# Patient Record
Sex: FEMALE | Race: WHITE | NOT HISPANIC OR LATINO | Employment: FULL TIME | URBAN - NONMETROPOLITAN AREA
[De-identification: names, ages, dates, MRNs, and addresses within clinical notes are randomized per-mention and may not be internally consistent; named-entity substitution may affect disease eponyms.]

---

## 2018-03-01 ENCOUNTER — HOSPITAL ENCOUNTER (EMERGENCY)
Facility: HOSPITAL | Age: 23
Discharge: HOME | End: 2018-03-02
Attending: STUDENT IN AN ORGANIZED HEALTH CARE EDUCATION/TRAINING PROGRAM | Admitting: STUDENT IN AN ORGANIZED HEALTH CARE EDUCATION/TRAINING PROGRAM

## 2018-03-01 ENCOUNTER — APPOINTMENT (OUTPATIENT)
Dept: ULTRASOUND IMAGING | Facility: HOSPITAL | Age: 23
End: 2018-03-01

## 2018-03-01 DIAGNOSIS — R10.11 RIGHT UPPER QUADRANT ABDOMINAL PAIN: Primary | ICD-10-CM

## 2018-03-01 PROCEDURE — 96374 THER/PROPH/DIAG INJ IV PUSH: CPT

## 2018-03-01 PROCEDURE — 99283 EMERGENCY DEPT VISIT LOW MDM: CPT

## 2018-03-01 PROCEDURE — 80053 COMPREHEN METABOLIC PANEL: CPT | Performed by: STUDENT IN AN ORGANIZED HEALTH CARE EDUCATION/TRAINING PROGRAM

## 2018-03-01 PROCEDURE — 25010000002 ONDANSETRON PER 1 MG: Performed by: STUDENT IN AN ORGANIZED HEALTH CARE EDUCATION/TRAINING PROGRAM

## 2018-03-01 PROCEDURE — 96361 HYDRATE IV INFUSION ADD-ON: CPT

## 2018-03-01 PROCEDURE — 25010000002 MORPHINE PER 10 MG: Performed by: STUDENT IN AN ORGANIZED HEALTH CARE EDUCATION/TRAINING PROGRAM

## 2018-03-01 PROCEDURE — 96375 TX/PRO/DX INJ NEW DRUG ADDON: CPT

## 2018-03-01 PROCEDURE — 85025 COMPLETE CBC W/AUTO DIFF WBC: CPT | Performed by: STUDENT IN AN ORGANIZED HEALTH CARE EDUCATION/TRAINING PROGRAM

## 2018-03-01 PROCEDURE — 76705 ECHO EXAM OF ABDOMEN: CPT

## 2018-03-01 RX ORDER — MORPHINE SULFATE 4 MG/ML
4 INJECTION, SOLUTION INTRAMUSCULAR; INTRAVENOUS ONCE
Status: COMPLETED | OUTPATIENT
Start: 2018-03-01 | End: 2018-03-01

## 2018-03-01 RX ORDER — ONDANSETRON 2 MG/ML
4 INJECTION INTRAMUSCULAR; INTRAVENOUS ONCE
Status: COMPLETED | OUTPATIENT
Start: 2018-03-01 | End: 2018-03-01

## 2018-03-01 RX ADMIN — SODIUM CHLORIDE 1000 ML: 9 INJECTION, SOLUTION INTRAVENOUS at 23:55

## 2018-03-01 RX ADMIN — ONDANSETRON HYDROCHLORIDE 4 MG: 2 INJECTION, SOLUTION INTRAMUSCULAR; INTRAVENOUS at 23:54

## 2018-03-01 RX ADMIN — MORPHINE SULFATE 4 MG: 4 INJECTION, SOLUTION INTRAMUSCULAR; INTRAVENOUS at 23:55

## 2018-03-02 VITALS
HEIGHT: 66 IN | TEMPERATURE: 98.2 F | RESPIRATION RATE: 16 BRPM | BODY MASS INDEX: 28.93 KG/M2 | SYSTOLIC BLOOD PRESSURE: 147 MMHG | OXYGEN SATURATION: 100 % | HEART RATE: 85 BPM | DIASTOLIC BLOOD PRESSURE: 88 MMHG | WEIGHT: 180 LBS

## 2018-03-02 LAB
ALBUMIN SERPL-MCNC: 4.3 G/DL (ref 3.5–5)
ALBUMIN/GLOB SERPL: 1.4 G/DL (ref 1–2)
ALP SERPL-CCNC: 51 U/L (ref 38–126)
ALT SERPL W P-5'-P-CCNC: 28 U/L (ref 13–69)
ANION GAP SERPL CALCULATED.3IONS-SCNC: 17 MMOL/L
AST SERPL-CCNC: 18 U/L (ref 15–46)
B-HCG UR QL: NEGATIVE
BACTERIA UR QL AUTO: ABNORMAL /HPF
BASOPHILS # BLD AUTO: 0.02 10*3/MM3 (ref 0–0.2)
BASOPHILS NFR BLD AUTO: 0.3 % (ref 0–2.5)
BILIRUB SERPL-MCNC: 0.2 MG/DL (ref 0.2–1.3)
BILIRUB UR QL STRIP: NEGATIVE
BUN BLD-MCNC: 11 MG/DL (ref 7–20)
BUN/CREAT SERPL: 13.8 (ref 7.1–23.5)
CALCIUM SPEC-SCNC: 9.4 MG/DL (ref 8.4–10.2)
CHLORIDE SERPL-SCNC: 106 MMOL/L (ref 98–107)
CLARITY UR: CLEAR
CO2 SERPL-SCNC: 24 MMOL/L (ref 26–30)
COLOR UR: YELLOW
CREAT BLD-MCNC: 0.8 MG/DL (ref 0.6–1.3)
DEPRECATED RDW RBC AUTO: 44 FL (ref 37–54)
EOSINOPHIL # BLD AUTO: 0.14 10*3/MM3 (ref 0–0.7)
EOSINOPHIL NFR BLD AUTO: 2 % (ref 0–7)
ERYTHROCYTE [DISTWIDTH] IN BLOOD BY AUTOMATED COUNT: 13 % (ref 11.5–14.5)
GFR SERPL CREATININE-BSD FRML MDRD: 91 ML/MIN/1.73
GLOBULIN UR ELPH-MCNC: 3.1 GM/DL
GLUCOSE BLD-MCNC: 109 MG/DL (ref 74–98)
GLUCOSE UR STRIP-MCNC: NEGATIVE MG/DL
HCT VFR BLD AUTO: 37.6 % (ref 37–47)
HGB BLD-MCNC: 12.7 G/DL (ref 12–16)
HGB UR QL STRIP.AUTO: ABNORMAL
HYALINE CASTS UR QL AUTO: ABNORMAL /LPF
IMM GRANULOCYTES # BLD: 0.01 10*3/MM3 (ref 0–0.06)
IMM GRANULOCYTES NFR BLD: 0.1 % (ref 0–0.6)
KETONES UR QL STRIP: NEGATIVE
LEUKOCYTE ESTERASE UR QL STRIP.AUTO: NEGATIVE
LYMPHOCYTES # BLD AUTO: 3.29 10*3/MM3 (ref 0.6–3.4)
LYMPHOCYTES NFR BLD AUTO: 48.1 % (ref 10–50)
MCH RBC QN AUTO: 31.4 PG (ref 27–31)
MCHC RBC AUTO-ENTMCNC: 33.8 G/DL (ref 30–37)
MCV RBC AUTO: 92.8 FL (ref 81–99)
MONOCYTES # BLD AUTO: 0.57 10*3/MM3 (ref 0–0.9)
MONOCYTES NFR BLD AUTO: 8.3 % (ref 0–12)
MUCOUS THREADS URNS QL MICRO: ABNORMAL /HPF
NEUTROPHILS # BLD AUTO: 2.81 10*3/MM3 (ref 2–6.9)
NEUTROPHILS NFR BLD AUTO: 41.2 % (ref 37–80)
NITRITE UR QL STRIP: NEGATIVE
NRBC BLD MANUAL-RTO: 0 /100 WBC (ref 0–0)
PH UR STRIP.AUTO: 6 [PH] (ref 5–8)
PLATELET # BLD AUTO: 234 10*3/MM3 (ref 130–400)
PMV BLD AUTO: 10.1 FL (ref 6–12)
POTASSIUM BLD-SCNC: 4 MMOL/L (ref 3.5–5.1)
PROT SERPL-MCNC: 7.4 G/DL (ref 6.3–8.2)
PROT UR QL STRIP: NEGATIVE
RBC # BLD AUTO: 4.05 10*6/MM3 (ref 4.2–5.4)
RBC # UR: ABNORMAL /HPF
REF LAB TEST METHOD: ABNORMAL
SODIUM BLD-SCNC: 143 MMOL/L (ref 137–145)
SP GR UR STRIP: 1.02 (ref 1–1.03)
SQUAMOUS #/AREA URNS HPF: ABNORMAL /HPF
UROBILINOGEN UR QL STRIP: ABNORMAL
WBC NRBC COR # BLD: 6.84 10*3/MM3 (ref 4.8–10.8)
WBC UR QL AUTO: ABNORMAL /HPF

## 2018-03-02 PROCEDURE — 81025 URINE PREGNANCY TEST: CPT | Performed by: STUDENT IN AN ORGANIZED HEALTH CARE EDUCATION/TRAINING PROGRAM

## 2018-03-02 PROCEDURE — 81001 URINALYSIS AUTO W/SCOPE: CPT | Performed by: STUDENT IN AN ORGANIZED HEALTH CARE EDUCATION/TRAINING PROGRAM

## 2018-03-02 RX ORDER — ONDANSETRON 4 MG/1
4 TABLET, ORALLY DISINTEGRATING ORAL EVERY 6 HOURS PRN
Qty: 20 TABLET | Refills: 0 | OUTPATIENT
Start: 2018-03-02 | End: 2018-12-06

## 2018-03-02 RX ORDER — HYDROCODONE BITARTRATE AND ACETAMINOPHEN 5; 325 MG/1; MG/1
1 TABLET ORAL EVERY 6 HOURS PRN
Qty: 10 TABLET | Refills: 0 | OUTPATIENT
Start: 2018-03-02 | End: 2018-12-06

## 2018-03-02 NOTE — DISCHARGE INSTRUCTIONS
Biliary Colic, Adult  Biliary colic is severe pain caused by a problem with a small organ in the upper right part of your belly (gallbladder). The gallbladder stores a digestive fluid produced in the liver (bile) that helps the body break down fat. Bile and other digestive enzymes are carried from the liver to the small intestine though tube-like structures (bile ducts). The gallbladder and the bile ducts form the biliary tract.  Sometimes hard deposits of digestive fluids form in the gallbladder (gallstones) and block the flow of bile from the gallbladder, causing biliary colic. This condition is also called a gallbladder attack. Gallstones can be as small as a grain of sand or as big as a golf ball. There could be just one gallstone in the gallbladder, or there could be many.  What are the causes?  Biliary colic is usually caused by gallstones. Less often, a tumor could block the flow of bile from the gallbladder and trigger biliary colic.  What increases the risk?  This condition is more likely to develop in:  · Women.  · People of  descent.  · People with a family history of gallstones.  · People who are obese.  · People who suddenly or quickly lose weight.  · People who eat a high-calorie, low-fiber diet that is rich in refined carbs (carbohydrates), such as white bread and white rice.  · People who have an intestinal disease that affects nutrient absorption, such as Crohn disease.  · People who have a metabolic condition, such as metabolic syndrome or diabetes.  What are the signs or symptoms?  Severe pain in the upper right side of the belly is the main symptom of biliary colic. You may feel this pain below the chest but above the hip. This pain often occurs at night or after eating a very fatty meal. This pain may get worse for up to an hour and last as long as 12 hours. In most cases, the pain fades (subsides) within a couple hours.  Other symptoms of this condition include:  · Nausea and  vomiting.  · Pain under the right shoulder.  How is this diagnosed?  This condition is diagnosed based on your medical history, your symptoms, and a physical exam. You may have tests, including:  · Blood tests to rule out infection or inflammation of the bile ducts, gallbladder, pancreas, or liver.  · Imaging studies such as:  ¨ Ultrasound.  ¨ CT scan.  ¨ MRI.  In some cases, you may need to have an imaging study done using a small amount of radioactive material (nuclear medicine) to confirm the diagnosis.  How is this treated?  Treatment for this condition may include medicine to relieve your pain or nausea. If you have gallstones that are causing biliary colic, you may need surgery to remove the gallbladder (cholecystectomy). Gallstones can also be dissolved gradually with medicine. It may take months or years before the gallstones are completely gone.  Follow these instructions at home:  · Take over-the-counter and prescription medicines only as told by your health care provider.  · Drink enough fluid to keep your urine clear or pale yellow.  · Follow instructions from your health care provider about eating or drinking restrictions. These may include avoiding:  ¨ Fatty, greasy, and fried foods.  ¨ Any foods that make the pain worse.  ¨ Overeating.  ¨ Having a large meal after not eating for a while.  · Keep all follow-up visits as told by your health care provider. This is important.  How is this prevented?  Steps to prevent this condition include:  · Maintaining a healthy body weight.  · Getting regular exercise.  · Eating a healthy, high-fiber, low-fat diet.  · Limiting how much sugar and refined carbs you eat, such as sweets, white flour, and white rice.  Contact a health care provider if:  · Your pain lasts more than 5 hours.  · You vomit.  · You have a fever and chills.  · Your pain gets worse.  Get help right away if:  · Your skin or the whites of your eyes look yellow (jaundice).  · Your have tea-colored  urine and light-colored stools.  · You are dizzy or you faint.  This information is not intended to replace advice given to you by your health care provider. Make sure you discuss any questions you have with your health care provider.  Document Released: 05/21/2007 Document Revised: 08/15/2017 Document Reviewed: 07/03/2017  [employer] Interactive Patient Education © 2017 [employer]

## 2018-03-02 NOTE — ED PROVIDER NOTES
Subjective   HPI Comments: 23 yo f who presents with RUQ abd pain has been intermittent for the past 5 days.  States is much worse after she eats.  Tonight was worse she has had.  She is concerned that maybe her gallbladder.  Sr. had have her gallbladder out a couple of years ago.  Her primary care provider as scheduled her for an ultrasound.  Symptoms come on she becomes nauseous.  Pain is stabbing pain that goes from right upper quadrant to her chest and right shoulder.      Review of Systems   All other systems reviewed and are negative.      Past Medical History:   Diagnosis Date   • Hydradenitis        No Known Allergies    History reviewed. No pertinent surgical history.    History reviewed. No pertinent family history.    Social History     Social History   • Marital status: Single     Social History Main Topics   • Smoking status: Never Smoker   • Smokeless tobacco: Never Used   • Alcohol use Yes   • Drug use: No   • Sexual activity: Yes           Objective   Physical Exam   Nursing note and vitals reviewed.    GEN: Appears uncomfortable, but nontoxic  Head: Normocephalic, atraumatic  Eyes: Pupils equal round reactive to light  ENT: Posterior pharynx normal in appearance, oral mucosa is moist  Chest: Nontender to palpation  Cardiovascular: Regular rate  Lungs: Clear to auscultation bilaterally  Abdomen: Soft, tender in right upper quadrant, nondistended, no peritoneal signs  Extremities: No edema, normal appearance  Neuro: GCS 15  Psych: Mood and affect are appropriate    Procedures         ED Course  ED Course                  MDM  Number of Diagnoses or Management Options  Right upper quadrant abdominal pain:   Diagnosis management comments: Different diagnosis for this patient would include cholecystitis, cholelithiasis, pancreatitis, gastritis, GERD, gastroparesis, acute coronary syndrome, or other concerns.  Treated with IV narcotics and antiemetics good symptom control  Ultrasound is  unremarkable  Patient will need a HIDA scan.  After speaking with the patient's parents they report that a HIDA scan was required before her sister could have her gallbladder removed.       Amount and/or Complexity of Data Reviewed  Clinical lab tests: ordered and reviewed  Decide to obtain previous medical records or to obtain history from someone other than the patient: yes  Obtain history from someone other than the patient: yes  Review and summarize past medical records: yes  Independent visualization of images, tracings, or specimens: yes        Final diagnoses:   Right upper quadrant abdominal pain            Jorge Sanchez MD  03/02/18 4298

## 2019-05-16 ENCOUNTER — LAB (OUTPATIENT)
Dept: LAB | Facility: HOSPITAL | Age: 24
End: 2019-05-16

## 2019-05-16 ENCOUNTER — TRANSCRIBE ORDERS (OUTPATIENT)
Dept: LAB | Facility: HOSPITAL | Age: 24
End: 2019-05-16

## 2019-05-16 DIAGNOSIS — Z79.899 PRESCRIPTION MEDICATION STARTED: Primary | ICD-10-CM

## 2019-05-16 DIAGNOSIS — Z79.899 PRESCRIPTION MEDICATION STARTED: ICD-10-CM

## 2019-05-16 LAB
ALBUMIN SERPL-MCNC: 4.4 G/DL (ref 3.5–5)
ALBUMIN/GLOB SERPL: 1.3 G/DL (ref 1–2)
ALP SERPL-CCNC: 60 U/L (ref 38–126)
ALT SERPL W P-5'-P-CCNC: 28 U/L (ref 13–69)
ANION GAP SERPL CALCULATED.3IONS-SCNC: 15.6 MMOL/L (ref 10–20)
AST SERPL-CCNC: 26 U/L (ref 15–46)
BASOPHILS # BLD AUTO: 0.02 10*3/MM3 (ref 0–0.2)
BASOPHILS NFR BLD AUTO: 0.3 % (ref 0–1.5)
BILIRUB CONJ SERPL-MCNC: 0 MG/DL (ref 0–0.4)
BILIRUB SERPL-MCNC: 0.3 MG/DL (ref 0.2–1.3)
BUN BLD-MCNC: 10 MG/DL (ref 7–20)
BUN/CREAT SERPL: 16.7 (ref 7.1–23.5)
CALCIUM SPEC-SCNC: 9 MG/DL (ref 8.4–10.2)
CHLORIDE SERPL-SCNC: 101 MMOL/L (ref 98–107)
CHOLEST SERPL-MCNC: 170 MG/DL (ref 0–199)
CO2 SERPL-SCNC: 25 MMOL/L (ref 26–30)
CREAT BLD-MCNC: 0.6 MG/DL (ref 0.6–1.3)
DEPRECATED RDW RBC AUTO: 42.4 FL (ref 37–54)
EOSINOPHIL # BLD AUTO: 0.2 10*3/MM3 (ref 0–0.4)
EOSINOPHIL NFR BLD AUTO: 3.1 % (ref 0.3–6.2)
ERYTHROCYTE [DISTWIDTH] IN BLOOD BY AUTOMATED COUNT: 12.8 % (ref 12.3–15.4)
GFR SERPL CREATININE-BSD FRML MDRD: 125 ML/MIN/1.73
GLOBULIN UR ELPH-MCNC: 3.5 GM/DL
GLUCOSE BLD-MCNC: 89 MG/DL (ref 74–98)
HCT VFR BLD AUTO: 39 % (ref 34–46.6)
HDLC SERPL-MCNC: 48 MG/DL (ref 40–60)
HGB BLD-MCNC: 13.3 G/DL (ref 12–15.9)
IMM GRANULOCYTES # BLD AUTO: 0.02 10*3/MM3 (ref 0–0.05)
IMM GRANULOCYTES NFR BLD AUTO: 0.3 % (ref 0–0.5)
LDLC SERPL CALC-MCNC: 91 MG/DL (ref 0–99)
LDLC/HDLC SERPL: 1.89 {RATIO}
LYMPHOCYTES # BLD AUTO: 2.23 10*3/MM3 (ref 0.7–3.1)
LYMPHOCYTES NFR BLD AUTO: 35 % (ref 19.6–45.3)
MCH RBC QN AUTO: 31.2 PG (ref 26.6–33)
MCHC RBC AUTO-ENTMCNC: 34.1 G/DL (ref 31.5–35.7)
MCV RBC AUTO: 91.5 FL (ref 79–97)
MONOCYTES # BLD AUTO: 0.4 10*3/MM3 (ref 0.1–0.9)
MONOCYTES NFR BLD AUTO: 6.3 % (ref 5–12)
NEUTROPHILS # BLD AUTO: 3.5 10*3/MM3 (ref 1.7–7)
NEUTROPHILS NFR BLD AUTO: 55 % (ref 42.7–76)
NRBC BLD AUTO-RTO: 0 /100 WBC (ref 0–0.2)
PLATELET # BLD AUTO: 276 10*3/MM3 (ref 140–450)
PMV BLD AUTO: 10.4 FL (ref 6–12)
POTASSIUM BLD-SCNC: 3.6 MMOL/L (ref 3.5–5.1)
PROT SERPL-MCNC: 7.9 G/DL (ref 6.3–8.2)
RBC # BLD AUTO: 4.26 10*6/MM3 (ref 3.77–5.28)
SODIUM BLD-SCNC: 138 MMOL/L (ref 137–145)
TRIGL SERPL-MCNC: 157 MG/DL
VLDLC SERPL-MCNC: 31.4 MG/DL
WBC NRBC COR # BLD: 6.37 10*3/MM3 (ref 3.4–10.8)

## 2019-05-16 PROCEDURE — 87340 HEPATITIS B SURFACE AG IA: CPT

## 2019-05-16 PROCEDURE — 36415 COLL VENOUS BLD VENIPUNCTURE: CPT

## 2019-05-16 PROCEDURE — 86480 TB TEST CELL IMMUN MEASURE: CPT

## 2019-05-16 PROCEDURE — 80074 ACUTE HEPATITIS PANEL: CPT

## 2019-05-16 PROCEDURE — 86706 HEP B SURFACE ANTIBODY: CPT

## 2019-05-16 PROCEDURE — 80061 LIPID PANEL: CPT

## 2019-05-16 PROCEDURE — 86705 HEP B CORE ANTIBODY IGM: CPT

## 2019-05-16 PROCEDURE — 80053 COMPREHEN METABOLIC PANEL: CPT

## 2019-05-16 PROCEDURE — 82248 BILIRUBIN DIRECT: CPT

## 2019-05-16 PROCEDURE — 85025 COMPLETE CBC W/AUTO DIFF WBC: CPT

## 2019-05-16 PROCEDURE — 86803 HEPATITIS C AB TEST: CPT

## 2019-05-17 LAB
HBV CORE IGM SERPL QL IA: NEGATIVE
HBV SURFACE AB SER QL: REACTIVE
HBV SURFACE AG SERPL QL IA: NEGATIVE

## 2019-05-20 LAB
QUANTIFERON CRITERIA: NORMAL
QUANTIFERON MITOGEN VALUE: >10 IU/ML
QUANTIFERON NIL VALUE: 0.03 IU/ML
QUANTIFERON TB1 AG VALUE: 0.03 IU/ML
QUANTIFERON TB2 AG VALUE: 0.02 IU/ML
QUANTIFERON-TB GOLD PLUS: NEGATIVE

## 2019-05-22 LAB
HCV AB S/CO SERPL IA: <0.1 S/CO RATIO (ref 0–0.9)
HCV AB S/CO SERPL IA: <0.1 S/CO RATIO (ref 0–0.9)
SPECIMEN STATUS: NORMAL
SPECIMEN STATUS: NORMAL

## 2019-12-03 ENCOUNTER — TELEPHONE (OUTPATIENT)
Dept: URGENT CARE | Facility: CLINIC | Age: 24
End: 2019-12-03

## 2020-05-27 ENCOUNTER — LAB REQUISITION (OUTPATIENT)
Dept: LAB | Facility: HOSPITAL | Age: 25
End: 2020-05-27

## 2020-05-27 DIAGNOSIS — Z11.59 ENCOUNTER FOR SCREENING FOR OTHER VIRAL DISEASES: ICD-10-CM

## 2020-05-27 PROCEDURE — U0004 COV-19 TEST NON-CDC HGH THRU: HCPCS | Performed by: INTERNAL MEDICINE

## 2020-05-27 PROCEDURE — U0002 COVID-19 LAB TEST NON-CDC: HCPCS | Performed by: INTERNAL MEDICINE

## 2020-05-28 LAB
REF LAB TEST METHOD: NORMAL
SARS-COV-2 RNA RESP QL NAA+PROBE: NOT DETECTED

## 2021-08-13 ENCOUNTER — APPOINTMENT (OUTPATIENT)
Dept: GENERAL RADIOLOGY | Facility: HOSPITAL | Age: 26
End: 2021-08-13

## 2021-08-13 ENCOUNTER — HOSPITAL ENCOUNTER (EMERGENCY)
Facility: HOSPITAL | Age: 26
Discharge: HOME | End: 2021-08-13
Attending: EMERGENCY MEDICINE | Admitting: EMERGENCY MEDICINE

## 2021-08-13 VITALS
WEIGHT: 188.8 LBS | HEIGHT: 66 IN | RESPIRATION RATE: 16 BRPM | DIASTOLIC BLOOD PRESSURE: 78 MMHG | HEART RATE: 90 BPM | TEMPERATURE: 98.2 F | BODY MASS INDEX: 30.34 KG/M2 | SYSTOLIC BLOOD PRESSURE: 134 MMHG | OXYGEN SATURATION: 99 %
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
## 2021-08-13 DIAGNOSIS — S46.812A STRAIN OF LEFT TRAPEZIUS MUSCLE, INITIAL ENCOUNTER: Primary | ICD-10-CM

## 2021-08-13 LAB — B-HCG UR QL: NEGATIVE

## 2021-08-13 PROCEDURE — 81025 URINE PREGNANCY TEST: CPT | Performed by: EMERGENCY MEDICINE

## 2021-08-13 PROCEDURE — 73030 X-RAY EXAM OF SHOULDER: CPT

## 2021-08-13 PROCEDURE — 96372 THER/PROPH/DIAG INJ SC/IM: CPT

## 2021-08-13 PROCEDURE — 25010000002 KETOROLAC TROMETHAMINE PER 15 MG: Performed by: EMERGENCY MEDICINE

## 2021-08-13 PROCEDURE — 72040 X-RAY EXAM NECK SPINE 2-3 VW: CPT

## 2021-08-13 PROCEDURE — 99283 EMERGENCY DEPT VISIT LOW MDM: CPT

## 2021-08-13 PROCEDURE — 25010000002 ORPHENADRINE CITRATE PER 60 MG: Performed by: EMERGENCY MEDICINE

## 2021-08-13 RX ORDER — KETOROLAC TROMETHAMINE 30 MG/ML
60 INJECTION, SOLUTION INTRAMUSCULAR; INTRAVENOUS ONCE
Status: COMPLETED | OUTPATIENT
Start: 2021-08-13 | End: 2021-08-13

## 2021-08-13 RX ORDER — CYCLOBENZAPRINE HCL 10 MG
10 TABLET ORAL 3 TIMES DAILY PRN
Qty: 15 TABLET | Refills: 0 | Status: SHIPPED | OUTPATIENT
Start: 2021-08-13 | End: 2022-03-23

## 2021-08-13 RX ORDER — LIDOCAINE 50 MG/G
1 PATCH TOPICAL ONCE
Status: DISCONTINUED | OUTPATIENT
Start: 2021-08-13 | End: 2021-08-14 | Stop reason: HOSPADM

## 2021-08-13 RX ORDER — ORPHENADRINE CITRATE 30 MG/ML
60 INJECTION INTRAMUSCULAR; INTRAVENOUS EVERY 12 HOURS
Status: DISCONTINUED | OUTPATIENT
Start: 2021-08-13 | End: 2021-08-14 | Stop reason: HOSPADM

## 2021-08-13 RX ORDER — LIDOCAINE 50 MG/G
1 PATCH TOPICAL EVERY 24 HOURS
Qty: 6 EACH | Refills: 0 | Status: SHIPPED | OUTPATIENT
Start: 2021-08-13 | End: 2022-03-23

## 2021-08-13 RX ADMIN — KETOROLAC TROMETHAMINE 60 MG: 30 INJECTION, SOLUTION INTRAMUSCULAR at 21:01

## 2021-08-13 RX ADMIN — ORPHENADRINE CITRATE 60 MG: 30 INJECTION INTRAMUSCULAR; INTRAVENOUS at 21:01

## 2021-08-13 RX ADMIN — LIDOCAINE 1 PATCH: 50 PATCH TOPICAL at 21:01
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
## 2022-01-18 PROCEDURE — U0004 COV-19 TEST NON-CDC HGH THRU: HCPCS | Performed by: NURSE PRACTITIONER

## 2022-02-23 ENCOUNTER — OFFICE VISIT (OUTPATIENT)
Dept: INTERNAL MEDICINE | Facility: CLINIC | Age: 27
End: 2022-02-23

## 2022-02-23 VITALS
OXYGEN SATURATION: 98 % | HEART RATE: 95 BPM | BODY MASS INDEX: 30.61 KG/M2 | TEMPERATURE: 97.7 F | HEIGHT: 67 IN | DIASTOLIC BLOOD PRESSURE: 82 MMHG | WEIGHT: 195 LBS | SYSTOLIC BLOOD PRESSURE: 128 MMHG

## 2022-02-23 DIAGNOSIS — I10 PRIMARY HYPERTENSION: ICD-10-CM

## 2022-02-23 DIAGNOSIS — Z23 NEED FOR COVID-19 VACCINE: ICD-10-CM

## 2022-02-23 DIAGNOSIS — L73.2 HIDRADENITIS SUPPURATIVA: ICD-10-CM

## 2022-02-23 DIAGNOSIS — K21.9 HIATAL HERNIA WITH GERD: ICD-10-CM

## 2022-02-23 DIAGNOSIS — K44.9 HIATAL HERNIA WITH GERD: ICD-10-CM

## 2022-02-23 DIAGNOSIS — Z76.89 ENCOUNTER TO ESTABLISH CARE: Primary | ICD-10-CM

## 2022-02-23 PROCEDURE — 99214 OFFICE O/P EST MOD 30 MIN: CPT | Performed by: NURSE PRACTITIONER

## 2022-02-23 PROCEDURE — 0054A COVID-19 (PFIZER) 12+ YRS: CPT | Performed by: NURSE PRACTITIONER

## 2022-02-23 PROCEDURE — 91305 COVID-19 (PFIZER) 12+ YRS: CPT | Performed by: NURSE PRACTITIONER

## 2022-02-23 RX ORDER — DEXLANSOPRAZOLE 60 MG/1
60 CAPSULE, DELAYED RELEASE ORAL DAILY
Qty: 90 CAPSULE | Refills: 3 | Status: SHIPPED | OUTPATIENT
Start: 2022-02-23 | End: 2022-07-21

## 2022-02-23 NOTE — PROGRESS NOTES
Office Visit      Patient Name: Bertha Monte  : 1995   MRN: [id number]   Care Team: Patient Care Team:  Halina Quezada APRN as PCP - General (Family Medicine)    Chief Complaint  Establish Care (medication refills )    Subjective     Subjective      Bertha Monte presents to [facility] to establish care and for medication refill.   Transferring care from [place], has been living in [place] for a while but has been driving back and forth to PCP and is inconvenient.   She suffers from hidradenitis suppurativa and follows with Dr. Washington for management. She has been on humira since she was 19 years-old for the problem and it is well managed. She just had her labs checked at her dermatology visit in January and states to her knowledge they were normal.   She has also been diagnosed with hypertension. First diagnosed several years ago but has since lost weight, follows the DASH diet, and exercises throughout the week. Blood pressure has been well managed with lifestyle changes and spironolactone. She is interested in stopping her spironolactone as she plans to start trying to conceive soon and has heard she can't take it. She is good about monitoring her blood pressure at home and states It averages < 130/80. Denies shortness of breath, chest pain, lower extremity swelling, orthopnea, dizziness, and fatigue. She states she thinks her potassium was checked in January.   She has had an EGD and diagnosed with GERD and small hiatal hernia. She has tried several different PPI's but the only one that gives her relief is dexilant. Her symptoms are well controlled on dexilant and she has no side effects from the medication. She tries to follow dietary changes recommended to her for GERD. Denies ETOH, tobacco, blood in stool, abdominal pain, nausea, and vomiting. She does not need any OTC medications for breakthrough symptoms.   She would like her COVID booster dose today.  "    Review of Systems   Constitutional: Negative for appetite change, fatigue and fever.   HENT: Negative for congestion, sore throat and trouble swallowing.    Eyes: Negative for blurred vision and visual disturbance.   Respiratory: Negative for cough, shortness of breath and wheezing.    Cardiovascular: Negative for chest pain, palpitations and leg swelling.   Gastrointestinal: Negative for abdominal pain, blood in stool, constipation, diarrhea and nausea.   Endocrine: Negative for polydipsia, polyphagia and polyuria.   Genitourinary: Negative for dysuria.   Musculoskeletal: Negative for arthralgias, back pain and myalgias.   Skin: Negative for rash.   Neurological: Negative for dizziness, weakness, light-headedness and headache.   Psychiatric/Behavioral: Negative for sleep disturbance and depressed mood. The patient is not nervous/anxious.        Objective     Objective   Vital Signs:   /82   Pulse 95   Temp 97.7 °F (36.5 °C) (Temporal)   Ht 170.2 cm (67\")   Wt 88.5 kg (195 lb)   SpO2 98%   BMI 30.54 kg/m²     Physical Exam  Vitals and nursing note reviewed.   Constitutional:       General: She is not in acute distress.     Appearance: Normal appearance. She is not toxic-appearing.   Eyes:      Pupils: Pupils are equal, round, and reactive to light.   Neck:      Vascular: No carotid bruit.   Cardiovascular:      Rate and Rhythm: Normal rate and regular rhythm.      Heart sounds: Normal heart sounds. No murmur heard.      Pulmonary:      Effort: Pulmonary effort is normal. No respiratory distress.      Breath sounds: Normal breath sounds. No wheezing.   Abdominal:      General: Bowel sounds are normal. There is no distension.      Palpations: Abdomen is soft.      Tenderness: There is no abdominal tenderness.   Musculoskeletal:         General: Normal range of motion.      Cervical back: Neck supple. No tenderness.   Skin:     General: Skin is warm and dry.      Findings: No rash.   Neurological:      " General: No focal deficit present.      Mental Status: She is alert.   Psychiatric:         Mood and Affect: Mood normal.         Behavior: Behavior normal.          Assessment / Plan      Assessment/Plan   Problem List Items Addressed This Visit        Cardiac and Vasculature    Primary hypertension    Stable. Do recommend stopping spironolactone as this is not indicated in pregnancy. Recommend home blood pressure monitoring over the next month and bring readings to next visit. If staying elevated will need to consider other antihypertensive modality. Recommend DASH diet, moderate-intensity exercise 4-5 times/week, and stress management. Follow-up in 4 weeks, will perform physical and labs at that time.        Gastrointestinal Abdominal     Hiatal hernia with GERD    Relevant Medications    dexlansoprazole (DEXILANT) 60 MG capsule    Avoid eating spicy foods, caffeinated and carbonated beverages, alcohol, and chocolate. Try not to eat or drink within 3 hours of going to bed at night. May elevate the head of the bed. Eat smaller portions. Adhere to medication regimen as prescribed. Recommend trying every other day dosing of dexilant.        Skin    Hidradenitis suppurativa    Managed by dermatology, keep follow-up. Discussed implications during pregnancy, medication not recommended.       Other Visit Diagnoses     Encounter to establish care    -  Primary    Need for COVID-19 vaccine        Relevant Orders    COVID-19 Vaccine (Pfizer) Gray Cap (Completed)         Follow Up   Return in about 4 weeks (around 3/23/2022), or if symptoms worsen or fail to improve.  Patient was given instructions and counseling regarding her condition or for health maintenance advice. Please see specific information pulled into the AVS if appropriate.     VANCE Kaye  [facility]

## 2022-03-23 ENCOUNTER — OFFICE VISIT (OUTPATIENT)
Dept: INTERNAL MEDICINE | Facility: CLINIC | Age: 27
End: 2022-03-23

## 2022-03-23 VITALS
HEIGHT: 67 IN | TEMPERATURE: 98 F | OXYGEN SATURATION: 99 % | HEART RATE: 83 BPM | BODY MASS INDEX: 30.98 KG/M2 | SYSTOLIC BLOOD PRESSURE: 122 MMHG | DIASTOLIC BLOOD PRESSURE: 78 MMHG | WEIGHT: 197.4 LBS

## 2022-03-23 DIAGNOSIS — E66.09 CLASS 1 OBESITY DUE TO EXCESS CALORIES WITHOUT SERIOUS COMORBIDITY WITH BODY MASS INDEX (BMI) OF 30.0 TO 30.9 IN ADULT: ICD-10-CM

## 2022-03-23 DIAGNOSIS — I10 PRIMARY HYPERTENSION: ICD-10-CM

## 2022-03-23 DIAGNOSIS — K44.9 HIATAL HERNIA WITH GERD: ICD-10-CM

## 2022-03-23 DIAGNOSIS — L73.2 HIDRADENITIS SUPPURATIVA: ICD-10-CM

## 2022-03-23 DIAGNOSIS — Z31.9 PATIENT DESIRES PREGNANCY: ICD-10-CM

## 2022-03-23 DIAGNOSIS — Z00.00 ENCOUNTER FOR ANNUAL PHYSICAL EXAM: Primary | ICD-10-CM

## 2022-03-23 DIAGNOSIS — K21.9 HIATAL HERNIA WITH GERD: ICD-10-CM

## 2022-03-23 DIAGNOSIS — E61.1 IRON DEFICIENCY: ICD-10-CM

## 2022-03-23 LAB
ALBUMIN SERPL-MCNC: 4.3 G/DL (ref 3.5–5.2)
ALBUMIN/GLOB SERPL: 1.5 G/DL
ALP SERPL-CCNC: 57 U/L (ref 39–117)
ALT SERPL-CCNC: 18 U/L (ref 1–33)
AST SERPL-CCNC: 13 U/L (ref 1–32)
BILIRUB SERPL-MCNC: 0.4 MG/DL (ref 0–1.2)
BUN SERPL-MCNC: 7 MG/DL (ref 6–20)
BUN/CREAT SERPL: 9.3 (ref 7–25)
CALCIUM SERPL-MCNC: 9.5 MG/DL (ref 8.6–10.5)
CHLORIDE SERPL-SCNC: 100 MMOL/L (ref 98–107)
CHOLEST SERPL-MCNC: 209 MG/DL (ref 0–200)
CO2 SERPL-SCNC: 23.1 MMOL/L (ref 22–29)
CREAT SERPL-MCNC: 0.75 MG/DL (ref 0.57–1)
EGFRCR SERPLBLD CKD-EPI 2021: 113.5 ML/MIN/1.73
ERYTHROCYTE [DISTWIDTH] IN BLOOD BY AUTOMATED COUNT: 12.9 % (ref 12.3–15.4)
FERRITIN SERPL-MCNC: 119 NG/ML (ref 13–150)
FOLATE SERPL-MCNC: >20 NG/ML (ref 4.78–24.2)
GLOBULIN SER CALC-MCNC: 2.9 GM/DL
GLUCOSE SERPL-MCNC: 83 MG/DL (ref 65–99)
HCT VFR BLD AUTO: 38.5 % (ref 34–46.6)
HDLC SERPL-MCNC: 51 MG/DL (ref 40–60)
HGB BLD-MCNC: 13.1 G/DL (ref 12–15.9)
IRON SATN MFR SERPL: 21 % (ref 20–50)
IRON SERPL-MCNC: 93 MCG/DL (ref 37–145)
LDLC SERPL CALC-MCNC: 134 MG/DL (ref 0–100)
MCH RBC QN AUTO: 30.3 PG (ref 26.6–33)
MCHC RBC AUTO-ENTMCNC: 34 G/DL (ref 31.5–35.7)
MCV RBC AUTO: 88.9 FL (ref 79–97)
PLATELET # BLD AUTO: 305 10*3/MM3 (ref 140–450)
POTASSIUM SERPL-SCNC: 4 MMOL/L (ref 3.5–5.2)
PROT SERPL-MCNC: 7.2 G/DL (ref 6–8.5)
RBC # BLD AUTO: 4.33 10*6/MM3 (ref 3.77–5.28)
SODIUM SERPL-SCNC: 136 MMOL/L (ref 136–145)
TIBC SERPL-MCNC: 434 MCG/DL
TRIGL SERPL-MCNC: 137 MG/DL (ref 0–150)
UIBC SERPL-MCNC: 341 MCG/DL (ref 112–346)
VLDLC SERPL CALC-MCNC: 24 MG/DL (ref 5–40)
WBC # BLD AUTO: 8.03 10*3/MM3 (ref 3.4–10.8)

## 2022-03-23 PROCEDURE — 99395 PREV VISIT EST AGE 18-39: CPT | Performed by: NURSE PRACTITIONER

## 2022-03-23 NOTE — PROGRESS NOTES
Geraldo Monte is a 26 y.o. female and is here for a comprehensive physical exam. The patient reports no problems.    HPI: here today for annual physical exam without acute complaints today.   Stopped spironolactone after last visit due to trying to conceive. Her blood pressure at home is averaging 120/80. Denies headaches, chest pain, dizziness, shortness of breath, and lower extremity swelling.   Using dexilant every other day now and reflux symptoms feel well controlled.   She declines tdap and HPV today.     Health Habits:  Eye exam within last 2 years? Yes.   Dental exam every 6 months? Yes.   Exercise habits: [place] walks [place] 3 days a week normally.   Healthy diet? Tries to eat pretty healthy. Likes vegetables, drinks mostly water with some coffee, and tries to avoid fast food.     The ASCVD Risk score (Alireza LEVIN Jr., et al., 2013) failed to calculate for the following reasons:    The 2013 ASCVD risk score is only valid for ages 40 to 79    Do you take any herbs or supplements that were not prescribed by a doctor? yes- prenatal vitamin.   Are you taking calcium supplements? No  Are you taking aspirin daily? No     History:  LMP: Patient's last menstrual period was 02/23/2022.  Menopause: No  Last pap date: March 2021 - was normal.   Abnormal pap? yes- repeat normal.   Family history of breast or ovarian cancer: no       OB History   No obstetric history on file.      reports being sexually active and has had partner(s) who are male. She reports using the following method of birth control/protection: None.    The following portions of the patient's history were reviewed and updated as appropriate: She  has a past medical history of Allergic (15 years), GERD (gastroesophageal reflux disease), History of medical problems (10), Hydradenitis, Hypertension, and Seasonal allergies.  She does not have any pertinent problems on file.  She  has a past surgical history that includes  Adenoidectomy (2006); Tonsillectomy (2006); Knee surgery (Left); and Otoplasty (Bilateral, 2001).  Her family history includes Hypertension in her father; No Known Problems in her mother.  She  reports that she has never smoked. She has never used smokeless tobacco. She reports that she does not drink alcohol and does not use drugs.  Current Outpatient Medications   Medication Sig Dispense Refill   • adalimumab (HUMIRA) 40 MG/0.8ML Prefilled Syringe Kit injection Inject 40 mg under the skin 1 (One) Time.     • cetirizine (zyrTEC) 10 MG tablet Take 10 mg by mouth Daily.     • cyclobenzaprine (FLEXERIL) 10 MG tablet Take 1 tablet by mouth 3 (Three) Times a Day As Needed for Muscle Spasms. 15 tablet 0   • dexlansoprazole (DEXILANT) 60 MG capsule Take 1 capsule by mouth Daily. 90 capsule 3   • fluticasone (FLONASE) 50 MCG/ACT nasal spray 2 sprays into the nostril(s) as directed by provider Daily. 2 puffs each nostril 1 bottle 0   • lidocaine (LIDODERM) 5 % Place 1 patch on the skin as directed by provider Daily. Remove & Discard patch within 12 hours or as directed by MD 6 each 0   • polyethylene glycol (MIRALAX) powder 17 g po daily prn constipaton 500 g 2     No current facility-administered medications for this visit.       Review of SystemsReview of Systems   Constitutional: Negative for appetite change, fatigue and fever.   HENT: Negative for congestion, sore throat and trouble swallowing.    Eyes: Negative for blurred vision and visual disturbance.   Respiratory: Negative for cough, shortness of breath and wheezing.    Cardiovascular: Negative for chest pain, palpitations and leg swelling.   Gastrointestinal: Negative for abdominal pain, blood in stool, constipation, diarrhea and nausea.   Endocrine: Negative for polydipsia, polyphagia and polyuria.   Genitourinary: Negative for dysuria.   Musculoskeletal: Negative for arthralgias, back pain and myalgias.   Skin: Negative for rash.   Neurological: Negative for  "dizziness, weakness, light-headedness and headache.   Psychiatric/Behavioral: Negative for sleep disturbance and depressed mood. The patient is not nervous/anxious.          Objective   /78   Pulse 83   Temp 98 °F (36.7 °C) (Temporal)   Ht 170.2 cm (67\")   Wt 89.5 kg (197 lb 6.4 oz)   LMP 02/23/2022   SpO2 99%   BMI 30.92 kg/m²     Physical Exam  Vitals and nursing note reviewed.   Constitutional:       General: She is not in acute distress.     Appearance: Normal appearance. She is obese.   HENT:      Right Ear: Tympanic membrane and ear canal normal.      Left Ear: Tympanic membrane and ear canal normal.      Nose: Nose normal.      Mouth/Throat:      Mouth: Mucous membranes are moist.      Pharynx: Oropharynx is clear. No posterior oropharyngeal erythema.   Eyes:      Extraocular Movements: Extraocular movements intact.      Pupils: Pupils are equal, round, and reactive to light.   Neck:      Thyroid: No thyroid mass or thyromegaly.      Vascular: No carotid bruit.   Cardiovascular:      Rate and Rhythm: Normal rate and regular rhythm.      Pulses: Normal pulses.      Heart sounds: Normal heart sounds. No murmur heard.  Pulmonary:      Effort: Pulmonary effort is normal.      Breath sounds: Normal breath sounds. No wheezing.   Abdominal:      General: Bowel sounds are normal. There is no distension.      Palpations: Abdomen is soft. There is no mass.      Tenderness: There is no abdominal tenderness.   Genitourinary:     Comments: Deferred per pt, [facility]BY[facility] managing womens care    Musculoskeletal:         General: No deformity. Normal range of motion.      Cervical back: Normal range of motion and neck supple. No muscular tenderness.   Lymphadenopathy:      Head:      Right side of head: No submandibular, tonsillar, preauricular or posterior auricular adenopathy.      Left side of head: No submandibular, tonsillar, preauricular or posterior auricular adenopathy.      Cervical: No cervical adenopathy. "   Skin:     General: Skin is warm and dry.      Capillary Refill: Capillary refill takes less than 2 seconds.      Findings: No bruising or rash.   Neurological:      General: No focal deficit present.      Mental Status: She is alert and oriented to person, place, and time.      Gait: Gait normal.      Deep Tendon Reflexes: Reflexes normal.   Psychiatric:         Mood and Affect: Mood normal.         Behavior: Behavior normal.            Assessment/Plan   Healthy female exam.     1.    Diagnosis Plan   1. Encounter for annual physical exam  CBC No Differential    Comprehensive metabolic panel    Folate    Lipid panel    Iron and TIBC    Ferritin    Health maintenance discussed during visit and information provided in AVS. Update labs today, continue prenatal vitamin, keep follow-up with OBGYN, avoid drugs and alcohol while trying to conceive, healthy diet, and exercise encouraged.    2. Primary hypertension  CBC No Differential    Comprehensive metabolic panel    Folate    Lipid panel    Iron and TIBC    Ferritin    Stable off of medication. Recommend DASH diet, moderate-intensity exercise 4-5 times/week, and stress management.    3. Hiatal hernia with GERD  CBC No Differential    Comprehensive metabolic panel    Folate    Lipid panel    Iron and TIBC    Ferritin    Stable with every other day dosing of PPI. Avoid eating spicy foods, caffeinated and carbonated beverages, alcohol, and chocolate. Try not to eat or drink within 3 hours of going to bed at night. May elevate the head of the bed. Eat smaller portions. Adhere to medication regimen as prescribed. RTC if symptoms worsen despite interventions.        4. Hidradenitis suppurativa  CBC No Differential    Comprehensive metabolic panel    Folate    Lipid panel    Iron and TIBC    Ferritin    Managed by dermatology, keep follow-up.    5. Patient desires pregnancy  CBC No Differential    Comprehensive metabolic panel    Folate    Lipid panel    Iron and TIBC     Ferritin    Keep follow-up with OBGYN, continue prenatal vitamin with folic acid, update labs as above today. Avoid OTC medications and illicit substances to a fetus. Healthy diet, exercise, and timed intercourse recommended.    6. Class 1 obesity due to excess calories without serious comorbidity with body mass index (BMI) of 30.0 to 30.9 in adult  CBC No Differential    Comprehensive metabolic panel    Folate    Lipid panel    Iron and TIBC    Ferritin    Update lipid panel. Increase lean meats, vegetables, fruit, healthy fats, and water intake. Decrease processed foods, sugar, carbohydrates, soda, and fast food. Educated on portion control and recommendations of moderate intensity exercise 4-5 times/week. This problem will be re-evaluated at next regular visit.      7. Iron deficiency  Iron and TIBC    Ferritin    Update labs and replace if indicated.          2. Patient Counseling:  --Nutrition: Stressed importance of moderation in sodium/caffeine intake, saturated fat and cholesterol, caloric balance, sufficient intake of fresh fruits, vegetables, fiber, calcium, iron, and 1 g folate supplementation if of childbearing age.   --Discussed the issue of calcium supplement, and the daily use of baby aspirin if applicable.             --Mammogram recommended every 2 years from age 40-49 and yearly beginning at age 50.  --Exercise: Stressed the importance of regular exercise.   --Substance Abuse: Discussed cessation/primary prevention of tobacco (if applicable), alcohol, or other drug use (if applicable); driving or other dangerous activities under the influence; availability of treatment for abuse.   --Injury prevention: Discussed safety belts, safety helmets, smoke detector, smoking near bedding or upholstery.   --Dental health: Discussed importance of regular tooth brushing, flossing, and dental visits every 6 months.  --Immunizations reviewed.  --Discussed benefits of screening colonoscopy (if  applicable).  --After hours service discussed with patient    3. Discussed the patient's BMI with her.  The BMI is above average; BMI management plan is completed  4. Return in about 1 year (around 3/23/2023) for Annual.     Halina Quezada, VANCE  03/23/2022  13:12 EDT

## 2022-05-03 ENCOUNTER — TELEPHONE (OUTPATIENT)
Dept: INTERNAL MEDICINE | Facility: CLINIC | Age: 27
End: 2022-05-03

## 2022-05-03 NOTE — TELEPHONE ENCOUNTER
Caller: Bertha Monte    Relationship to patient: Self    Best call back number: [phone]    What is the call regarding:  PATIENT STATES THAT SHE NEEDS A PRIOR AUTHORIZATION FOR HER DEXILANT.  THE PHARMACY SENT OVER A REQUEST.

## 2022-05-03 NOTE — TELEPHONE ENCOUNTER
PA REQUIREMENT  Dexilant 60MG dr capsules Required  Famotidine 40 Mg Tab Not Required  Nexium Packet 10 Mg Grps Not Required  Nexium Packet 2.5 Mg Grps Not Required  Nexium Packet 20 Mg Grps Not Required  Nexium Packet 40 Mg Grps Not Required  Nexium Packet 5 Mg Grps Not Required  Omeprazole 20 Mg Cpdr Not Required  Pantoprazole 20 Mg TbEC Not Required  Dexlansoprazole 60 Mg CpDB Required  Nexium 20 Mg Cpdr Required  Prevacid SoluTab 30 Mg TbLD Required  Rabeprazole (Tbec) Required  Zegerid 20-1,680 Mg Pack Required  Zegerid 40-1,680 Mg Pack Required    Do you want to Pa or change?

## 2022-06-30 ENCOUNTER — TELEPHONE (OUTPATIENT)
Dept: INTERNAL MEDICINE | Facility: CLINIC | Age: 27
End: 2022-06-30

## 2022-06-30 NOTE — TELEPHONE ENCOUNTER
Caller: Bertha Monte    Relationship: Self    Best call back number: [phone] (H)    What test/procedure requested: VEXILANT MEDICATION 60 MG,     When is it needed: AS SOON AS POSSIBLE    Additional information or concerns: PATIENT HAS 6 DAYS LEFT OF THIS MEDICATION, PHARMACY STATES PATIENT NEEDS PRIOR AUTHORIZATION OF VEXILANT MEDICATION 60 MG REFILL.    PHARMACY: [id number] [place] [address] [address] AT [facility] - [phone]  - [phone]  [phone]    PLEASE CALL PATIENT ONCE MEDICATION IS SENT TO PHARMACY.

## 2022-07-01 NOTE — TELEPHONE ENCOUNTER
Per covermymeds PA is not needed. Called and spoke with pharmacy they are going to call patient to verify insurance as we did not have the correct one on file here. Will notify us if anything needs to be done on our end

## 2022-07-08 NOTE — TELEPHONE ENCOUNTER
Caller: SHIRLEY     Relationship: [facility]     Best call back number:[phone]    What was the call regarding:   SHIRLEY WITH [facility] STATED THAT PATIENT'S MEDICATION    VEXILANT MEDICATION 60 MG,  NEEDS A PRIOR AUTHORIZATION FOR INSURANCE TO COVER     GUAN: [id number]  FAX: [phone]    Do you require a callback: YES

## 2022-07-08 NOTE — TELEPHONE ENCOUNTER
This writer has submitted a prior authorization on patient's [employer] as well as patients' spouse's insurance.  Both stated this medication does not require a PA.  Called patient and advised her to contact her insurance company for further instructions.

## 2022-07-13 ENCOUNTER — TELEPHONE (OUTPATIENT)
Dept: INTERNAL MEDICINE | Facility: CLINIC | Age: 27
End: 2022-07-13

## 2022-07-13 ENCOUNTER — PRIOR AUTHORIZATION (OUTPATIENT)
Dept: INTERNAL MEDICINE | Facility: CLINIC | Age: 27
End: 2022-07-13

## 2022-07-13 NOTE — TELEPHONE ENCOUNTER
PATIENT WAS DISCONNECTED. CALLED BACK AND STATED [facility] IS SAYING THEY ARE NOT GETTING THE PRIOR AUTHORIZATION FOR THIS MEDICATION Dexilant .  CAN CALL [phone]    PATIENT WOULD LIKE A CALL BACK  [phone]

## 2022-07-19 ENCOUNTER — TELEPHONE (OUTPATIENT)
Dept: INTERNAL MEDICINE | Facility: CLINIC | Age: 27
End: 2022-07-19

## 2022-07-19 NOTE — TELEPHONE ENCOUNTER
Pharmacy Name:  [facility]    Pharmacy representative name: LAURA    Pharmacy representative phone number: [phone]    What medication are you calling in regards to: DEXLENSOPRAZOLE 60 MG DELAYED RELEASE    What question does the pharmacy have: THE MEDICATION NEEDS A PRIOR AUTHORIZATION AND IT CAN BE DONE OVER THE PHONE BY CALLING THE NUMBER ABOVE.    Who is the provider that prescribed the medication: BRENDA

## 2022-07-20 NOTE — TELEPHONE ENCOUNTER
[facility] states patient must try two of the following- Esomprazole , Omeprazole , Lansoprazole , Pantoprazole . Before covering Dexilant

## 2022-07-21 RX ORDER — LANSOPRAZOLE 30 MG/1
30 CAPSULE, DELAYED RELEASE ORAL DAILY
Qty: 30 CAPSULE | Refills: 3 | Status: SHIPPED | OUTPATIENT
Start: 2022-07-21 | End: 2022-11-29

## 2022-07-21 NOTE — TELEPHONE ENCOUNTER
Please call the patient and let her know. I will send in lansoprazole for her to try. Let me know if symptoms worsen. This is due to insurance problems.

## 2022-08-12 ENCOUNTER — TRANSCRIBE ORDERS (OUTPATIENT)
Dept: LAB | Facility: HOSPITAL | Age: 27
End: 2022-08-12

## 2022-08-12 ENCOUNTER — LAB (OUTPATIENT)
Dept: LAB | Facility: HOSPITAL | Age: 27
End: 2022-08-12

## 2022-08-12 DIAGNOSIS — E34.9 ENDOCRINE DISORDER RELATED TO PUBERTY: Primary | ICD-10-CM

## 2022-08-12 DIAGNOSIS — E34.9 ENDOCRINE DISORDER RELATED TO PUBERTY: ICD-10-CM

## 2022-08-12 PROCEDURE — 36415 COLL VENOUS BLD VENIPUNCTURE: CPT

## 2022-08-12 PROCEDURE — 83001 ASSAY OF GONADOTROPIN (FSH): CPT

## 2022-08-13 LAB — FSH SERPL-ACNC: 5.39 MIU/ML

## 2022-10-21 ENCOUNTER — OFFICE VISIT (OUTPATIENT)
Dept: INTERNAL MEDICINE | Facility: CLINIC | Age: 27
End: 2022-10-21

## 2022-10-21 VITALS
DIASTOLIC BLOOD PRESSURE: 82 MMHG | HEART RATE: 91 BPM | TEMPERATURE: 97.3 F | WEIGHT: 205 LBS | OXYGEN SATURATION: 97 % | HEIGHT: 67 IN | BODY MASS INDEX: 32.18 KG/M2 | SYSTOLIC BLOOD PRESSURE: 128 MMHG

## 2022-10-21 DIAGNOSIS — K21.9 HIATAL HERNIA WITH GERD: Primary | ICD-10-CM

## 2022-10-21 DIAGNOSIS — R09.89 GLOBUS SENSATION: ICD-10-CM

## 2022-10-21 DIAGNOSIS — K44.9 HIATAL HERNIA WITH GERD: Primary | ICD-10-CM

## 2022-10-21 DIAGNOSIS — E01.0 THYROMEGALY: ICD-10-CM

## 2022-10-21 DIAGNOSIS — F41.1 GENERALIZED ANXIETY DISORDER: ICD-10-CM

## 2022-10-21 PROCEDURE — 99214 OFFICE O/P EST MOD 30 MIN: CPT | Performed by: NURSE PRACTITIONER

## 2022-10-21 RX ORDER — SERTRALINE HYDROCHLORIDE 25 MG/1
25 TABLET, FILM COATED ORAL DAILY
Qty: 30 TABLET | Refills: 0 | Status: SHIPPED | OUTPATIENT
Start: 2022-10-21 | End: 2022-12-09 | Stop reason: SDUPTHER

## 2022-10-21 NOTE — PROGRESS NOTES
Office Visit      Patient Name: Bertha Monte  : 1995   MRN: [id number]   Care Team: Patient Care Team:  Halina Quezada APRN as PCP - General (Family Medicine)    Chief Complaint  Heartburn (Worsening GERD symptoms, wants to discuss referral to GI) and Anxiety (JUAN-7 score: 15)    Subjective     Subjective      Bertha Monte presents to [facility] for reflux and anxiety.   Reflux has been worsening since she had to change to lansoprazole from dexilant but ultimately feels like it has worsened in the last year.   Complains of dry cough, dry heave, epigastric pain, and globus sensation.    Denies nausea, blood in stool, fever, chills, and sore throat.   She can't pinpoint any food triggers, feels like all food causes the symptoms. She has never seen a GI specialist. Saw a general surgeron and had EGD in 2018, diagnosed with hiatal hernia. Has had a 5 pound weight gain since February.   Anxiety has also been worse in the last few months. She feels very panicky, hasn't had a panic attack but feels like it is about to come one.   She has dealt with anxiety for a while but never tried medication for the problem. Both mom and sister struggle with anxiety and are both on sertraline. No family history of bipolar disorder.     PHQ-2 Depression Screening  Little interest or pleasure in doing things? 0-->not at all   Feeling down, depressed, or hopeless? 0-->not at all   PHQ-2 Total Score 0     JUAN 7 = 15    Review of Systems   Constitutional: Negative for appetite change, fatigue and fever.   HENT: Negative for congestion, sore throat and trouble swallowing.    Eyes: Negative for blurred vision and visual disturbance.   Respiratory: Negative for cough, shortness of breath and wheezing.    Cardiovascular: Negative for chest pain, palpitations and leg swelling.   Gastrointestinal: Positive for abdominal pain, nausea, vomiting (sometimes) and GERD. Negative for blood in stool,  "constipation and diarrhea.   Endocrine: Negative for polydipsia, polyphagia and polyuria.   Genitourinary: Negative for dysuria.   Musculoskeletal: Negative for arthralgias, back pain and myalgias.   Skin: Negative for rash.   Neurological: Negative for dizziness, weakness, light-headedness and headache.   Psychiatric/Behavioral: Negative for sleep disturbance and depressed mood. The patient is nervous/anxious.        Objective     Objective   Vital Signs:   /82   Pulse 91   Temp 97.3 °F (36.3 °C)   Ht 170.2 cm (67\")   Wt 93 kg (205 lb)   SpO2 97%   BMI 32.11 kg/m²     Physical Exam  Vitals and nursing note reviewed.   Constitutional:       General: She is not in acute distress.     Appearance: Normal appearance. She is normal weight. She is not ill-appearing or toxic-appearing.   Eyes:      Pupils: Pupils are equal, round, and reactive to light.   Neck:      Thyroid: Thyromegaly and thyroid tenderness (right sided tenderness) present.   Cardiovascular:      Rate and Rhythm: Normal rate and regular rhythm.      Heart sounds: Normal heart sounds. No murmur heard.  Pulmonary:      Effort: Pulmonary effort is normal. No respiratory distress.      Breath sounds: Normal breath sounds. No wheezing.   Abdominal:      General: Abdomen is flat. Bowel sounds are normal. There is no distension.      Palpations: Abdomen is soft.      Tenderness: There is abdominal tenderness (epigastric). There is no right CVA tenderness or left CVA tenderness.      Hernia: No hernia is present.   Musculoskeletal:      Cervical back: Neck supple.   Skin:     General: Skin is warm and dry.      Findings: No rash.   Neurological:      General: No focal deficit present.      Mental Status: She is alert.   Psychiatric:         Mood and Affect: Mood is anxious.         Behavior: Behavior normal.          Assessment / Plan      Assessment & Plan   Problem List Items Addressed This Visit        Gastrointestinal Abdominal     Hiatal hernia " with GERD - Primary    Relevant Orders    Ambulatory Referral to Gastroenterology    Ambulatory Referral to Behavioral Health    TSH Rfx On Abnormal To Free T4 (Completed)    Vitamin B12 (Completed)    Referral to GI placed for further evaluation. Continue lansoprazole for now and discussed dietary restriction/recommendations.    Other Visit Diagnoses     Generalized anxiety disorder        Relevant Medications    sertraline (Zoloft) 25 MG tablet    Other Relevant Orders    Ambulatory Referral to Behavioral Health    TSH Rfx On Abnormal To Free T4 (Completed)    Vitamin B12 (Completed)    Symptoms uncontrolled. Labs as above today to rule out underlying causes. Begin sertraline. Advised on side effects of the medication including risk of suicidal/homicidal ideations in the first two weeks of use. Instructed to present to the ED or [facility] should she develop these symptoms. Recommended healthy diet, exercise as tolerated, daily sun exposure, sleep hygiene, stress management (stretching/yoga/meditation/journaling/reading), and medication compliance. Discussed counseling referral and she is agreeable. Close follow-up in 1 month.     Globus sensation        Relevant Orders    TSH Rfx On Abnormal To Free T4 (Completed)    Vitamin B12 (Completed)    US Thyroid    Thyromegaly        Relevant Orders    TSH Rfx On Abnormal To Free T4 (Completed)    Vitamin B12 (Completed)    US Thyroid    Thyroid US to further evaluate and TSH pending.          Follow Up   Return in about 4 weeks (around 11/18/2022) for Next scheduled follow up.  Patient was given instructions and counseling regarding her condition or for health maintenance advice. Please see specific information pulled into the AVS if appropriate.     VANCE Kaye  [facility] [place]    Answers for HPI/ROS submitted by the patient on 10/14/2022  Please describe your symptoms.: excessive GERD symptoms, regurgitation, dry heaving  cough. Anxiety problems  Have you had these symptoms before?: Yes  How long have you been having these symptoms?: Greater than 2 weeks  Please list any medications you are currently taking for this condition.: Lansoprazole  Please describe any probable cause for these symptoms. : Hiatal hernia  What is the primary reason for your visit?: Other

## 2022-10-22 LAB
TSH SERPL DL<=0.005 MIU/L-ACNC: 0.72 UIU/ML (ref 0.27–4.2)
VIT B12 SERPL-MCNC: 778 PG/ML (ref 211–946)

## 2022-10-27 ENCOUNTER — TELEPHONE (OUTPATIENT)
Dept: INTERNAL MEDICINE | Facility: CLINIC | Age: 27
End: 2022-10-27

## 2022-10-27 NOTE — TELEPHONE ENCOUNTER
Caller: Bertha Monte    Relationship: Self    Best call back number: [phone]     What is the medical concern/diagnosis:  HIATAL HERNIA AND GERD, ANXIETY    What specialty or service is being requested: GI AND BEHAVIORAL HEALTH    What is the provider, practice or medical service name:   What is the office location:   What is the office phone number:   Any additional details: PLEASE CALL WITH APPOINTMENT INFORMATION

## 2022-11-02 ENCOUNTER — APPOINTMENT (OUTPATIENT)
Dept: ULTRASOUND IMAGING | Facility: HOSPITAL | Age: 27
End: 2022-11-02

## 2022-11-16 ENCOUNTER — APPOINTMENT (OUTPATIENT)
Dept: ULTRASOUND IMAGING | Facility: HOSPITAL | Age: 27
End: 2022-11-16

## 2022-11-21 ENCOUNTER — HOSPITAL ENCOUNTER (OUTPATIENT)
Dept: ULTRASOUND IMAGING | Facility: HOSPITAL | Age: 27
Discharge: HOME | End: 2022-11-21
Admitting: NURSE PRACTITIONER

## 2022-11-21 PROCEDURE — 76536 US EXAM OF HEAD AND NECK: CPT

## 2022-11-29 RX ORDER — LANSOPRAZOLE 30 MG/1
CAPSULE, DELAYED RELEASE ORAL
Qty: 90 CAPSULE | Refills: 1 | Status: SHIPPED | OUTPATIENT
Start: 2022-11-29

## 2022-11-29 NOTE — TELEPHONE ENCOUNTER
Rx Refill Note  Requested Prescriptions     Pending Prescriptions Disp Refills   • lansoprazole (PREVACID) 30 MG capsule [Pharmacy Med Name: LANSOPRAZOLE 30MG DR CAPSULES] 90 capsule 1     Sig: TAKE 1 CAPSULE BY MOUTH EVERY DAY      Last office visit with prescribing clinician: 10/21/2022   Last telemedicine visit with prescribing clinician: 12/9/2022   Next office visit with prescribing clinician: 12/9/2022                       Jennie Gill MA  11/29/22, 16:41 EST

## 2022-12-09 ENCOUNTER — OFFICE VISIT (OUTPATIENT)
Dept: INTERNAL MEDICINE | Facility: CLINIC | Age: 27
End: 2022-12-09

## 2022-12-09 VITALS
DIASTOLIC BLOOD PRESSURE: 88 MMHG | WEIGHT: 201 LBS | SYSTOLIC BLOOD PRESSURE: 132 MMHG | HEART RATE: 68 BPM | HEIGHT: 67 IN | BODY MASS INDEX: 31.55 KG/M2 | TEMPERATURE: 97.1 F | OXYGEN SATURATION: 99 %

## 2022-12-09 DIAGNOSIS — F41.1 GENERALIZED ANXIETY DISORDER: Primary | ICD-10-CM

## 2022-12-09 PROCEDURE — 99214 OFFICE O/P EST MOD 30 MIN: CPT | Performed by: NURSE PRACTITIONER

## 2022-12-09 NOTE — PROGRESS NOTES
"  Office Visit      Patient Name: Bertha Monte  : 1995   MRN: [id number]   Care Team: Patient Care Team:  Halina Quezada APRN as PCP - General (Family Medicine)    Chief Complaint  Anxiety (Follow up on US of thyroid)    Subjective     Subjective      Bertha Monte presents to [facility] for anxiety follow-up.   Started on sertraline last visit for anxiety.   She is taking the medication as prescribed and denies any side effects. She has noticed a decrease in her appetite but not too bothersome.   Denies any suicidal ideations, panic attacks, and she is feeling motivated to do things she enjoys.   She is starting to feel anxiety is improving. Has an appointment with counseling set up for February.   No acute concerns today.   She did  Undergo thyroid US that was normal, small benign cysts considered TIRAD 1 in left lobe. She has an appointment with GI coming up.     Objective     Objective   Vital Signs:   /88   Pulse 68   Temp 97.1 °F (36.2 °C)   Ht 170.2 cm (67\")   Wt 91.2 kg (201 lb)   SpO2 99%   BMI 31.48 kg/m²     Physical Exam  Vitals and nursing note reviewed.   Constitutional:       General: She is not in acute distress.     Appearance: Normal appearance. She is obese. She is not toxic-appearing.   Eyes:      Pupils: Pupils are equal, round, and reactive to light.   Neck:      Vascular: No carotid bruit.   Cardiovascular:      Rate and Rhythm: Normal rate and regular rhythm.      Heart sounds: Normal heart sounds. No murmur heard.  Pulmonary:      Effort: Pulmonary effort is normal. No respiratory distress.      Breath sounds: Normal breath sounds. No wheezing.   Abdominal:      General: Bowel sounds are normal. There is no distension.      Palpations: Abdomen is soft.      Tenderness: There is no abdominal tenderness.   Musculoskeletal:      Cervical back: Neck supple. No tenderness.   Skin:     General: Skin is warm and dry.      Findings: " No rash.   Neurological:      General: No focal deficit present.      Mental Status: She is alert.   Psychiatric:         Mood and Affect: Mood normal.         Behavior: Behavior normal.        Assessment / Plan      Assessment & Plan   Problem List Items Addressed This Visit    None  Visit Diagnoses     Generalized anxiety disorder    -  Primary    Relevant Medications    sertraline (Zoloft) 50 MG tablet\    Improving with medication, increase to 50 mg nightly. Keep appointment with counseling, continue healthy diet, exercise, continue medication, and sleep hygiene encouraged. Will follow-up in 3 months. If any suicidal ideations, needs to go to the ER- verbalized understanding.          Follow Up   Return in about 3 months (around 3/9/2023) for Next scheduled follow up.  Patient was given instructions and counseling regarding her condition or for health maintenance advice. Please see specific information pulled into the AVS if appropriate.     VANCE Kaye  [facility] [place]

## 2023-01-12 ENCOUNTER — TELEPHONE (OUTPATIENT)
Dept: INTERNAL MEDICINE | Facility: CLINIC | Age: 28
End: 2023-01-12
Payer: COMMERCIAL

## 2023-01-12 NOTE — TELEPHONE ENCOUNTER
Caller: Bertha Monte    Relationship to patient: Self    Best call back number: [phone]    Chief complaint: SEVERE BACK PAIN AND NUMBNESS GOING DOWN LEG    Patient directed to call 911 or go to their nearest emergency room.     Patient verbalized understanding: [x] Yes  [] No  If no, why?    Additional notes: PATIENT AGREEABLE TO ER VISIT FOR SYMPTOMS ABOVE

## 2023-01-16 ENCOUNTER — OFFICE VISIT (OUTPATIENT)
Dept: INTERNAL MEDICINE | Facility: CLINIC | Age: 28
End: 2023-01-16
Payer: COMMERCIAL

## 2023-01-16 ENCOUNTER — PRIOR AUTHORIZATION (OUTPATIENT)
Dept: INTERNAL MEDICINE | Facility: CLINIC | Age: 28
End: 2023-01-16
Payer: COMMERCIAL

## 2023-01-16 VITALS
TEMPERATURE: 97 F | HEART RATE: 110 BPM | OXYGEN SATURATION: 97 % | WEIGHT: 196 LBS | BODY MASS INDEX: 30.76 KG/M2 | HEIGHT: 67 IN | DIASTOLIC BLOOD PRESSURE: 84 MMHG | SYSTOLIC BLOOD PRESSURE: 138 MMHG

## 2023-01-16 DIAGNOSIS — M54.31 SCIATICA, RIGHT SIDE: ICD-10-CM

## 2023-01-16 DIAGNOSIS — M54.40 CHRONIC BILATERAL LOW BACK PAIN WITH SCIATICA, SCIATICA LATERALITY UNSPECIFIED: Primary | ICD-10-CM

## 2023-01-16 DIAGNOSIS — G89.29 CHRONIC BILATERAL LOW BACK PAIN WITH SCIATICA, SCIATICA LATERALITY UNSPECIFIED: Primary | ICD-10-CM

## 2023-01-16 PROCEDURE — 99213 OFFICE O/P EST LOW 20 MIN: CPT | Performed by: FAMILY MEDICINE

## 2023-01-16 RX ORDER — CYCLOBENZAPRINE HCL 5 MG
5 TABLET ORAL 3 TIMES DAILY PRN
Qty: 30 TABLET | Refills: 0 | Status: SHIPPED | OUTPATIENT
Start: 2023-01-16 | End: 2023-01-21

## 2023-01-16 RX ORDER — LIDOCAINE 50 MG/G
1 PATCH TOPICAL EVERY 24 HOURS
Qty: 30 EACH | Refills: 0 | Status: SHIPPED | OUTPATIENT
Start: 2023-01-16 | End: 2023-01-22

## 2023-01-16 RX ORDER — MELOXICAM 7.5 MG/1
7.5 TABLET ORAL DAILY PRN
Qty: 30 TABLET | Refills: 1 | Status: SHIPPED | OUTPATIENT
Start: 2023-01-16 | End: 2023-01-30

## 2023-01-16 NOTE — PROGRESS NOTES
Bertha Monte is a 27 y.o. female.    Chief Complaint   Patient presents with   • Sciatica     Right side pain, still present.        HPI   Patient woke up with unbearable pain 5 days ago.  Pain is primarily located in low back and radiates into right leg.  She went to chiropractor, which she has done for several years.  He adjusted her right hip and she then developed burning, sharp shooting pain the following day. She went to the  4 days ago for the pain. She was given mobic, medrol, flexeril, lidocaine patches, and voltaren gel. Pain is 7/10.  Laying on side helps to some extent.  She does admit to physical labor with work.  She had recently been digging holes with work.       The following portions of the patient's history were reviewed and updated as appropriate: allergies, current medications, past family history, past medical history, past social history, past surgical history and problem list.     Allergies   Allergen Reactions   • Eggs Or Egg-Derived Products Other (See Comments)     Sneezing         Current Outpatient Medications:   •  adalimumab (HUMIRA) 40 MG/0.8ML Prefilled Syringe Kit injection, Inject 40 mg under the skin 1 (One) Time., Disp: , Rfl:   •  cetirizine (zyrTEC) 10 MG tablet, Take 10 mg by mouth Daily., Disp: , Rfl:   •  cyclobenzaprine (FLEXERIL) 5 MG tablet, Take 1 tablet by mouth 3 (Three) Times a Day As Needed for Muscle Spasms for up to 5 days., Disp: 30 tablet, Rfl: 0  •  Diclofenac Sodium (VOLTAREN) 1 % gel gel, Apply 4 g topically to the appropriate area as directed 4 (Four) Times a Day As Needed (musculoskeletal pain)., Disp: 100 g, Rfl: 0  •  fluticasone (FLONASE) 50 MCG/ACT nasal spray, 2 sprays into the nostril(s) as directed by provider Daily. 2 puffs each nostril, Disp: 1 bottle, Rfl: 0  •  lansoprazole (PREVACID) 30 MG capsule, TAKE 1 CAPSULE BY MOUTH EVERY DAY, Disp: 90 capsule, Rfl: 1  •  lidocaine (LIDODERM) 5 %, Place 1 patch on the skin as directed by provider  "Daily for 6 days. Remove & Discard patch within 12 hours or as directed by MD, Disp: 30 each, Rfl: 0  •  meloxicam (MOBIC) 7.5 MG tablet, Take 1 tablet by mouth Daily As Needed for Moderate Pain for up to 14 days., Disp: 30 tablet, Rfl: 1  •  methylPREDNISolone (MEDROL) 4 MG dose pack, Take as directed on package instructions., Disp: 21 each, Rfl: 0  •  polyethylene glycol (MIRALAX) powder, 17 g po daily prn constipaton, Disp: 500 g, Rfl: 2  •  sertraline (Zoloft) 50 MG tablet, Take 1 tablet by mouth Daily., Disp: 90 tablet, Rfl: 3    ROS    Review of Systems   Constitutional: Negative for chills and fever.   Respiratory: Negative for cough and shortness of breath.    Cardiovascular: Negative for chest pain.   Gastrointestinal: Positive for nausea. Negative for vomiting.   Musculoskeletal: Positive for arthralgias.   Neurological: Positive for numbness.       Vitals:    01/16/23 1149   BP: 138/84   BP Location: Left arm   Patient Position: Sitting   Cuff Size: Adult   Pulse: 110   Temp: 97 °F (36.1 °C)   SpO2: 97%   Weight: 88.9 kg (196 lb)   Height: 170.2 cm (67\")   PainSc:   7     Body mass index is 30.7 kg/m².    Physical Exam     Physical Exam  Constitutional:       General: She is not in acute distress.     Appearance: Normal appearance. She is well-developed.   HENT:      Head: Normocephalic and atraumatic.      Right Ear: External ear normal.      Left Ear: External ear normal.   Eyes:      Extraocular Movements: Extraocular movements intact.      Conjunctiva/sclera: Conjunctivae normal.   Cardiovascular:      Rate and Rhythm: Regular rhythm. Tachycardia present.      Heart sounds: No murmur heard.  Pulmonary:      Effort: Pulmonary effort is normal. No respiratory distress.   Abdominal:      General: There is no distension.   Musculoskeletal:         General: No tenderness.      Comments: Pain on elevation of left and right legs   Skin:     Coloration: Skin is not pale.   Neurological:      Mental Status: " She is alert and oriented to person, place, and time.      Cranial Nerves: No cranial nerve deficit.   Psychiatric:         Mood and Affect: Mood normal.         Behavior: Behavior normal.         Assessment/Plan    Diagnoses and all orders for this visit:    1. Chronic bilateral low back pain with sciatica, sciatica laterality unspecified (Primary)  -     Ambulatory Referral to Physical Therapy Evaluate and treat    2. Sciatica, right side  -     cyclobenzaprine (FLEXERIL) 5 MG tablet; Take 1 tablet by mouth 3 (Three) Times a Day As Needed for Muscle Spasms for up to 5 days.  Dispense: 30 tablet; Refill: 0  -     meloxicam (MOBIC) 7.5 MG tablet; Take 1 tablet by mouth Daily As Needed for Moderate Pain for up to 14 days.  Dispense: 30 tablet; Refill: 1  -     lidocaine (LIDODERM) 5 %; Place 1 patch on the skin as directed by provider Daily for 6 days. Remove & Discard patch within 12 hours or as directed by MD  Dispense: 30 each; Refill: 0      Encouraged to complete course of medrol, which she just started yesterday.  May take mobic while taking medrol.  May continue flexeril and lidocaine patches as well.  Felt to be muscular.  Reviewed x-rays from , no radiologist read available at this time.  Will refer to PT for further evaluation and treatment.  If no improvement, will plan for an MRI.      New Medications Ordered This Visit   Medications   • cyclobenzaprine (FLEXERIL) 5 MG tablet     Sig: Take 1 tablet by mouth 3 (Three) Times a Day As Needed for Muscle Spasms for up to 5 days.     Dispense:  30 tablet     Refill:  0   • meloxicam (MOBIC) 7.5 MG tablet     Sig: Take 1 tablet by mouth Daily As Needed for Moderate Pain for up to 14 days.     Dispense:  30 tablet     Refill:  1   • lidocaine (LIDODERM) 5 %     Sig: Place 1 patch on the skin as directed by provider Daily for 6 days. Remove & Discard patch within 12 hours or as directed by MD     Dispense:  30 each     Refill:  0       No orders of the defined  types were placed in this encounter.      Return if symptoms worsen or fail to improve.    Elissa Mccabeer, DO

## 2023-01-24 ENCOUNTER — OFFICE VISIT (OUTPATIENT)
Dept: GASTROENTEROLOGY | Facility: CLINIC | Age: 28
End: 2023-01-24
Payer: COMMERCIAL

## 2023-01-24 VITALS
HEART RATE: 95 BPM | OXYGEN SATURATION: 98 % | WEIGHT: 200 LBS | BODY MASS INDEX: 32.14 KG/M2 | HEIGHT: 66 IN | DIASTOLIC BLOOD PRESSURE: 72 MMHG | SYSTOLIC BLOOD PRESSURE: 128 MMHG

## 2023-01-24 DIAGNOSIS — R05.3 CHRONIC COUGH: ICD-10-CM

## 2023-01-24 DIAGNOSIS — K21.9 GASTROESOPHAGEAL REFLUX DISEASE, UNSPECIFIED WHETHER ESOPHAGITIS PRESENT: Primary | ICD-10-CM

## 2023-01-24 DIAGNOSIS — R11.2 NAUSEA AND VOMITING, UNSPECIFIED VOMITING TYPE: ICD-10-CM

## 2023-01-24 DIAGNOSIS — E66.9 CLASS 1 OBESITY WITH BODY MASS INDEX (BMI) OF 32.0 TO 32.9 IN ADULT, UNSPECIFIED OBESITY TYPE, UNSPECIFIED WHETHER SERIOUS COMORBIDITY PRESENT: ICD-10-CM

## 2023-01-24 DIAGNOSIS — Z87.19 HISTORY OF HIATAL HERNIA: ICD-10-CM

## 2023-01-24 PROCEDURE — 99204 OFFICE O/P NEW MOD 45 MIN: CPT | Performed by: PHYSICIAN ASSISTANT

## 2023-01-24 RX ORDER — SODIUM CHLORIDE 9 MG/ML
30 INJECTION, SOLUTION INTRAVENOUS CONTINUOUS PRN
Status: CANCELLED | OUTPATIENT
Start: 2023-01-24

## 2023-01-24 RX ORDER — DEXLANSOPRAZOLE 60 MG/1
60 CAPSULE, DELAYED RELEASE ORAL DAILY
Qty: 30 CAPSULE | Refills: 5 | Status: SHIPPED | OUTPATIENT
Start: 2023-01-24 | End: 2023-03-10 | Stop reason: ALTCHOICE

## 2023-01-24 RX ORDER — FAMOTIDINE 40 MG/1
40 TABLET, FILM COATED ORAL NIGHTLY
Qty: 90 TABLET | Refills: 1 | Status: SHIPPED | OUTPATIENT
Start: 2023-01-24

## 2023-01-24 RX ORDER — CLINDAMYCIN PHOSPHATE 10 MG/G
GEL TOPICAL SEE ADMIN INSTRUCTIONS
COMMUNITY
Start: 2023-01-16

## 2023-01-24 NOTE — PROGRESS NOTES
New Patient Consult      Date: 2023   Patient Name: Bertha Monte  MRN: [id number]  : 1995     Primary Care Provider: Halina Quezada APRN  Referring Provider: Pilar    Chief Complaint   Patient presents with   • Heartburn   • Hiatal Hernia     History of Present Illness: Bertha Monte is a 27 y.o. female who is here today as a consultation with Gastroenterology for evaluation of Heartburn and Hiatal Hernia.    She has been having acid reflux symptoms for the past 5 to 6 years.  Symptoms have been more severe recently and she has developed nausea with vomiting during a coughing episode.  The vomitus is sometimes only acid but also sometimes contains food.  Has been taking Prevacid 30 mg once daily but still severe symptoms.  Previously was taking Dexilant 60 mg once daily with better relief.  She takes Tums in addition to the Prevacid as needed and reflux severe at bedtime.  She has noticed worse reflux when lying supine.  She elevates the head of bed with pillows.  No dysphagia.  She rarely drinks alcohol.  She drinks coffee, water and occasionally soda.  She is a non-smoker.  She does not eat any food close to bedtime.  She has a cough after eating which has been chronic and aggravating.    Had EGD in 2018 by Dr. Feliciano, told she had hiatal hernia. No prior colonoscopy. No current abdominal pain. No change in bowel habits. No rectal bleeding. No recent abdominal imaging. Had labs with PCP in Oct 2022 which were normal. She has HS and takes hiumira inj bi-weekly, also taking Mobic. No other NSAIDs.     Subjective      Past Medical History:   Diagnosis Date   • Allergic 15 years    Seasonal allergies, egg food allergy not severe   • Anxiety 6 months ago   • GERD (gastroesophageal reflux disease)    • Hernia 2018   • Hydradenitis supp    • Hypertension    • Seasonal allergies      Past Surgical History:   Procedure Laterality Date   • ADENOIDECTOMY  2006   • ENDOSCOPY  2018      Braden- hiatal hernia small   • KNEE SURGERY Left    • OTOPLASTY Bilateral 2001   • TONSILLECTOMY  2006     Family History   Problem Relation Age of Onset   • No Known Problems Mother    • Hypertension Father      Social History     Socioeconomic History   • Marital status:    Tobacco Use   • Smoking status: Never   • Smokeless tobacco: Never   Vaping Use   • Vaping Use: Never used   Substance and Sexual Activity   • Alcohol use: Yes     Comment: social   • Drug use: No   • Sexual activity: Yes     Partners: Male     Birth control/protection: None     Current Outpatient Medications:   •  adalimumab (HUMIRA) 40 MG/0.8ML Prefilled Syringe Kit injection, Inject 40 mg under the skin 1 (One) Time., Disp: , Rfl:   •  clindamycin (CLINDAGEL) 1 % gel, Apply  topically to the appropriate area as directed See Admin Instructions. Apply topically to the affected area twice daily, Disp: , Rfl:   •  Diclofenac Sodium (VOLTAREN) 1 % gel gel, Apply 4 g topically to the appropriate area as directed 4 (Four) Times a Day As Needed (musculoskeletal pain)., Disp: 100 g, Rfl: 0  •  lansoprazole (PREVACID) 30 MG capsule, TAKE 1 CAPSULE BY MOUTH EVERY DAY, Disp: 90 capsule, Rfl: 1  •  meloxicam (MOBIC) 7.5 MG tablet, Take 1 tablet by mouth Daily As Needed for Moderate Pain for up to 14 days., Disp: 30 tablet, Rfl: 1  •  sertraline (Zoloft) 50 MG tablet, Take 1 tablet by mouth Daily., Disp: 90 tablet, Rfl: 3    Allergies   Allergen Reactions   • Eggs Or Egg-Derived Products Other (See Comments)     Sneezing     The following portions of the patient's history were reviewed and updated as appropriate: allergies, current medications, past family history, past medical history, past social history, past surgical history and problem list.    Objective     Physical Exam  Vitals reviewed.   Constitutional:       General: She is not in acute distress.     Appearance: Normal appearance. She is well-developed. She is obese. She is not  "ill-appearing or diaphoretic.   HENT:      Head: Normocephalic and atraumatic.      Right Ear: External ear normal.      Left Ear: External ear normal.      Nose: Nose normal.      Mouth/Throat:      Comments: Wearing a mask  Eyes:      General: No scleral icterus.        Right eye: No discharge.         Left eye: No discharge.      Conjunctiva/sclera: Conjunctivae normal.   Neck:      Vascular: No JVD.   Cardiovascular:      Rate and Rhythm: Normal rate and regular rhythm.      Heart sounds: Normal heart sounds. No murmur heard.    No friction rub. No gallop.   Pulmonary:      Effort: Pulmonary effort is normal. No respiratory distress.      Breath sounds: Normal breath sounds. No wheezing or rales.   Chest:      Chest wall: No tenderness.   Abdominal:      General: Bowel sounds are normal. There is no distension.      Palpations: Abdomen is soft. There is no mass.      Tenderness: There is abdominal tenderness (mild RUQ). There is no guarding.   Musculoskeletal:         General: No deformity. Normal range of motion.      Cervical back: Normal range of motion.   Skin:     General: Skin is warm and dry.      Findings: No erythema or rash.   Neurological:      Mental Status: She is alert and oriented to person, place, and time.      Coordination: Coordination normal.   Psychiatric:         Mood and Affect: Mood normal.         Behavior: Behavior normal.         Thought Content: Thought content normal.         Judgment: Judgment normal.       Vitals:    01/24/23 1000   BP: 128/72   Pulse: 95   SpO2: 98%   Weight: 90.7 kg (200 lb)   Height: 167.6 cm (66\")     Results Review:   I have reviewed the patient's new clinical and imaging results.    Office Visit on 10/21/2022   Component Date Value Ref Range Status   • TSH 10/21/2022 0.724  0.270 - 4.200 uIU/mL Final   • Vitamin B-12 10/21/2022 778  211 - 946 pg/mL Final    Results may be falsely increased if patient taking Biotin.      US Thyroid  Result Date: 11/21/2022   " 1.  No suspicious thyroid nodule identified.      US abd limited 2018: liver normal. gb normal.      Assessment / Plan      1. Gastroesophageal reflux disease, unspecified whether esophagitis present  2. Nausea and vomiting, unspecified vomiting type  3. Chronic cough  4. History of hiatal hernia  She has had reflux symptoms for several years now (at least 5-6). She has noticed a worsening of symptoms recently over the past few months. She has severe reflux at night with burning in her esophagus, has chronic cough after meals and will sometimes have severe nausea and vomiting after a coughing episode. No dysphagia. She is a non smoker and nonalcoholic. She does take Mobic and has history of HS. She was told with EGD in 2018 that she has a hiatal hernia. Currently taking prevacid 60 mg once daily without complete relief of symptoms.    EGD will be arranged  Elevate HOB  Avoid NSAIDs  Cut out caffeine  Anti-reflux diet  Continue Prevacid, will try for approval of Dexilant 60 mg once daily  Add pepcid 40 mg nightly    - famotidine (Pepcid) 40 MG tablet; Take 1 tablet by mouth Every Night.  Dispense: 90 tablet; Refill: 1  - dexlansoprazole (Dexilant) 60 MG capsule; Take 1 capsule by mouth Daily.  Dispense: 30 capsule; Refill: 5    She will need an esophagogastroduodenoscopy performed with monitored anesthesia care. The indications, technique, alternatives and potential risk and complications were discussed with the patient including but not limited to bleeding, intestinal perforations, missing lesions and anesthetic complications. The patient understands and wishes to proceed with the procedure and has given their verbal consent. Written patient education information was given to the patient. She should follow up in the office after this procedure to discuss the results and further recommendations can be made at that time. The patient will call if they have further questions before procedure.  - Case Request    5. Class  1 obesity with body mass index (BMI) of 32.0 to 32.9 in adult, unspecified obesity type, unspecified whether serious comorbidity present  Her BMI is 32. She does not have any history of liver disease. Labs 2022 show normal liver enzymes. Abdominal imaging in 2018 showed normal liver. She is at increased risk for development of fatty liver and should work on diet modifications for weight loss.             Follow Up:   Return for follow up after procedure to discuss results.      Victorina Rivera PA-C  [facility]  1/24/2023  10:49 EST    Dictated Utilizing Dragon Dictation: Part of this note may be an electronic transcription/translation of spoken language to printed text using the Dragon Dictation System.

## 2023-02-01 ENCOUNTER — PRIOR AUTHORIZATION (OUTPATIENT)
Dept: GASTROENTEROLOGY | Facility: CLINIC | Age: 28
End: 2023-02-01
Payer: COMMERCIAL

## 2023-02-23 ENCOUNTER — OFFICE VISIT (OUTPATIENT)
Dept: BEHAVIORAL HEALTH | Facility: CLINIC | Age: 28
End: 2023-02-23
Payer: COMMERCIAL

## 2023-02-23 VITALS
HEIGHT: 66 IN | HEART RATE: 94 BPM | DIASTOLIC BLOOD PRESSURE: 84 MMHG | WEIGHT: 196 LBS | SYSTOLIC BLOOD PRESSURE: 138 MMHG | BODY MASS INDEX: 31.5 KG/M2

## 2023-02-23 DIAGNOSIS — F41.1 GENERALIZED ANXIETY DISORDER: Primary | ICD-10-CM

## 2023-02-23 PROCEDURE — 90791 PSYCH DIAGNOSTIC EVALUATION: CPT | Performed by: COUNSELOR

## 2023-03-10 ENCOUNTER — OFFICE VISIT (OUTPATIENT)
Dept: INTERNAL MEDICINE | Facility: CLINIC | Age: 28
End: 2023-03-10
Payer: COMMERCIAL

## 2023-03-10 VITALS
HEIGHT: 66 IN | BODY MASS INDEX: 32.27 KG/M2 | OXYGEN SATURATION: 99 % | SYSTOLIC BLOOD PRESSURE: 128 MMHG | HEART RATE: 90 BPM | RESPIRATION RATE: 17 BRPM | WEIGHT: 200.8 LBS | DIASTOLIC BLOOD PRESSURE: 86 MMHG | TEMPERATURE: 99 F

## 2023-03-10 DIAGNOSIS — M54.16 LUMBAR RADICULOPATHY: ICD-10-CM

## 2023-03-10 DIAGNOSIS — G89.29 CHRONIC BILATERAL LOW BACK PAIN WITH SCIATICA, SCIATICA LATERALITY UNSPECIFIED: Primary | ICD-10-CM

## 2023-03-10 DIAGNOSIS — M54.31 SCIATICA, RIGHT SIDE: ICD-10-CM

## 2023-03-10 DIAGNOSIS — M54.40 CHRONIC BILATERAL LOW BACK PAIN WITH SCIATICA, SCIATICA LATERALITY UNSPECIFIED: Primary | ICD-10-CM

## 2023-03-10 PROCEDURE — 99214 OFFICE O/P EST MOD 30 MIN: CPT | Performed by: NURSE PRACTITIONER

## 2023-03-10 RX ORDER — LORATADINE 10 MG/1
CAPSULE, LIQUID FILLED ORAL
COMMUNITY

## 2023-03-10 RX ORDER — ADALIMUMAB 40MG/0.4ML
40 KIT SUBCUTANEOUS WEEKLY
COMMUNITY

## 2023-03-10 RX ORDER — DEXLANSOPRAZOLE 30 MG/1
CAPSULE, DELAYED RELEASE ORAL
COMMUNITY
End: 2023-03-10 | Stop reason: ALTCHOICE

## 2023-03-10 NOTE — PROGRESS NOTES
"  Office Visit      Patient Name: Bertha Monte  : 1995   MRN: [id number]   Care Team: Patient Care Team:  Halina Quezada APRN as PCP - General (Family Medicine)  Sofya Tay LPCC as Counselor (Behavioral Health)    Chief Complaint  Follow-up (Sciatica)    Subjective     Subjective      Bertha Monte presents to [facility] for follow-up of back pain.   Saw at [facility]  2 months ago then saw provider here. She was given steroid taper, cyclobenzaprine, and meloxicam. She has stopped all of these medications. If pain is severe she will take an 800 mg motrin over the counter but trying to avoid medications currently. Was referred to physical therapy and has been participating. She does feel like PT has helped. Rates pain a 5/10 today. Pain is located in her mid thigh up to her glute on the right side. She also still endorses numbness, there all the time not just certain positions.   Denies bowel/bladder incontinence, recent trauma or injury to the lumbar spine, and worsening symptoms.   She does feels like symptoms have improved, feels it is minimal though.     Objective     Objective   Vital Signs:   /86 (BP Location: Left arm, Patient Position: Sitting, Cuff Size: Adult)   Pulse 90   Temp 99 °F (37.2 °C) (Temporal)   Resp 17   Ht 167.6 cm (65.98\")   Wt 91.1 kg (200 lb 12.8 oz)   SpO2 99%   BMI 32.43 kg/m²     Physical Exam  Vitals and nursing note reviewed.   Constitutional:       General: She is not in acute distress.     Appearance: Normal appearance.   Cardiovascular:      Rate and Rhythm: Normal rate and regular rhythm.      Heart sounds: Normal heart sounds. No murmur heard.  Pulmonary:      Effort: Pulmonary effort is normal.      Breath sounds: Normal breath sounds. No wheezing.   Abdominal:      General: Bowel sounds are normal.      Palpations: Abdomen is soft.      Tenderness: There is no right CVA tenderness or left CVA tenderness. "   Musculoskeletal:      Cervical back: Normal and normal range of motion. No muscular tenderness.      Thoracic back: Normal.      Lumbar back: Tenderness present. Decreased range of motion. Positive left straight leg raise test. Negative right straight leg raise test.        Back:       Comments:      Skin:     General: Skin is warm and dry.      Findings: No erythema or rash.   Neurological:      Mental Status: She is alert.      Motor: No weakness.      Deep Tendon Reflexes: Reflexes normal.   Psychiatric:         Mood and Affect: Mood normal.         Behavior: Behavior normal.        Assessment / Plan      Assessment & Plan   Problem List Items Addressed This Visit    None  Visit Diagnoses     Chronic bilateral low back pain with sciatica, sciatica laterality unspecified    -  Primary    Relevant Orders    MRI Lumbar Spine Without Contrast    Sciatica, right side        Relevant Orders    MRI Lumbar Spine Without Contrast    Lumbar radiculopathy        Relevant Orders    MRI Lumbar Spine Without Contrast    Concerns for underlying disc disease, MRI ordered to further assess. Continue physical therapy, heat/ice, and NSAIDs as needed. She will follow-up here in 6 weeks to discuss results and for physical with pap smear.         Follow Up   Return in about 6 weeks (around 4/21/2023) for Annual with pap smear.  Patient was given instructions and counseling regarding her condition or for health maintenance advice. Please see specific information pulled into the AVS if appropriate.     VANCE Kaye  [facility]

## 2023-03-13 PROBLEM — Z87.19 HISTORY OF HIATAL HERNIA: Status: ACTIVE | Noted: 2023-03-13
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
## 2023-03-21 ENCOUNTER — OFFICE VISIT (OUTPATIENT)
Dept: BEHAVIORAL HEALTH | Facility: CLINIC | Age: 28
End: 2023-03-21
Payer: COMMERCIAL

## 2023-03-21 VITALS — HEIGHT: 66 IN | BODY MASS INDEX: 32.3 KG/M2 | WEIGHT: 201 LBS

## 2023-03-21 DIAGNOSIS — F41.1 GENERALIZED ANXIETY DISORDER: Primary | ICD-10-CM

## 2023-03-21 PROCEDURE — 90832 PSYTX W PT 30 MINUTES: CPT | Performed by: COUNSELOR

## 2023-03-24 ENCOUNTER — HOSPITAL ENCOUNTER (OUTPATIENT)
Facility: HOSPITAL | Age: 28
Setting detail: HOSPITAL OUTPATIENT SURGERY
Discharge: HOME | End: 2023-03-24
Attending: INTERNAL MEDICINE | Admitting: INTERNAL MEDICINE
Payer: COMMERCIAL

## 2023-03-24 ENCOUNTER — ANESTHESIA EVENT (OUTPATIENT)
Dept: GASTROENTEROLOGY | Facility: HOSPITAL | Age: 28
End: 2023-03-24
Payer: COMMERCIAL

## 2023-03-24 ENCOUNTER — ANESTHESIA (OUTPATIENT)
Dept: GASTROENTEROLOGY | Facility: HOSPITAL | Age: 28
End: 2023-03-24
Payer: COMMERCIAL

## 2023-03-24 VITALS
OXYGEN SATURATION: 99 % | DIASTOLIC BLOOD PRESSURE: 97 MMHG | TEMPERATURE: 97.7 F | SYSTOLIC BLOOD PRESSURE: 137 MMHG | HEART RATE: 77 BPM | RESPIRATION RATE: 20 BRPM

## 2023-03-24 DIAGNOSIS — K21.9 GASTROESOPHAGEAL REFLUX DISEASE, UNSPECIFIED WHETHER ESOPHAGITIS PRESENT: ICD-10-CM

## 2023-03-24 DIAGNOSIS — Z87.19 HISTORY OF HIATAL HERNIA: ICD-10-CM

## 2023-03-24 LAB
B-HCG UR QL: NEGATIVE
EXPIRATION DATE: NORMAL
INTERNAL NEGATIVE CONTROL: NORMAL
INTERNAL POSITIVE CONTROL: NORMAL
Lab: NORMAL

## 2023-03-24 PROCEDURE — 43239 EGD BIOPSY SINGLE/MULTIPLE: CPT | Performed by: INTERNAL MEDICINE

## 2023-03-24 PROCEDURE — 25010000002 PROPOFOL 10 MG/ML EMULSION: Performed by: NURSE ANESTHETIST, CERTIFIED REGISTERED

## 2023-03-24 PROCEDURE — 81025 URINE PREGNANCY TEST: CPT | Performed by: INTERNAL MEDICINE

## 2023-03-24 RX ORDER — METOPROLOL TARTRATE 5 MG/5ML
INJECTION INTRAVENOUS AS NEEDED
Status: DISCONTINUED | OUTPATIENT
Start: 2023-03-24 | End: 2023-03-24 | Stop reason: SURG

## 2023-03-24 RX ORDER — PROPOFOL 10 MG/ML
VIAL (ML) INTRAVENOUS AS NEEDED
Status: DISCONTINUED | OUTPATIENT
Start: 2023-03-24 | End: 2023-03-24 | Stop reason: SURG

## 2023-03-24 RX ORDER — LIDOCAINE HYDROCHLORIDE 20 MG/ML
INJECTION, SOLUTION INTRAVENOUS AS NEEDED
Status: DISCONTINUED | OUTPATIENT
Start: 2023-03-24 | End: 2023-03-24 | Stop reason: SURG

## 2023-03-24 RX ORDER — ONDANSETRON 2 MG/ML
4 INJECTION INTRAMUSCULAR; INTRAVENOUS ONCE AS NEEDED
Status: CANCELLED | OUTPATIENT
Start: 2023-03-24 | End: 2023-03-24

## 2023-03-24 RX ORDER — SODIUM CHLORIDE 9 MG/ML
30 INJECTION, SOLUTION INTRAVENOUS CONTINUOUS PRN
Status: DISCONTINUED | OUTPATIENT
Start: 2023-03-24 | End: 2023-03-24 | Stop reason: HOSPADM

## 2023-03-24 RX ADMIN — PROPOFOL 300 MG: 10 INJECTION, EMULSION INTRAVENOUS at 08:08

## 2023-03-24 RX ADMIN — LIDOCAINE HYDROCHLORIDE 100 MG: 20 INJECTION, SOLUTION INTRAVENOUS at 08:08

## 2023-03-24 RX ADMIN — METOPROLOL TARTRATE 5 MG: 1 INJECTION, SOLUTION INTRAVENOUS at 08:08

## 2023-03-24 RX ADMIN — SODIUM CHLORIDE 30 ML/HR: 9 INJECTION, SOLUTION INTRAVENOUS at 07:01

## 2023-03-24 NOTE — ANESTHESIA PREPROCEDURE EVALUATION
Anesthesia Evaluation     Patient summary reviewed and Nursing notes reviewed   no history of anesthetic complications:  NPO Solid Status: > 8 hours  NPO Liquid Status: > 8 hours           Airway   Mallampati: II  TM distance: >3 FB  Neck ROM: full  no difficulty expected and Possible difficult intubation  Dental - normal exam     Pulmonary - negative pulmonary ROS and normal exam   Cardiovascular - normal exam    Rhythm: regular  Rate: normal    (+) hypertension,       Neuro/Psych  (+) psychiatric history Anxiety and Depression,    GI/Hepatic/Renal/Endo    (+) obesity,  hiatal hernia, GERD,      Musculoskeletal (-) negative ROS    Abdominal    Substance History - negative use     OB/GYN negative ob/gyn ROS         Other - negative ROS                       Anesthesia Plan    ASA 2     MAC     (Pt told that intravenous sedation will be used as the primary anesthetic along with local anesthesia if necessary. Every effort will be made to make sure the patient is comfortable.     The patient was told they may or may not have recall for the procedure. It was further explained that if the MAC was not adequate that a general anesthetic with either an LMA or endotracheal tube would be required.     Will proceed with the plan of care.)  intravenous induction     Anesthetic plan, risks, benefits, and alternatives have been provided, discussed and informed consent has been obtained with: patient.        CODE STATUS:

## 2023-03-24 NOTE — ANESTHESIA POSTPROCEDURE EVALUATION
Patient: Bertha Monte    Procedure Summary     Date: 03/24/23 Room / Location: [facility] ENDOSCOPY 1 / [facility] ENDOSCOPY    Anesthesia Start: 0805 Anesthesia Stop: 0818    Procedure: ESOPHAGOGASTRODUODENOSCOPY WITH BIOPSY  Diagnosis:       Gastroesophageal reflux disease, unspecified whether esophagitis present      History of hiatal hernia      (Gastroesophageal reflux disease, unspecified whether esophagitis present [K21.9])      (History of hiatal hernia [Z87.19])    Surgeons: Garo Limon MD Provider: Vishal Christine CRNA    Anesthesia Type: MAC ASA Status: 2          Anesthesia Type: MAC    Vitals  Vitals Value Taken Time   /97 03/24/23 0841   Temp 97.7 °F (36.5 °C) 03/24/23 0820   Pulse 77 03/24/23 0841   Resp 20 03/24/23 0841   SpO2 99 % 03/24/23 0841           Post Anesthesia Care and Evaluation    Patient location during evaluation: PHASE II  Patient participation: complete - patient participated  Level of consciousness: awake  Pain score: 1  Pain management: adequate    Airway patency: patent  Anesthetic complications: No anesthetic complications  PONV Status: controlled  Cardiovascular status: acceptable and stable  Respiratory status: acceptable  Hydration status: acceptable

## 2023-03-24 NOTE — H&P
[facility]  HISTORY AND PHYSICAL    Patient Name: Bertha Monte  : 1995  MRN: [id number]    Chief Complaint:   For EGD    History Of Presenting Illness:      Nausea vomiting  GERD  Chronic cough    Past Medical History:   Diagnosis Date   • Allergic 15 years    egg food allergy not severe   • Anxiety 6 months ago   • GERD (gastroesophageal reflux disease)    • Hernia 2018   • Hydradenitis supp    • Hypertension     states no longer high; no medication required   • Seasonal allergies        Past Surgical History:   Procedure Laterality Date   • ADENOIDECTOMY     • ENDOSCOPY  2019    Dr. Feliciano- 1 cm hiatal hernia, mild esophagitis, bile aspirate taken for crystal analysis and negative. No other path available.   • KNEE SURGERY Left     ACL & meniscus repair   • OTOPLASTY Bilateral    • SKIN BIOPSY     • TONSILLECTOMY         Social History     Socioeconomic History   • Marital status:    Tobacco Use   • Smoking status: Never   • Smokeless tobacco: Never   Vaping Use   • Vaping Use: Never used   Substance and Sexual Activity   • Alcohol use: Not Currently   • Drug use: No   • Sexual activity: Defer       Family History   Problem Relation Age of Onset   • No Known Problems Mother    • Hypertension Father        Prior to Admission Medications:  Medications Prior to Admission   Medication Sig Dispense Refill Last Dose   • Adalimumab (Humira Pen) 40 MG/0.4ML Pen-injector Kit 40 mg 1 (One) Time Per Week.   Past Week   • clindamycin (CLINDAGEL) 1 % gel Apply  topically to the appropriate area as directed See Admin Instructions. Apply topically to the affected area twice daily   Past Week   • Diclofenac Sodium (VOLTAREN) 1 % gel gel Apply 4 g topically to the appropriate area as directed 4 (Four) Times a Day As Needed (musculoskeletal pain). 100 g 0 Past Week   • famotidine (Pepcid) 40 MG tablet Take 1 tablet by mouth Every Night. 90 tablet 1 3/23/2023 at 2100   •  lansoprazole (PREVACID) 30 MG capsule TAKE 1 CAPSULE BY MOUTH EVERY DAY 90 capsule 1 3/23/2023 at 0800   • Loratadine 10 MG capsule Claritin   Past Week   • Prenatal Vit-Fe Fumarate-FA (PRENATAL 1+1 PO) Prenatal   3/23/2023 at 2100   • sertraline (Zoloft) 50 MG tablet Take 1 tablet by mouth Daily. 90 tablet 3 3/23/2023 at 2100       Allergies:  Allergies   Allergen Reactions   • Eggs Or Egg-Derived Products Other (See Comments)     Sneezing        Vitals: Temp:  [97.6 °F (36.4 °C)] 97.6 °F (36.4 °C)  Heart Rate:  [91] 91  Resp:  [18] 18  BP: (150)/(98) 150/98    Review Of Systems:  Constitutional:  Negative for chills, fever, and unexpected weight change.  Respiratory:  Negative for cough, chest tightness, shortness of breath, and wheezing.  Cardiovascular:  Negative for chest pain, palpitations, and leg swelling.  Gastrointestinal:  Negative for abdominal distention, abdominal pain, nausea, vomiting.  Neurological:  Negative for weakness, numbness, and headaches.     Physical Exam:    General Appearance:  Alert, cooperative, in no acute distress.   Lungs:   Clear to auscultation, respirations regular, even and                 unlabored.   Heart:  Regular rhythm and normal rate.   Abdomen:   Normal bowel sounds, no masses, no organomegaly. Soft, nontender, nondistended   Neurologic: Alert and oriented x 3. Moves all four limbs equally       Assessment & Plan     Assessment:  Active Problems:    Hiatal hernia with GERD    History of hiatal hernia      Plan: ESOPHAGOGASTRODUODENOSCOPY WITH BIOPSY CPT CODE: 87555 (N/A)     Garo Limon MD  3/24/2023

## 2023-03-24 NOTE — DISCHARGE INSTRUCTIONS
- Discharge patient to home (ambulatory).   - Low fiber small meals  - Reflux diet  - Abstinence from coffee for now  - PPI daily  - Avoid NSAIDS  - Continue present medications.   - Await pathology results.   - Return to my office in 8 weeks.   To assist you in voiding:  Drink plenty of fluids  Listen to running water while attempting to void.    If you are unable to urinate and you have an uncomfortable urge to void or it has been   6 hours since you were discharged, return to the Emergency Room Please follow all post op instructions and follow up appointment time from your physician's office included in your discharge packet.  .  Rest today  No pushing,pulling,tugging,heavy lifting, or strenuous activity   No major decision making,driving,or drinking alcoholic beverages for 24 hours due to the sedation you received  Always use good hand hygiene/washing technique  No driving on pain medication.

## 2023-03-27 LAB — REF LAB TEST METHOD: NORMAL
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
## 2023-04-07 ENCOUNTER — HOSPITAL ENCOUNTER (OUTPATIENT)
Dept: MRI IMAGING | Facility: HOSPITAL | Age: 28
Discharge: HOME | End: 2023-04-07
Admitting: NURSE PRACTITIONER
Payer: COMMERCIAL

## 2023-04-07 DIAGNOSIS — M54.40 CHRONIC BILATERAL LOW BACK PAIN WITH SCIATICA, SCIATICA LATERALITY UNSPECIFIED: ICD-10-CM

## 2023-04-07 DIAGNOSIS — G89.29 CHRONIC BILATERAL LOW BACK PAIN WITH SCIATICA, SCIATICA LATERALITY UNSPECIFIED: ICD-10-CM

## 2023-04-07 DIAGNOSIS — M54.16 LUMBAR RADICULOPATHY: ICD-10-CM

## 2023-04-07 DIAGNOSIS — M54.31 SCIATICA, RIGHT SIDE: ICD-10-CM

## 2023-04-07 PROCEDURE — 72148 MRI LUMBAR SPINE W/O DYE: CPT

## 2023-04-14 ENCOUNTER — TELEPHONE (OUTPATIENT)
Dept: INTERNAL MEDICINE | Facility: CLINIC | Age: 28
End: 2023-04-14
Payer: COMMERCIAL

## 2023-04-14 DIAGNOSIS — M48.062 SPINAL STENOSIS OF LUMBAR REGION WITH NEUROGENIC CLAUDICATION: Primary | ICD-10-CM

## 2023-04-14 DIAGNOSIS — R93.7 ABNORMAL MRI, LUMBAR SPINE: ICD-10-CM

## 2023-04-14 NOTE — TELEPHONE ENCOUNTER
Caller: Bertha Monte    Relationship: Self    Best call back number: [phone]    Caller requesting test results: PATIENT    What test was performed: MRI OF SPINE    When was the test performed: 04.07.23    Where was the test performed: [facility]    Additional notes: PATIENT WOULD LIKE A CALL BACK WITH RESULTS ASAP PLEASE.

## 2023-04-14 NOTE — TELEPHONE ENCOUNTER
Spoke with patient, advised that we only have preliminary results at this time. Advised that we will call her when results have been received and reviewed by the provider.

## 2023-04-20 NOTE — TELEPHONE ENCOUNTER
Caller: Bertha Monte    Relationship: Self    Best call back number: [phone]    Caller requesting test results: SELF    What test was performed: MRI OF SPINE    When was the test performed: 4/7/23    Where was the test performed: [facility]    Additional notes: PATIENT CHECKING STATUS OF RESULTS. HAS NOT HEARD BACK. PLEASE CALL

## 2023-04-20 NOTE — TELEPHONE ENCOUNTER
I called Radiology they state the report is complete just not signed and the Doctor has not been there for a while.

## 2023-04-21 NOTE — TELEPHONE ENCOUNTER
Covering for Halina while she's out. Report not signed officially but there is a read. Large disc bulge with severe narrowing of spinal canal. Needs to see neurosurgery. While awaiting this consult if she has any new issues such as urinary/bowel incontinence without awareness, inability to ambulate she would need to seek care at ER. Referral has been placed.

## 2023-04-26 ENCOUNTER — TELEPHONE (OUTPATIENT)
Dept: INTERNAL MEDICINE | Facility: CLINIC | Age: 28
End: 2023-04-26

## 2023-04-26 NOTE — TELEPHONE ENCOUNTER
Caller: Bertha Monte    Relationship: Self    Best call back number:     [phone]    What is the medical concern/diagnosis:     MRI RESULTS    What specialty or service is being requested:     NEUROSURGEON    What is the provider, practice or medical service name:     PATIENT STATED SHE HAS NOT HEARD FROM THE REFERRAL OFFICE FOR SCHEDULING AS OF YET    PATIENT REQUESTED A CALL BACK WITH THE NAME OF THE NEUROSURGEON AND THE OFFICE LOCATION FOR THE REFERRAL    REFERRAL COORDINATOR     [FreeTextEntry1] :  - Continue with all current treatments.\par  - Labs done in office\par - Further management pending lab results

## 2023-05-05 ENCOUNTER — OFFICE VISIT (OUTPATIENT)
Dept: NEUROSURGERY | Facility: CLINIC | Age: 28
End: 2023-05-05
Payer: COMMERCIAL

## 2023-05-05 ENCOUNTER — PREP FOR SURGERY (OUTPATIENT)
Dept: OTHER | Facility: HOSPITAL | Age: 28
End: 2023-05-05
Payer: COMMERCIAL

## 2023-05-05 VITALS — HEIGHT: 66 IN | BODY MASS INDEX: 31.98 KG/M2 | TEMPERATURE: 98.2 F | WEIGHT: 199 LBS

## 2023-05-05 DIAGNOSIS — M51.26 HNP (HERNIATED NUCLEUS PULPOSUS), LUMBAR: Primary | ICD-10-CM

## 2023-05-05 DIAGNOSIS — M51.16 LUMBAR DISC HERNIATION WITH RADICULOPATHY: Primary | ICD-10-CM

## 2023-05-05 DIAGNOSIS — M51.36 DDD (DEGENERATIVE DISC DISEASE), LUMBAR: ICD-10-CM

## 2023-05-05 PROCEDURE — 99204 OFFICE O/P NEW MOD 45 MIN: CPT | Performed by: NEUROLOGICAL SURGERY

## 2023-05-05 RX ORDER — FAMOTIDINE 20 MG/1
20 TABLET, FILM COATED ORAL
OUTPATIENT
Start: 2023-05-05

## 2023-05-05 RX ORDER — CHLORHEXIDINE GLUCONATE 4 G/100ML
SOLUTION TOPICAL
Qty: 120 ML | Refills: 0 | Status: SHIPPED | OUTPATIENT
Start: 2023-05-05

## 2023-05-05 RX ORDER — CEFAZOLIN SODIUM 2 G/100ML
2 INJECTION, SOLUTION INTRAVENOUS ONCE
OUTPATIENT
Start: 2023-05-05 | End: 2023-05-05

## 2023-05-05 NOTE — PROGRESS NOTES
Patient: Bertha Monte  : 1995    Primary Care Provider: Halina Quezada APRN    Requesting Provider: As above        History    Chief Complaint: Low back and right lower extremity pain with sensory alteration.    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  Typically the buttock and thigh are involved.  Earlier on she had more pain more distally.  As of late she started to get some pain in her left hip as well.  She has some numbness in her right leg.  She denies bowel or bladder dysfunction.  Her pain is worse with activity such as bending or twisting.  She has done physical therapy.  She has taken ibuprofen and Flexeril.  Her symptoms remain burdensome.    Review of Systems   Constitutional: Negative for activity change, appetite change, chills, diaphoresis, fatigue, fever and unexpected weight change.   HENT: Negative for congestion, dental problem, drooling, ear discharge, ear pain, facial swelling, hearing loss, mouth sores, nosebleeds, postnasal drip, rhinorrhea, sinus pressure, sinus pain, sneezing, sore throat, tinnitus, trouble swallowing and voice change.    Eyes: Negative for photophobia, pain, discharge, redness, itching and visual disturbance.   Respiratory: Negative for apnea, cough, choking, chest tightness, shortness of breath, wheezing and stridor.    Cardiovascular: Negative for chest pain, palpitations and leg swelling.   Gastrointestinal: Negative for abdominal distention, abdominal pain, anal bleeding, blood in stool, constipation, diarrhea, nausea, rectal pain and vomiting.   Endocrine: Negative for cold intolerance, heat intolerance, polydipsia, polyphagia and polyuria.   Genitourinary: Negative for decreased urine volume, difficulty urinating, dyspareunia, dysuria, enuresis, flank pain, frequency, genital sores, hematuria, menstrual problem, pelvic pain,  "urgency, vaginal bleeding, vaginal discharge and vaginal pain.   Musculoskeletal: Positive for back pain, myalgias, neck pain and neck stiffness. Negative for arthralgias, gait problem and joint swelling.   Skin: Negative for color change, pallor, rash and wound.   Allergic/Immunologic: Negative for environmental allergies, food allergies and immunocompromised state.   Neurological: Positive for numbness and headaches. Negative for dizziness, tremors, seizures, syncope, facial asymmetry, speech difficulty, weakness and light-headedness.   Hematological: Negative for adenopathy. Does not bruise/bleed easily.   Psychiatric/Behavioral: Negative for agitation, behavioral problems, confusion, decreased concentration, dysphoric mood, hallucinations, self-injury, sleep disturbance and suicidal ideas. The patient is not nervous/anxious and is not hyperactive.        The patient's past medical history, past surgical history, family history, and social history have been reviewed at length in the electronic medical record.      Physical Exam:   Temp 98.2 °F (36.8 °C)   Ht 167.6 cm (66\")   Wt 90.3 kg (199 lb)   BMI 32.12 kg/m²   CONSTITUTIONAL: Patient is well-nourished, pleasant and appears stated age.  MUSCULOSKELETAL:  Straight leg raising is positive on the right at 30 degrees.  Carlos's Sign is negative.  ROM in the low back is normal.  Tenderness in the back to palpation is not observed.  NEUROLOGICAL:  Orientation, memory, attention span, language function, and cognition have been examined and are intact.  Strength is intact in the lower extremities to direct testing.  Muscle tone is normal throughout.  Station and gait are normal.  Sensation is intact to light touch testing throughout.  Deep tendon reflexes are 1+ and symmetrical.  Coordination is intact.      Medical Decision Making    Data Review:   (All imaging studies were personally reviewed unless stated otherwise)  MRI of the lumbar spine dated 4/7/2023 " demonstrates degenerative disc disease at L5-S1 with disc base narrowing.  There is a huge central disc herniation that severely compromising the spinal canal.    Diagnosis:   L5-S1 disc herniation with right S1 radiculopathy.    Treatment Options:   Given her ongoing symptoms and the sheer size of the disc herniation I have recommended right L5-S1 discectomy.  The nature of the procedure as well as the potential risks, complications, limitations, and alternatives to the procedure were discussed at length with the patient and the patient has agreed to proceed with surgery.       Diagnosis Plan   1. Lumbar disc herniation with radiculopathy        2. DDD (degenerative disc disease), lumbar            Scribed for Daniel Garibay MD by CAT Levi 5/5/2023 12:38 EDT      I, Dr. Garibay, personally performed the services described in the documentation, as scribed in my presence, and it is both accurate and complete.

## 2023-05-05 NOTE — H&P
Patient: Bertha Monte  : 1995     Primary Care Provider: Halina Quezada APRN     Requesting Provider: As above           History     Chief Complaint: Low back and right lower extremity pain with sensory alteration.     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  Typically the buttock and thigh are involved.  Earlier on she had more pain more distally.  As of late she started to get some pain in her left hip as well.  She has some numbness in her right leg.  She denies bowel or bladder dysfunction.  Her pain is worse with activity such as bending or twisting.  She has done physical therapy.  She has taken ibuprofen and Flexeril.  Her symptoms remain burdensome.     Review of Systems   Constitutional: Negative for activity change, appetite change, chills, diaphoresis, fatigue, fever and unexpected weight change.   HENT: Negative for congestion, dental problem, drooling, ear discharge, ear pain, facial swelling, hearing loss, mouth sores, nosebleeds, postnasal drip, rhinorrhea, sinus pressure, sinus pain, sneezing, sore throat, tinnitus, trouble swallowing and voice change.    Eyes: Negative for photophobia, pain, discharge, redness, itching and visual disturbance.   Respiratory: Negative for apnea, cough, choking, chest tightness, shortness of breath, wheezing and stridor.    Cardiovascular: Negative for chest pain, palpitations and leg swelling.   Gastrointestinal: Negative for abdominal distention, abdominal pain, anal bleeding, blood in stool, constipation, diarrhea, nausea, rectal pain and vomiting.   Endocrine: Negative for cold intolerance, heat intolerance, polydipsia, polyphagia and polyuria.   Genitourinary: Negative for decreased urine volume, difficulty urinating, dyspareunia, dysuria, enuresis, flank pain, frequency, genital sores, hematuria, menstrual problem, pelvic  pain, urgency, vaginal bleeding, vaginal discharge and vaginal pain.   Musculoskeletal: Positive for back pain, myalgias, neck pain and neck stiffness. Negative for arthralgias, gait problem and joint swelling.   Skin: Negative for color change, pallor, rash and wound.   Allergic/Immunologic: Negative for environmental allergies, food allergies and immunocompromised state.   Neurological: Positive for numbness and headaches. Negative for dizziness, tremors, seizures, syncope, facial asymmetry, speech difficulty, weakness and light-headedness.   Hematological: Negative for adenopathy. Does not bruise/bleed easily.   Psychiatric/Behavioral: Negative for agitation, behavioral problems, confusion, decreased concentration, dysphoric mood, hallucinations, self-injury, sleep disturbance and suicidal ideas. The patient is not nervous/anxious and is not hyperactive.          The patient's past medical history, past surgical history, family history, and social history have been reviewed at length in the electronic medical record.  Past Medical History:   Diagnosis Date   • Allergic 15 years    egg food allergy not severe   • Anemia    • Anxiety 6 months ago   • GERD (gastroesophageal reflux disease)    • Hernia 2018   • Hydradenitis supp    • Hypertension     states no longer high; no medication required   • Low back pain    • Seasonal allergies      Past Surgical History:   Procedure Laterality Date   • ADENOIDECTOMY  2006   • ENDOSCOPY  02/20/2019    Dr. Feliciano- 1 cm hiatal hernia, mild esophagitis, bile aspirate taken for crystal analysis and negative. No other path available.   • ENDOSCOPY N/A 3/24/2023    Procedure: ESOPHAGOGASTRODUODENOSCOPY WITH BIOPSY ;  Surgeon: Garo Limon MD;  Location: [facility] ENDOSCOPY;  Service: Gastroenterology;  Laterality: N/A;   • KNEE SURGERY Left     ACL & meniscus repair   • OTOPLASTY Bilateral 2001   • SKIN BIOPSY     • TONSILLECTOMY  2006     Family History   Problem Relation  "Age of Onset   • No Known Problems Mother    • Hypertension Father      Social History     Socioeconomic History   • Marital status:    Tobacco Use   • Smoking status: Never   • Smokeless tobacco: Never   Vaping Use   • Vaping Use: Never used   Substance and Sexual Activity   • Alcohol use: Not Currently   • Drug use: No   • Sexual activity: Defer           Allergies   Allergen Reactions   • Eggs Or Egg-Derived Products Other (See Comments)     Sneezing       Current Outpatient Medications on File Prior to Visit   Medication Sig Dispense Refill   • Adalimumab (Humira Pen) 40 MG/0.4ML Pen-injector Kit 40 mg 1 (One) Time Per Week.     • clindamycin (CLINDAGEL) 1 % gel Apply  topically to the appropriate area as directed See Admin Instructions. Apply topically to the affected area twice daily     • Diclofenac Sodium (VOLTAREN) 1 % gel gel Apply 4 g topically to the appropriate area as directed 4 (Four) Times a Day As Needed (musculoskeletal pain). 100 g 0   • famotidine (Pepcid) 40 MG tablet Take 1 tablet by mouth Every Night. 90 tablet 1   • lansoprazole (PREVACID) 30 MG capsule TAKE 1 CAPSULE BY MOUTH EVERY DAY 90 capsule 1   • Loratadine 10 MG capsule Claritin     • Prenatal Vit-Fe Fumarate-FA (PRENATAL 1+1 PO) Prenatal     • sertraline (Zoloft) 50 MG tablet Take 1 tablet by mouth Daily. 90 tablet 3     No current facility-administered medications on file prior to visit.            Physical Exam:   Temp 98.2 °F (36.8 °C)   Ht 167.6 cm (66\")   Wt 90.3 kg (199 lb)   BMI 32.12 kg/m²   CONSTITUTIONAL: Patient is well-nourished, pleasant and appears stated age.  MUSCULOSKELETAL:  Straight leg raising is positive on the right at 30 degrees.  Carlos's Sign is negative.  ROM in the low back is normal.  Tenderness in the back to palpation is not observed.  NEUROLOGICAL:  Orientation, memory, attention span, language function, and cognition have been examined and are intact.  Strength is intact in the lower " extremities to direct testing.  Muscle tone is normal throughout.  Station and gait are normal.  Sensation is intact to light touch testing throughout.  Deep tendon reflexes are 1+ and symmetrical.  Coordination is intact.        Medical Decision Making     Data Review:   (All imaging studies were personally reviewed unless stated otherwise)  MRI of the lumbar spine dated 4/7/2023 demonstrates degenerative disc disease at L5-S1 with disc base narrowing.  There is a huge central disc herniation that severely compromising the spinal canal.     Diagnosis:   L5-S1 disc herniation with right S1 radiculopathy.     Treatment Options:   Given her ongoing symptoms and the sheer size of the disc herniation I have recommended right L5-S1 discectomy.  The nature of the procedure as well as the potential risks, complications, limitations, and alternatives to the procedure were discussed at length with the patient and the patient has agreed to proceed with surgery.          Diagnosis Plan   1. Lumbar disc herniation with radiculopathy          2. DDD (degenerative disc disease), lumbar

## 2023-05-05 NOTE — H&P (VIEW-ONLY)
Patient: Bertha Monte  : 1995     Primary Care Provider: Halina Quezada APRN     Requesting Provider: As above           History     Chief Complaint: Low back and right lower extremity pain with sensory alteration.     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  Typically the buttock and thigh are involved.  Earlier on she had more pain more distally.  As of late she started to get some pain in her left hip as well.  She has some numbness in her right leg.  She denies bowel or bladder dysfunction.  Her pain is worse with activity such as bending or twisting.  She has done physical therapy.  She has taken ibuprofen and Flexeril.  Her symptoms remain burdensome.     Review of Systems   Constitutional: Negative for activity change, appetite change, chills, diaphoresis, fatigue, fever and unexpected weight change.   HENT: Negative for congestion, dental problem, drooling, ear discharge, ear pain, facial swelling, hearing loss, mouth sores, nosebleeds, postnasal drip, rhinorrhea, sinus pressure, sinus pain, sneezing, sore throat, tinnitus, trouble swallowing and voice change.    Eyes: Negative for photophobia, pain, discharge, redness, itching and visual disturbance.   Respiratory: Negative for apnea, cough, choking, chest tightness, shortness of breath, wheezing and stridor.    Cardiovascular: Negative for chest pain, palpitations and leg swelling.   Gastrointestinal: Negative for abdominal distention, abdominal pain, anal bleeding, blood in stool, constipation, diarrhea, nausea, rectal pain and vomiting.   Endocrine: Negative for cold intolerance, heat intolerance, polydipsia, polyphagia and polyuria.   Genitourinary: Negative for decreased urine volume, difficulty urinating, dyspareunia, dysuria, enuresis, flank pain, frequency, genital sores, hematuria, menstrual problem, pelvic  pain, urgency, vaginal bleeding, vaginal discharge and vaginal pain.   Musculoskeletal: Positive for back pain, myalgias, neck pain and neck stiffness. Negative for arthralgias, gait problem and joint swelling.   Skin: Negative for color change, pallor, rash and wound.   Allergic/Immunologic: Negative for environmental allergies, food allergies and immunocompromised state.   Neurological: Positive for numbness and headaches. Negative for dizziness, tremors, seizures, syncope, facial asymmetry, speech difficulty, weakness and light-headedness.   Hematological: Negative for adenopathy. Does not bruise/bleed easily.   Psychiatric/Behavioral: Negative for agitation, behavioral problems, confusion, decreased concentration, dysphoric mood, hallucinations, self-injury, sleep disturbance and suicidal ideas. The patient is not nervous/anxious and is not hyperactive.          The patient's past medical history, past surgical history, family history, and social history have been reviewed at length in the electronic medical record.  Past Medical History:   Diagnosis Date   • Allergic 15 years    egg food allergy not severe   • Anemia    • Anxiety 6 months ago   • GERD (gastroesophageal reflux disease)    • Hernia 2018   • Hydradenitis supp    • Hypertension     states no longer high; no medication required   • Low back pain    • Seasonal allergies      Past Surgical History:   Procedure Laterality Date   • ADENOIDECTOMY  2006   • ENDOSCOPY  02/20/2019    Dr. Feliciano- 1 cm hiatal hernia, mild esophagitis, bile aspirate taken for crystal analysis and negative. No other path available.   • ENDOSCOPY N/A 3/24/2023    Procedure: ESOPHAGOGASTRODUODENOSCOPY WITH BIOPSY ;  Surgeon: Garo Limon MD;  Location: [facility] ENDOSCOPY;  Service: Gastroenterology;  Laterality: N/A;   • KNEE SURGERY Left     ACL & meniscus repair   • OTOPLASTY Bilateral 2001   • SKIN BIOPSY     • TONSILLECTOMY  2006     Family History   Problem Relation  "Age of Onset   • No Known Problems Mother    • Hypertension Father      Social History     Socioeconomic History   • Marital status:    Tobacco Use   • Smoking status: Never   • Smokeless tobacco: Never   Vaping Use   • Vaping Use: Never used   Substance and Sexual Activity   • Alcohol use: Not Currently   • Drug use: No   • Sexual activity: Defer           Allergies   Allergen Reactions   • Eggs Or Egg-Derived Products Other (See Comments)     Sneezing       Current Outpatient Medications on File Prior to Visit   Medication Sig Dispense Refill   • Adalimumab (Humira Pen) 40 MG/0.4ML Pen-injector Kit 40 mg 1 (One) Time Per Week.     • clindamycin (CLINDAGEL) 1 % gel Apply  topically to the appropriate area as directed See Admin Instructions. Apply topically to the affected area twice daily     • Diclofenac Sodium (VOLTAREN) 1 % gel gel Apply 4 g topically to the appropriate area as directed 4 (Four) Times a Day As Needed (musculoskeletal pain). 100 g 0   • famotidine (Pepcid) 40 MG tablet Take 1 tablet by mouth Every Night. 90 tablet 1   • lansoprazole (PREVACID) 30 MG capsule TAKE 1 CAPSULE BY MOUTH EVERY DAY 90 capsule 1   • Loratadine 10 MG capsule Claritin     • Prenatal Vit-Fe Fumarate-FA (PRENATAL 1+1 PO) Prenatal     • sertraline (Zoloft) 50 MG tablet Take 1 tablet by mouth Daily. 90 tablet 3     No current facility-administered medications on file prior to visit.            Physical Exam:   Temp 98.2 °F (36.8 °C)   Ht 167.6 cm (66\")   Wt 90.3 kg (199 lb)   BMI 32.12 kg/m²   CONSTITUTIONAL: Patient is well-nourished, pleasant and appears stated age.  MUSCULOSKELETAL:  Straight leg raising is positive on the right at 30 degrees.  Carlos's Sign is negative.  ROM in the low back is normal.  Tenderness in the back to palpation is not observed.  NEUROLOGICAL:  Orientation, memory, attention span, language function, and cognition have been examined and are intact.  Strength is intact in the lower " extremities to direct testing.  Muscle tone is normal throughout.  Station and gait are normal.  Sensation is intact to light touch testing throughout.  Deep tendon reflexes are 1+ and symmetrical.  Coordination is intact.        Medical Decision Making     Data Review:   (All imaging studies were personally reviewed unless stated otherwise)  MRI of the lumbar spine dated 4/7/2023 demonstrates degenerative disc disease at L5-S1 with disc base narrowing.  There is a huge central disc herniation that severely compromising the spinal canal.     Diagnosis:   L5-S1 disc herniation with right S1 radiculopathy.     Treatment Options:   Given her ongoing symptoms and the sheer size of the disc herniation I have recommended right L5-S1 discectomy.  The nature of the procedure as well as the potential risks, complications, limitations, and alternatives to the procedure were discussed at length with the patient and the patient has agreed to proceed with surgery.          Diagnosis Plan   1. Lumbar disc herniation with radiculopathy          2. DDD (degenerative disc disease), lumbar

## 2023-05-18 ENCOUNTER — PRE-ADMISSION TESTING (OUTPATIENT)
Dept: PREADMISSION TESTING | Facility: HOSPITAL | Age: 28
End: 2023-05-18
Payer: COMMERCIAL

## 2023-05-18 VITALS — BODY MASS INDEX: 32.19 KG/M2 | WEIGHT: 200.29 LBS | HEIGHT: 66 IN

## 2023-05-18 DIAGNOSIS — M51.26 HNP (HERNIATED NUCLEUS PULPOSUS), LUMBAR: ICD-10-CM

## 2023-05-18 LAB
DEPRECATED RDW RBC AUTO: 44.1 FL (ref 37–54)
ERYTHROCYTE [DISTWIDTH] IN BLOOD BY AUTOMATED COUNT: 13 % (ref 12.3–15.4)
HBA1C MFR BLD: 5 % (ref 4.8–5.6)
HCT VFR BLD AUTO: 38 % (ref 34–46.6)
HGB BLD-MCNC: 12.4 G/DL (ref 12–15.9)
MCH RBC QN AUTO: 30.2 PG (ref 26.6–33)
MCHC RBC AUTO-ENTMCNC: 32.6 G/DL (ref 31.5–35.7)
MCV RBC AUTO: 92.5 FL (ref 79–97)
PLATELET # BLD AUTO: 250 10*3/MM3 (ref 140–450)
PMV BLD AUTO: 9.9 FL (ref 6–12)
POTASSIUM SERPL-SCNC: 3.9 MMOL/L (ref 3.5–5.2)
RBC # BLD AUTO: 4.11 10*6/MM3 (ref 3.77–5.28)
WBC NRBC COR # BLD: 6.3 10*3/MM3 (ref 3.4–10.8)

## 2023-05-18 PROCEDURE — 87081 CULTURE SCREEN ONLY: CPT

## 2023-05-18 PROCEDURE — 93005 ELECTROCARDIOGRAM TRACING: CPT

## 2023-05-18 PROCEDURE — 36415 COLL VENOUS BLD VENIPUNCTURE: CPT

## 2023-05-18 PROCEDURE — 84132 ASSAY OF SERUM POTASSIUM: CPT

## 2023-05-18 PROCEDURE — 83036 HEMOGLOBIN GLYCOSYLATED A1C: CPT

## 2023-05-18 PROCEDURE — 85027 COMPLETE CBC AUTOMATED: CPT

## 2023-05-18 NOTE — PAT
An arrival time for procedure was not provided during PAT visit. If patient had any questions or concerns about their arrival time, they were instructed to contact their surgeon/physician.  Additionally, if the patient referred to an arrival time that was acquired from their my chart account, patient was encouraged to verify that time with their surgeon/physician. Arrival times are NOT provided in Pre Admission Testing Department.    Patient viewed general PAT education video as instructed in their preoperative information received from their surgeon.  Patient stated the general PAT education video was viewed in its entirety and survey completed.  Copies of PAT general education handouts (Incentive Spirometry, Meds to Beds Program, Patient Belongings, Pre-op skin preparation instructions, Blood Glucose testing, Visitor policy, Surgery FAQ, Code H) distributed to patient if not printed. Education related to the PAT pass and skin preparation for surgery (if applicable) completed in PAT as a reinforcement to PAT education video. Patient instructed to return PAT pass provided today as well as completed skin preparation sheet (if applicable) on the day of procedure.     Additionally if patient had not viewed video yet but intended to view it at home or in our waiting area, then referred them to the handout with QR code/link provided during PAT visit.  Instructed patient to complete survey after viewing the video in its entirety.  Encouraged patient/family to read PAT general education handouts thoroughly and notify PAT staff with any questions or concerns. Patient verbalized understanding of all information and priority content.    Bactroban (if prescribed) and Chlorhexidine Prescription prescribed by physician before PAT visit.  Verified with patient that medication(s) were picked up from their pharmacy.  Written instructions given to patient during PAT visit.  Patient/family also instructed to complete skin prep  checklist and return the checklist on the day of surgery to preoperative staff.  Patient/family verbalized understanding.    Patient to apply Chlorhexadine wipes  to surgical area (as instructed) the night before procedure and the AM of procedure. Wipes provided.    Patient instructed to drink 20 ounces of Gatorade and it needs to be completed 1 hour (for Main OR patients) or 2 hours (scheduled  section & [facility] patients) before given arrival time for procedure (NO RED Gatorade)    Patient verbalized understanding.    EKG obtained.

## 2023-05-19 LAB — MRSA SPEC QL CULT: NORMAL

## 2023-05-20 LAB
QT INTERVAL: 370 MS
QTC INTERVAL: 399 MS

## 2023-05-24 ENCOUNTER — ANESTHESIA EVENT (OUTPATIENT)
Dept: PERIOP | Facility: HOSPITAL | Age: 28
DRG: 519 | End: 2023-05-24
Payer: COMMERCIAL

## 2023-05-24 RX ORDER — SODIUM CHLORIDE 0.9 % (FLUSH) 0.9 %
10 SYRINGE (ML) INJECTION EVERY 12 HOURS SCHEDULED
Status: CANCELLED | OUTPATIENT
Start: 2023-05-24

## 2023-05-24 RX ORDER — SODIUM CHLORIDE 9 MG/ML
40 INJECTION, SOLUTION INTRAVENOUS AS NEEDED
Status: CANCELLED | OUTPATIENT
Start: 2023-05-24

## 2023-05-24 RX ORDER — SODIUM CHLORIDE 0.9 % (FLUSH) 0.9 %
10 SYRINGE (ML) INJECTION AS NEEDED
Status: CANCELLED | OUTPATIENT
Start: 2023-05-24

## 2023-05-25 ENCOUNTER — HOSPITAL ENCOUNTER (INPATIENT)
Facility: HOSPITAL | Age: 28
LOS: 1 days | Discharge: HOME | DRG: 519 | End: 2023-05-31
Attending: NEUROLOGICAL SURGERY | Admitting: NEUROLOGICAL SURGERY
Payer: COMMERCIAL

## 2023-05-25 ENCOUNTER — APPOINTMENT (OUTPATIENT)
Dept: GENERAL RADIOLOGY | Facility: HOSPITAL | Age: 28
DRG: 519 | End: 2023-05-25
Payer: COMMERCIAL

## 2023-05-25 ENCOUNTER — ANESTHESIA (OUTPATIENT)
Dept: PERIOP | Facility: HOSPITAL | Age: 28
DRG: 519 | End: 2023-05-25
Payer: COMMERCIAL

## 2023-05-25 DIAGNOSIS — M51.26 HNP (HERNIATED NUCLEUS PULPOSUS), LUMBAR: ICD-10-CM

## 2023-05-25 PROCEDURE — C1889 IMPLANT/INSERT DEVICE, NOC: HCPCS | Performed by: NEUROLOGICAL SURGERY

## 2023-05-25 PROCEDURE — 25010000002 DEXAMETHASONE PER 1 MG

## 2023-05-25 PROCEDURE — 81025 URINE PREGNANCY TEST: CPT | Performed by: NEUROLOGICAL SURGERY

## 2023-05-25 PROCEDURE — 25010000002 FENTANYL CITRATE (PF) 100 MCG/2ML SOLUTION

## 2023-05-25 PROCEDURE — 25010000002 SUGAMMADEX 200 MG/2ML SOLUTION

## 2023-05-25 PROCEDURE — 25010000002 VANCOMYCIN 10 G RECONSTITUTED SOLUTION: Performed by: NEUROLOGICAL SURGERY

## 2023-05-25 PROCEDURE — 25010000002 PROPOFOL 10 MG/ML EMULSION

## 2023-05-25 PROCEDURE — 25010000002 ONDANSETRON PER 1 MG

## 2023-05-25 PROCEDURE — 76000 FLUOROSCOPY <1 HR PHYS/QHP: CPT

## 2023-05-25 PROCEDURE — 63047 LAM FACETEC & FORAMOT LUMBAR: CPT

## 2023-05-25 PROCEDURE — 63047 LAM FACETEC & FORAMOT LUMBAR: CPT | Performed by: NEUROLOGICAL SURGERY

## 2023-05-25 DEVICE — FLOSEAL HEMOSTATIC MATRIX, 10ML
Type: IMPLANTABLE DEVICE | Site: SPINE LUMBAR | Status: FUNCTIONAL
Brand: FLOSEAL HEMOSTATIC MATRIX

## 2023-05-25 DEVICE — DURAGEN® PLUS DURAL REGENERATION MATRIX, 1 IN X 3 IN (2.5 CM X 7.5 CM)
Type: IMPLANTABLE DEVICE | Site: SPINE LUMBAR | Status: FUNCTIONAL
Brand: DURAGEN® PLUS

## 2023-05-25 DEVICE — SEAL DURL ADHERUS/AUTOSPRAY/ET HYDROGEL DS EXT/TP/170MM: Type: IMPLANTABLE DEVICE | Site: SPINE LUMBAR | Status: FUNCTIONAL

## 2023-05-25 DEVICE — HEMOST ABS SURGIFOAM SZ100 8X12 10MM: Type: IMPLANTABLE DEVICE | Site: SPINE LUMBAR | Status: FUNCTIONAL

## 2023-05-25 RX ORDER — AMOXICILLIN 250 MG
2 CAPSULE ORAL NIGHTLY PRN
Status: DISCONTINUED | OUTPATIENT
Start: 2023-05-25 | End: 2023-05-31 | Stop reason: HOSPADM

## 2023-05-25 RX ORDER — CEFAZOLIN SODIUM 2 G/100ML
2 INJECTION, SOLUTION INTRAVENOUS ONCE
Status: DISCONTINUED | OUTPATIENT
Start: 2023-05-25 | End: 2023-05-25

## 2023-05-25 RX ORDER — DIPHENHYDRAMINE HCL 25 MG
25 CAPSULE ORAL NIGHTLY PRN
Status: DISCONTINUED | OUTPATIENT
Start: 2023-05-25 | End: 2023-05-31 | Stop reason: HOSPADM

## 2023-05-25 RX ORDER — ONDANSETRON 4 MG/1
4 TABLET, FILM COATED ORAL EVERY 6 HOURS PRN
Status: DISCONTINUED | OUTPATIENT
Start: 2023-05-25 | End: 2023-05-31 | Stop reason: HOSPADM

## 2023-05-25 RX ORDER — SODIUM CHLORIDE 0.9 % (FLUSH) 0.9 %
3 SYRINGE (ML) INJECTION EVERY 12 HOURS SCHEDULED
Status: DISCONTINUED | OUTPATIENT
Start: 2023-05-25 | End: 2023-05-31 | Stop reason: HOSPADM

## 2023-05-25 RX ORDER — NALOXONE HCL 0.4 MG/ML
0.4 VIAL (ML) INJECTION
Status: DISCONTINUED | OUTPATIENT
Start: 2023-05-25 | End: 2023-05-31 | Stop reason: HOSPADM

## 2023-05-25 RX ORDER — MORPHINE SULFATE 4 MG/ML
4 INJECTION, SOLUTION INTRAMUSCULAR; INTRAVENOUS EVERY 4 HOURS PRN
Status: DISCONTINUED | OUTPATIENT
Start: 2023-05-25 | End: 2023-05-31 | Stop reason: HOSPADM

## 2023-05-25 RX ORDER — SODIUM CHLORIDE, SODIUM LACTATE, POTASSIUM CHLORIDE, CALCIUM CHLORIDE 600; 310; 30; 20 MG/100ML; MG/100ML; MG/100ML; MG/100ML
90 INJECTION, SOLUTION INTRAVENOUS CONTINUOUS
Status: DISCONTINUED | OUTPATIENT
Start: 2023-05-25 | End: 2023-05-26

## 2023-05-25 RX ORDER — BISACODYL 10 MG
10 SUPPOSITORY, RECTAL RECTAL DAILY PRN
Status: DISCONTINUED | OUTPATIENT
Start: 2023-05-25 | End: 2023-05-31 | Stop reason: HOSPADM

## 2023-05-25 RX ORDER — PANTOPRAZOLE SODIUM 40 MG/1
40 TABLET, DELAYED RELEASE ORAL
Status: DISCONTINUED | OUTPATIENT
Start: 2023-05-26 | End: 2023-05-31 | Stop reason: HOSPADM

## 2023-05-25 RX ORDER — EPHEDRINE SULFATE 50 MG/ML
INJECTION INTRAVENOUS AS NEEDED
Status: DISCONTINUED | OUTPATIENT
Start: 2023-05-25 | End: 2023-05-25 | Stop reason: SURG

## 2023-05-25 RX ORDER — ROCURONIUM BROMIDE 10 MG/ML
INJECTION, SOLUTION INTRAVENOUS AS NEEDED
Status: DISCONTINUED | OUTPATIENT
Start: 2023-05-25 | End: 2023-05-25 | Stop reason: SURG

## 2023-05-25 RX ORDER — FAMOTIDINE 10 MG/ML
20 INJECTION, SOLUTION INTRAVENOUS ONCE
Status: DISCONTINUED | OUTPATIENT
Start: 2023-05-25 | End: 2023-05-25 | Stop reason: HOSPADM

## 2023-05-25 RX ORDER — MAGNESIUM HYDROXIDE 1200 MG/15ML
LIQUID ORAL AS NEEDED
Status: DISCONTINUED | OUTPATIENT
Start: 2023-05-25 | End: 2023-05-25 | Stop reason: HOSPADM

## 2023-05-25 RX ORDER — PROMETHAZINE HYDROCHLORIDE 12.5 MG/1
12.5 TABLET ORAL EVERY 6 HOURS PRN
Status: DISCONTINUED | OUTPATIENT
Start: 2023-05-25 | End: 2023-05-31 | Stop reason: HOSPADM

## 2023-05-25 RX ORDER — GLYCOPYRROLATE 0.2 MG/ML
INJECTION INTRAMUSCULAR; INTRAVENOUS AS NEEDED
Status: DISCONTINUED | OUTPATIENT
Start: 2023-05-25 | End: 2023-05-25 | Stop reason: SURG

## 2023-05-25 RX ORDER — PROPOFOL 10 MG/ML
VIAL (ML) INTRAVENOUS AS NEEDED
Status: DISCONTINUED | OUTPATIENT
Start: 2023-05-25 | End: 2023-05-25 | Stop reason: SURG

## 2023-05-25 RX ORDER — SODIUM CHLORIDE 9 MG/ML
40 INJECTION, SOLUTION INTRAVENOUS AS NEEDED
Status: DISCONTINUED | OUTPATIENT
Start: 2023-05-25 | End: 2023-05-25 | Stop reason: HOSPADM

## 2023-05-25 RX ORDER — IBUPROFEN 600 MG/1
600 TABLET ORAL EVERY 8 HOURS SCHEDULED
Status: DISCONTINUED | OUTPATIENT
Start: 2023-05-25 | End: 2023-05-31 | Stop reason: HOSPADM

## 2023-05-25 RX ORDER — LIDOCAINE HYDROCHLORIDE 10 MG/ML
0.5 INJECTION, SOLUTION EPIDURAL; INFILTRATION; INTRACAUDAL; PERINEURAL ONCE AS NEEDED
Status: COMPLETED | OUTPATIENT
Start: 2023-05-25 | End: 2023-05-25

## 2023-05-25 RX ORDER — MORPHINE SULFATE 2 MG/ML
2 INJECTION, SOLUTION INTRAMUSCULAR; INTRAVENOUS EVERY 4 HOURS PRN
Status: DISCONTINUED | OUTPATIENT
Start: 2023-05-25 | End: 2023-05-31 | Stop reason: HOSPADM

## 2023-05-25 RX ORDER — ONDANSETRON 2 MG/ML
4 INJECTION INTRAMUSCULAR; INTRAVENOUS EVERY 6 HOURS PRN
Status: DISCONTINUED | OUTPATIENT
Start: 2023-05-25 | End: 2023-05-31 | Stop reason: HOSPADM

## 2023-05-25 RX ORDER — SODIUM CHLORIDE 0.9 % (FLUSH) 0.9 %
10 SYRINGE (ML) INJECTION AS NEEDED
Status: DISCONTINUED | OUTPATIENT
Start: 2023-05-25 | End: 2023-05-31 | Stop reason: HOSPADM

## 2023-05-25 RX ORDER — SODIUM CHLORIDE 9 MG/ML
40 INJECTION, SOLUTION INTRAVENOUS AS NEEDED
Status: DISCONTINUED | OUTPATIENT
Start: 2023-05-25 | End: 2023-05-31 | Stop reason: HOSPADM

## 2023-05-25 RX ORDER — LIDOCAINE HYDROCHLORIDE 10 MG/ML
INJECTION, SOLUTION EPIDURAL; INFILTRATION; INTRACAUDAL; PERINEURAL AS NEEDED
Status: DISCONTINUED | OUTPATIENT
Start: 2023-05-25 | End: 2023-05-25 | Stop reason: SURG

## 2023-05-25 RX ORDER — SODIUM CHLORIDE, SODIUM LACTATE, POTASSIUM CHLORIDE, CALCIUM CHLORIDE 600; 310; 30; 20 MG/100ML; MG/100ML; MG/100ML; MG/100ML
9 INJECTION, SOLUTION INTRAVENOUS CONTINUOUS
Status: DISCONTINUED | OUTPATIENT
Start: 2023-05-25 | End: 2023-05-26

## 2023-05-25 RX ORDER — DEXAMETHASONE SODIUM PHOSPHATE 4 MG/ML
INJECTION, SOLUTION INTRA-ARTICULAR; INTRALESIONAL; INTRAMUSCULAR; INTRAVENOUS; SOFT TISSUE AS NEEDED
Status: DISCONTINUED | OUTPATIENT
Start: 2023-05-25 | End: 2023-05-25 | Stop reason: SURG

## 2023-05-25 RX ORDER — PROMETHAZINE HYDROCHLORIDE 12.5 MG/1
12.5 SUPPOSITORY RECTAL EVERY 6 HOURS PRN
Status: DISCONTINUED | OUTPATIENT
Start: 2023-05-25 | End: 2023-05-31 | Stop reason: HOSPADM

## 2023-05-25 RX ORDER — FENTANYL CITRATE 50 UG/ML
50 INJECTION, SOLUTION INTRAMUSCULAR; INTRAVENOUS
Status: DISCONTINUED | OUTPATIENT
Start: 2023-05-25 | End: 2023-05-25 | Stop reason: HOSPADM

## 2023-05-25 RX ORDER — BUPIVACAINE HYDROCHLORIDE AND EPINEPHRINE 2.5; 5 MG/ML; UG/ML
INJECTION, SOLUTION EPIDURAL; INFILTRATION; INTRACAUDAL; PERINEURAL AS NEEDED
Status: DISCONTINUED | OUTPATIENT
Start: 2023-05-25 | End: 2023-05-25 | Stop reason: HOSPADM

## 2023-05-25 RX ORDER — OXYCODONE AND ACETAMINOPHEN 7.5; 325 MG/1; MG/1
1 TABLET ORAL EVERY 4 HOURS PRN
Status: DISCONTINUED | OUTPATIENT
Start: 2023-05-25 | End: 2023-05-31 | Stop reason: HOSPADM

## 2023-05-25 RX ORDER — MIDAZOLAM HYDROCHLORIDE 1 MG/ML
1 INJECTION INTRAMUSCULAR; INTRAVENOUS
Status: DISCONTINUED | OUTPATIENT
Start: 2023-05-25 | End: 2023-05-25 | Stop reason: SDUPTHER

## 2023-05-25 RX ORDER — SODIUM CHLORIDE 0.9 % (FLUSH) 0.9 %
10 SYRINGE (ML) INJECTION EVERY 12 HOURS SCHEDULED
Status: DISCONTINUED | OUTPATIENT
Start: 2023-05-25 | End: 2023-05-25 | Stop reason: HOSPADM

## 2023-05-25 RX ORDER — LIDOCAINE HYDROCHLORIDE 10 MG/ML
0.5 INJECTION, SOLUTION EPIDURAL; INFILTRATION; INTRACAUDAL; PERINEURAL ONCE AS NEEDED
Status: DISCONTINUED | OUTPATIENT
Start: 2023-05-25 | End: 2023-05-25 | Stop reason: SDUPTHER

## 2023-05-25 RX ORDER — FAMOTIDINE 20 MG/1
20 TABLET, FILM COATED ORAL
Status: DISCONTINUED | OUTPATIENT
Start: 2023-05-25 | End: 2023-05-25 | Stop reason: SDUPTHER

## 2023-05-25 RX ORDER — FENTANYL CITRATE 50 UG/ML
INJECTION, SOLUTION INTRAMUSCULAR; INTRAVENOUS AS NEEDED
Status: DISCONTINUED | OUTPATIENT
Start: 2023-05-25 | End: 2023-05-25 | Stop reason: SURG

## 2023-05-25 RX ORDER — ONDANSETRON 2 MG/ML
INJECTION INTRAMUSCULAR; INTRAVENOUS AS NEEDED
Status: DISCONTINUED | OUTPATIENT
Start: 2023-05-25 | End: 2023-05-25 | Stop reason: SURG

## 2023-05-25 RX ORDER — SODIUM CHLORIDE, SODIUM LACTATE, POTASSIUM CHLORIDE, CALCIUM CHLORIDE 600; 310; 30; 20 MG/100ML; MG/100ML; MG/100ML; MG/100ML
9 INJECTION, SOLUTION INTRAVENOUS CONTINUOUS
Status: DISCONTINUED | OUTPATIENT
Start: 2023-05-25 | End: 2023-05-25 | Stop reason: SDUPTHER

## 2023-05-25 RX ORDER — ACETAMINOPHEN 325 MG/1
650 TABLET ORAL EVERY 4 HOURS PRN
Status: DISCONTINUED | OUTPATIENT
Start: 2023-05-25 | End: 2023-05-31 | Stop reason: HOSPADM

## 2023-05-25 RX ORDER — FAMOTIDINE 20 MG/1
40 TABLET, FILM COATED ORAL NIGHTLY
Status: DISCONTINUED | OUTPATIENT
Start: 2023-05-25 | End: 2023-05-31 | Stop reason: HOSPADM

## 2023-05-25 RX ORDER — SODIUM CHLORIDE 0.9 % (FLUSH) 0.9 %
10 SYRINGE (ML) INJECTION AS NEEDED
Status: DISCONTINUED | OUTPATIENT
Start: 2023-05-25 | End: 2023-05-25 | Stop reason: HOSPADM

## 2023-05-25 RX ORDER — FAMOTIDINE 20 MG/1
20 TABLET, FILM COATED ORAL ONCE
Status: COMPLETED | OUTPATIENT
Start: 2023-05-25 | End: 2023-05-25

## 2023-05-25 RX ORDER — MIDAZOLAM HYDROCHLORIDE 1 MG/ML
1 INJECTION INTRAMUSCULAR; INTRAVENOUS
Status: DISCONTINUED | OUTPATIENT
Start: 2023-05-25 | End: 2023-05-25 | Stop reason: HOSPADM

## 2023-05-25 RX ORDER — DEXMEDETOMIDINE HYDROCHLORIDE 100 UG/ML
INJECTION, SOLUTION INTRAVENOUS AS NEEDED
Status: DISCONTINUED | OUTPATIENT
Start: 2023-05-25 | End: 2023-05-25 | Stop reason: SURG

## 2023-05-25 RX ORDER — CETIRIZINE HYDROCHLORIDE 10 MG/1
10 TABLET ORAL DAILY
Status: DISCONTINUED | OUTPATIENT
Start: 2023-05-25 | End: 2023-05-31 | Stop reason: HOSPADM

## 2023-05-25 RX ADMIN — SERTRALINE 50 MG: 50 TABLET, FILM COATED ORAL at 21:28

## 2023-05-25 RX ADMIN — FENTANYL CITRATE 100 MCG: 50 INJECTION, SOLUTION INTRAMUSCULAR; INTRAVENOUS at 15:06

## 2023-05-25 RX ADMIN — IBUPROFEN 600 MG: 600 TABLET ORAL at 21:28

## 2023-05-25 RX ADMIN — SODIUM CHLORIDE, POTASSIUM CHLORIDE, SODIUM LACTATE AND CALCIUM CHLORIDE 90 ML/HR: 600; 310; 30; 20 INJECTION, SOLUTION INTRAVENOUS at 22:27

## 2023-05-25 RX ADMIN — DEXMEDETOMIDINE HYDROCHLORIDE 4 MCG: 100 INJECTION, SOLUTION INTRAVENOUS at 15:11

## 2023-05-25 RX ADMIN — DEXMEDETOMIDINE HYDROCHLORIDE 8 MCG: 100 INJECTION, SOLUTION INTRAVENOUS at 15:06

## 2023-05-25 RX ADMIN — ROCURONIUM BROMIDE 50 MG: 10 SOLUTION INTRAVENOUS at 15:06

## 2023-05-25 RX ADMIN — DEXAMETHASONE SODIUM PHOSPHATE 4 MG: 4 INJECTION, SOLUTION INTRAMUSCULAR; INTRAVENOUS at 15:11

## 2023-05-25 RX ADMIN — SODIUM CHLORIDE, POTASSIUM CHLORIDE, SODIUM LACTATE AND CALCIUM CHLORIDE 9 ML/HR: 600; 310; 30; 20 INJECTION, SOLUTION INTRAVENOUS at 13:35

## 2023-05-25 RX ADMIN — VANCOMYCIN HYDROCHLORIDE 1250 MG: 10 INJECTION, POWDER, LYOPHILIZED, FOR SOLUTION INTRAVENOUS at 14:56

## 2023-05-25 RX ADMIN — OXYCODONE HYDROCHLORIDE AND ACETAMINOPHEN 1 TABLET: 7.5; 325 TABLET ORAL at 23:17

## 2023-05-25 RX ADMIN — DEXMEDETOMIDINE HYDROCHLORIDE 8 MCG: 100 INJECTION, SOLUTION INTRAVENOUS at 15:23

## 2023-05-25 RX ADMIN — ROCURONIUM BROMIDE 10 MG: 10 SOLUTION INTRAVENOUS at 15:52

## 2023-05-25 RX ADMIN — FAMOTIDINE 20 MG: 20 TABLET ORAL at 13:40

## 2023-05-25 RX ADMIN — LIDOCAINE HYDROCHLORIDE 50 MG: 10 INJECTION, SOLUTION EPIDURAL; INFILTRATION; INTRACAUDAL; PERINEURAL at 15:06

## 2023-05-25 RX ADMIN — ONDANSETRON 4 MG: 2 INJECTION INTRAMUSCULAR; INTRAVENOUS at 15:12

## 2023-05-25 RX ADMIN — GLYCOPYRROLATE 0.1 MCG: 0.4 INJECTION INTRAMUSCULAR; INTRAVENOUS at 15:43

## 2023-05-25 RX ADMIN — MUPIROCIN: 20 OINTMENT TOPICAL at 23:18

## 2023-05-25 RX ADMIN — EPHEDRINE SULFATE 10 MG: 50 INJECTION INTRAVENOUS at 15:43

## 2023-05-25 RX ADMIN — SUGAMMADEX 200 MG: 100 INJECTION, SOLUTION INTRAVENOUS at 16:34

## 2023-05-25 RX ADMIN — CETIRIZINE HYDROCHLORIDE 10 MG: 10 TABLET, FILM COATED ORAL at 21:27

## 2023-05-25 RX ADMIN — LIDOCAINE HYDROCHLORIDE 0.5 ML: 10 INJECTION, SOLUTION EPIDURAL; INFILTRATION; INTRACAUDAL; PERINEURAL at 13:35

## 2023-05-25 RX ADMIN — PROPOFOL 200 MG: 10 INJECTION, EMULSION INTRAVENOUS at 15:06

## 2023-05-25 RX ADMIN — FAMOTIDINE 40 MG: 20 TABLET ORAL at 21:28

## 2023-05-25 RX ADMIN — GLYCOPYRROLATE 0.1 MCG: 0.4 INJECTION INTRAMUSCULAR; INTRAVENOUS at 15:38

## 2023-05-25 NOTE — ANESTHESIA POSTPROCEDURE EVALUATION
Patient: Bertha Monte    Procedure Summary     Date: 05/25/23 Room / Location:  [facility] /  [facility]    Anesthesia Start: 1503 Anesthesia Stop: 1656    Procedure: LUMBAR FORAMINOTOMY RIGHT L5-S1 (Right: Spine Lumbar) Diagnosis:       HNP (herniated nucleus pulposus), lumbar      (HNP (herniated nucleus pulposus), lumbar [M51.26])    Surgeons: Daniel Garibay MD Provider: Chepe Winter MD    Anesthesia Type: general ASA Status: 2          Anesthesia Type: general    Vitals  Vitals Value Taken Time   /76 05/25/23 1655   Temp 97 °F (36.1 °C) 05/25/23 1655   Pulse 96 05/25/23 1655   Resp 14 05/25/23 1655   SpO2 100 % 05/25/23 1655           Post Anesthesia Care and Evaluation    Patient location during evaluation: PACU  Patient participation: complete - patient participated  Level of consciousness: awake and alert  Pain score: 0  Pain management: adequate    Airway patency: patent  Anesthetic complications: No anesthetic complications  PONV Status: none  Cardiovascular status: hemodynamically stable and acceptable  Respiratory status: nonlabored ventilation, acceptable and nasal cannula  Hydration status: acceptable    Comments: Pt arrived to PACU with no issues during transport. Pt placed directly to monitors. Vital signs are within parameters. Pt maintaining ventilation spontaneously. No changes to dental. Report given to PACU and all question and concerns were addressed as well as the plan of care.

## 2023-05-25 NOTE — ANESTHESIA PREPROCEDURE EVALUATION
Anesthesia Evaluation     Patient summary reviewed and Nursing notes reviewed   NPO Solid Status: > 8 hours  NPO Liquid Status: > 8 hours           Airway   Mallampati: II  TM distance: >3 FB  Neck ROM: full  No difficulty expected  Dental - normal exam     Pulmonary     breath sounds clear to auscultation  Cardiovascular     Rhythm: regular  Rate: normal        Neuro/Psych  GI/Hepatic/Renal/Endo      Musculoskeletal     Abdominal    Substance History      OB/GYN          Other                        Anesthesia Plan    ASA 2     general     intravenous induction     Anesthetic plan, risks, benefits, and alternatives have been provided, discussed and informed consent has been obtained with: patient.        CODE STATUS:

## 2023-05-25 NOTE — PLAN OF CARE
Problem: Adult Inpatient Plan of Care  Goal: Plan of Care Review  Outcome: Ongoing, Progressing  Flowsheets (Taken 5/25/2023 1906)  Progress: no change  Plan of Care Reviewed With: patient  Outcome Evaluation: Patient arrived from PACU. VSS, RA, A&Ox4. HOB flat, can roll side to side. Barraza in place, good UOP. Coverderm to back CDI. No c/o pain. Bedrest. Continue to monitor.  Goal: Patient-Specific Goal (Individualized)  Outcome: Ongoing, Progressing  Goal: Absence of Hospital-Acquired Illness or Injury  Outcome: Ongoing, Progressing  Intervention: Identify and Manage Fall Risk  Recent Flowsheet Documentation  Taken 5/25/2023 1810 by Nellie Mendoza, RN  Safety Promotion/Fall Prevention:   activity supervised   assistive device/personal items within reach   fall prevention program maintained   gait belt   clutter free environment maintained   toileting scheduled   safety round/check completed   room organization consistent   nonskid shoes/slippers when out of bed  Intervention: Prevent Skin Injury  Recent Flowsheet Documentation  Taken 5/25/2023 1810 by Nellie Mendoza, RN  Body Position: supine  Intervention: Prevent and Manage VTE (Venous Thromboembolism) Risk  Recent Flowsheet Documentation  Taken 5/25/2023 1810 by Nellie Mendoza RN  VTE Prevention/Management:   bilateral   sequential compression devices on  Intervention: Prevent Infection  Recent Flowsheet Documentation  Taken 5/25/2023 1810 by Nellie Mendoza RN  Infection Prevention: environmental surveillance performed  Goal: Optimal Comfort and Wellbeing  Outcome: Ongoing, Progressing  Intervention: Provide Person-Centered Care  Recent Flowsheet Documentation  Taken 5/25/2023 1810 by Nellie Mendoza RN  Trust Relationship/Rapport:   choices provided   care explained   questions encouraged   reassurance provided   questions answered   thoughts/feelings acknowledged  Goal: Readiness for Transition of Care  Outcome: Ongoing,  Progressing  Intervention: Mutually Develop Transition Plan  Recent Flowsheet Documentation  Taken 5/25/2023 1823 by eNllie Mendoza, RN  Transportation Anticipated: family or friend will provide  Patient/Family Anticipated Services at Transition: none  Patient/Family Anticipates Transition to: home with family     Problem: Skin Injury Risk Increased  Goal: Skin Health and Integrity  Outcome: Ongoing, Progressing  Intervention: Optimize Skin Protection  Recent Flowsheet Documentation  Taken 5/25/2023 1810 by Nellie Mendoza, RN  Head of Bed (HOB) Positioning: HOB flat     Problem: Fall Injury Risk  Goal: Absence of Fall and Fall-Related Injury  Outcome: Ongoing, Progressing  Intervention: Promote Injury-Free Environment  Recent Flowsheet Documentation  Taken 5/25/2023 1810 by Nellie Mendoza, RN  Safety Promotion/Fall Prevention:   activity supervised   assistive device/personal items within reach   fall prevention program maintained   gait belt   clutter free environment maintained   toileting scheduled   safety round/check completed   room organization consistent   nonskid shoes/slippers when out of bed   Goal Outcome Evaluation:  Plan of Care Reviewed With: patient        Progress: no change  Outcome Evaluation: Patient arrived from PACU. VSS, RA, A&Ox4. HOB flat, can roll side to side. Barraza in place, good UOP. Coverderm to back CDI. No c/o pain. Bedrest. Continue to monitor.

## 2023-05-25 NOTE — INTERVAL H&P NOTE
"[facility] Pre-op    Full history and physical note from office is attached.    BP (!) 149/107 (BP Location: Right arm, Patient Position: Lying)   Pulse 95   Temp 97.3 °F (36.3 °C) (Temporal)   Resp 16   Ht 167.6 cm (66\")   Wt 90.7 kg (200 lb)   LMP 05/23/2023 (Exact Date)   SpO2 98%   BMI 32.28 kg/m²     Immunizations:  Influenza:  No  Pneumococcal:  No  Tetanus:  UTD  Covid : x3      LAB Results:  Lab Results   Component Value Date    WBC 6.30 05/18/2023    HGB 12.4 05/18/2023    HCT 38.0 05/18/2023    MCV 92.5 05/18/2023     05/18/2023    NEUTROABS 3.50 05/16/2019    GLUCOSE 83 03/23/2022    BUN 7 03/23/2022    CREATININE 0.75 03/23/2022    EGFRIFNONA 125 05/16/2019     03/23/2022    K 3.9 05/18/2023     03/23/2022    CO2 23.1 03/23/2022    CALCIUM 9.5 03/23/2022    ALBUMIN 4.30 03/23/2022    AST 13 03/23/2022    ALT 18 03/23/2022    BILITOT 0.4 03/23/2022 4/7/23 lumbar MRI:  FINDINGS: There is advanced disc space narrowing evident at the L5-S1  level. Vertebrae are of normal height. No malalignment is seen.     L1-2: No disc bulge or protrusion.     L2-3: No disc bulge or protrusion.     L3-4: No disc bulge or protrusion.     L4-5: No disc bulge or protrusion.     L5-S1: Large midline and right paracentral disc extrusion is present.  There is high-grade compromise of the spinal canal, best seen on image  #29 of series 5. There is mild bilateral neural foraminal narrowing.     IMPRESSION:  Large midline and right paracentral disc extrusion L5-S1  with high-grade spinal canal compromise. Please correlate with any  specific radicular symptoms.    Cancer Staging (if applicable)  Cancer Patient: __ yes __no __unknown__N/A; If yes, clinical stage T:__ N:__M:__, stage group or __N/A      Impression: HNP (herniated nucleus pulposus) lumbar      Plan: LUMBAR DISCECTOMY RIGHT L5-S1      Bertha Stone, VANCE   5/25/2023   13:53 EDT   "

## 2023-05-25 NOTE — ANESTHESIA PROCEDURE NOTES
Airway  Urgency: elective    Date/Time: 5/25/2023 3:09 PM  Airway not difficult    General Information and Staff    Patient location during procedure: OR  CRNA/CAA: Rigoberto Goldsmith CRNA    Indications and Patient Condition  Indications for airway management: airway protection    Preoxygenated: yes  MILS not maintained throughout  Mask difficulty assessment: 1 - vent by mask    Final Airway Details  Final airway type: endotracheal airway      Successful airway: ETT  Cuffed: yes   Successful intubation technique: video laryngoscopy  Endotracheal tube insertion site: oral  Blade: Fung  Blade size: 3  ETT size (mm): 7.0  Cormack-Lehane Classification: grade I - full view of glottis  Placement verified by: chest auscultation and capnometry   Cuff volume (mL): 10  Measured from: lips  ETT/EBT  to lips (cm): 21  Number of attempts at approach: 1  Assessment: lips, teeth, and gum same as pre-op and atraumatic intubation    Additional Comments  Negative epigastric sounds, Breath sound equal bilaterally with symmetric chest rise and fall

## 2023-05-25 NOTE — OP NOTE
NEUROSURGICAL OPERATIVE NOTE        PREOPERATIVE DIAGNOSIS:    L5-S1 disc herniation with radiculopathy      POSTOPERATIVE DIAGNOSIS:  L5-S1 recess and foraminal narrowing with radiculopathy      PROCEDURE:  Right L5-S1 laminotomy, medial facetectomy, foraminotomy      SURGEON:  Daniel Garibay M.D.      ASSISTANT: Dmitry Vanessa PA-C    PAC assisted with:   Suctioning   Retraction   Tying   Suturing   Closing   Application of dressing   Skilled neurosurgery PA assistance was necessary to perform this procedure.        ANESTHESIA:  General      ESTIMATED BLOOD LOSS: Minimal      SPECIMEN: None      DRAINS: None      COMPLICATIONS: Dural rent      CLINICAL NOTE:  The patient is a 27-year-old woman with progressive back and predominantly right lower extremity pain.  Studies demonstrate what is believed to be a large disc herniation centrally at L5-S1.  As such, the patient presents at this time for lumbar discectomy.  The nature of the procedure as well as the potential risks, complications, limitations, and alternatives to the procedure were discussed at length with the patient and the patient has agreed to proceed with surgery.      TECHNICAL NOTE:  The patient was brought to the operating room and while on her cart general endotracheal anesthesia was achieved. She was then turned prone on to the Cloward saddle frame. Special care was ensured to protect pressure points. Her low back was prepared and draped in usual fashion. A localizing radiograph was obtained using a spinal needle and the C-arm. Based on this, after preparation and draping, a 2.5 to 3 cm vertical incision was fashioned overlying the L5-S1 interspace. The underlying tissues were divided with cautery to provide exposure to the posterior spinal elements on the right at L5 and S1. Laminotomy was fashioned and another radiograph confirmed the operative level. The medial aspect of the facet was taken down with drill and punches. The neural foramen was  decompressed. The dural sac was very tight. I extended the laminotomy upward to provide additional access from above. A blunt probe was utilized to probe more medially and a large osteophyte was identified but no actual soft disc herniation. I then worked below the disc space and the axle of the nerve root. Once again, spurring was palpable, but no soft disc herniation. There was a fair amount of inflammatory tissue and some degree of scarring. The CSF leak occurred within the axilla of the nerve root. I spent more time exploring the area looking for soft disc herniation and despite working medially, laterally, inferiorly, and superiorly, no fragment was noted, just the once again rather firm osteophyte across the disc space. DuraGen was place over site where there was CSF aggressing. This could not be primarily repaired. Adherus sealant was placed over that. Paraspinous muscle and fascia were reapproximated in interrupted fashion with 0 Vicryl suture, 0.25% Marcaine was instilled in the paraspinous musculature and subcutaneous tissues. The subcutaneous tissues were closed in layers with 3-0 Vicryl sutures. The skin was closed in running locked fashion with 3-0 Nylon suture. Sterile dressing was applied. The patient was subsequently rolled onto her cart, extubated and taken to the recovery room in satisfactory condition.             Daniel Garibay M.D.

## 2023-05-26 PROCEDURE — 25010000002 VANCOMYCIN 10 G RECONSTITUTED SOLUTION: Performed by: NEUROLOGICAL SURGERY

## 2023-05-26 PROCEDURE — 99024 POSTOP FOLLOW-UP VISIT: CPT | Performed by: NEUROLOGICAL SURGERY

## 2023-05-26 RX ORDER — DOXYCYCLINE 100 MG/1
100 CAPSULE ORAL EVERY 12 HOURS SCHEDULED
Status: DISCONTINUED | OUTPATIENT
Start: 2023-05-26 | End: 2023-05-31 | Stop reason: HOSPADM

## 2023-05-26 RX ADMIN — Medication 3 ML: at 08:50

## 2023-05-26 RX ADMIN — OXYCODONE HYDROCHLORIDE AND ACETAMINOPHEN 1 TABLET: 7.5; 325 TABLET ORAL at 23:21

## 2023-05-26 RX ADMIN — VANCOMYCIN HYDROCHLORIDE 1250 MG: 10 INJECTION, POWDER, LYOPHILIZED, FOR SOLUTION INTRAVENOUS at 01:53

## 2023-05-26 RX ADMIN — IBUPROFEN 600 MG: 600 TABLET ORAL at 13:59

## 2023-05-26 RX ADMIN — DOXYCYCLINE 100 MG: 100 CAPSULE ORAL at 21:19

## 2023-05-26 RX ADMIN — IBUPROFEN 600 MG: 600 TABLET ORAL at 05:05

## 2023-05-26 RX ADMIN — IBUPROFEN 600 MG: 600 TABLET ORAL at 21:19

## 2023-05-26 RX ADMIN — OXYCODONE HYDROCHLORIDE AND ACETAMINOPHEN 1 TABLET: 7.5; 325 TABLET ORAL at 10:30

## 2023-05-26 RX ADMIN — PANTOPRAZOLE SODIUM 40 MG: 40 TABLET, DELAYED RELEASE ORAL at 05:05

## 2023-05-26 RX ADMIN — MUPIROCIN: 20 OINTMENT TOPICAL at 21:20

## 2023-05-26 RX ADMIN — VANCOMYCIN HYDROCHLORIDE 1250 MG: 10 INJECTION, POWDER, LYOPHILIZED, FOR SOLUTION INTRAVENOUS at 13:59

## 2023-05-26 RX ADMIN — OXYCODONE HYDROCHLORIDE AND ACETAMINOPHEN 1 TABLET: 7.5; 325 TABLET ORAL at 16:25

## 2023-05-26 RX ADMIN — DOXYCYCLINE 100 MG: 100 CAPSULE ORAL at 08:49

## 2023-05-26 RX ADMIN — FAMOTIDINE 40 MG: 20 TABLET ORAL at 21:19

## 2023-05-26 RX ADMIN — SERTRALINE 50 MG: 50 TABLET, FILM COATED ORAL at 21:19

## 2023-05-26 RX ADMIN — CETIRIZINE HYDROCHLORIDE 10 MG: 10 TABLET, FILM COATED ORAL at 08:49

## 2023-05-26 NOTE — CASE MANAGEMENT/SOCIAL WORK
Discharge Planning Assessment  [facility]     Patient Name: Bertha Monte  MRN: [id number]  Today's Date: 5/26/2023    Admit Date: 5/25/2023    Plan: Home   Discharge Needs Assessment     Row Name 05/26/23 1728       Living Environment    People in Home spouse    Name(s) of People in Home Bautista(spouse)    Current Living Arrangements home    Potentially Unsafe Housing Conditions none    Primary Care Provided by self    Provides Primary Care For no one    Family Caregiver if Needed spouse       Resource/Environmental Concerns    Resource/Environmental Concerns none       Food Insecurity    Within the past 12 months, you worried that your food would run out before you got the money to buy more. Never true    Within the past 12 months, the food you bought just didn't last and you didn't have money to get more. Never true       Transition Planning    Patient/Family Anticipates Transition to home with family    Patient/Family Anticipated Services at Transition none    Transportation Anticipated family or friend will provide       Discharge Needs Assessment    Readmission Within the Last 30 Days no previous admission in last 30 days    Equipment Currently Used at Home none    Concerns to be Addressed adjustment to diagnosis/illness;discharge planning    Anticipated Changes Related to Illness inability to work    Equipment Needed After Discharge --  to be determined    Provided Post Acute Provider List? N/A    Current Discharge Risk physical impairment               Discharge Plan     Row Name 05/26/23 1729       Plan    Plan Home    Plan Comments chart reviewed. I met with Mrs Monte to initiate d/c planning. Her plan is to return home. She lives with spouse who will be available to assist as needed. Prior to admission she was independent with her own ADLs, she uses no assistive equipment. She has a PCP and insurance coverage for RX meds. She states she had been going to PT twice per week. She is currently on  bedrest. Case mgt will follow for any d/c needs when allowed OOB.    Final Discharge Disposition Code 01 - home or self-care              Continued Care and Services - Admitted Since 5/25/2023    Coordination has not been started for this encounter.       Expected Discharge Date and Time     Expected Discharge Date Expected Discharge Time    May 29, 2023          Demographic Summary     Row Name 05/26/23 1725       General Information    Admission Type observation    Arrived From home    Referral Source admission list    Reason for Consult discharge planning    Preferred Language English    General Information Comments PCP- Halina Quezada       Contact Information    Permission Granted to Share Info With     Contact Information Comments Bautista ( spouse) [phone]               Functional Status     Row Name 05/26/23 1726       Functional Status    Usual Activity Tolerance good       Assessment of Health Literacy    How often do you have someone help you read hospital materials? Never    How often do you have problems learning about your medical condition because of difficulty understanding written information? Never    How often do you have a problem understanding what is told to you about your medical condition? Never    Health Literacy Good       Functional Status, IADL    Medications independent    Meal Preparation independent    Housekeeping independent    Laundry independent    Shopping independent       Mental Status    General Appearance WDL WDL       Mental Status Summary    Recent Changes in Mental Status/Cognitive Functioning no changes       Employment/    Employment Status employed full-time    Shift Worked first shift    Current or Previous Occupation --  works as health     Employment/ Comments insurance- [employer] [place]               Psychosocial    No documentation.                Abuse/Neglect    No documentation.                Legal    No  documentation.                Substance Abuse    No documentation.                Patient Forms    No documentation.                   Sonja C Kellerman, RN

## 2023-05-26 NOTE — PLAN OF CARE
Problem: Adult Inpatient Plan of Care  Goal: Plan of Care Review  Outcome: Ongoing, Progressing  Flowsheets  Taken 5/26/2023 1659  Plan of Care Reviewed With: patient  Outcome Evaluation: Patient had uneventul day. VSS, RA, A&Ox4. HOB flat continued. Barraza in place, good UOP. Coverderm to back CDI. C/o pain, PO pain meds given. Bedrest. Scuds on. Continue to monitor.  Taken 5/25/2023 1906  Progress: no change  Goal: Patient-Specific Goal (Individualized)  Outcome: Ongoing, Progressing  Goal: Absence of Hospital-Acquired Illness or Injury  Outcome: Ongoing, Progressing  Intervention: Identify and Manage Fall Risk  Recent Flowsheet Documentation  Taken 5/26/2023 1630 by Nellie Mendoza, RN  Safety Promotion/Fall Prevention:   activity supervised   assistive device/personal items within reach   clutter free environment maintained   fall prevention program maintained   gait belt   toileting scheduled   room organization consistent   safety round/check completed   nonskid shoes/slippers when out of bed  Taken 5/26/2023 1400 by Nellie Mendoza, RN  Safety Promotion/Fall Prevention:   activity supervised   clutter free environment maintained   assistive device/personal items within reach   fall prevention program maintained   gait belt   toileting scheduled   safety round/check completed   room organization consistent   nonskid shoes/slippers when out of bed  Taken 5/26/2023 1200 by Nellie Mendoza, RN  Safety Promotion/Fall Prevention:   activity supervised   assistive device/personal items within reach   clutter free environment maintained   fall prevention program maintained   gait belt   toileting scheduled   safety round/check completed   room organization consistent   nonskid shoes/slippers when out of bed  Taken 5/26/2023 1030 by Nellie Mendoza, RN  Safety Promotion/Fall Prevention:   activity supervised   assistive device/personal items within reach   clutter free environment maintained   fall prevention  program maintained   gait belt   toileting scheduled   safety round/check completed   room organization consistent   nonskid shoes/slippers when out of bed  Taken 5/26/2023 0855 by Nellie Mendoza RN  Safety Promotion/Fall Prevention:   activity supervised   assistive device/personal items within reach   clutter free environment maintained   fall prevention program maintained   gait belt   toileting scheduled   safety round/check completed   room organization consistent   nonskid shoes/slippers when out of bed  Intervention: Prevent Skin Injury  Recent Flowsheet Documentation  Taken 5/26/2023 1630 by Nellie Mendoza RN  Body Position: supine  Taken 5/26/2023 1400 by Nellie Mendoza RN  Body Position: supine  Taken 5/26/2023 1200 by Nellie Mendoza RN  Body Position: supine  Taken 5/26/2023 1030 by Nellie Mendoza RN  Body Position: supine  Taken 5/26/2023 0855 by Nellie Mendoza RN  Body Position: supine  Intervention: Prevent and Manage VTE (Venous Thromboembolism) Risk  Recent Flowsheet Documentation  Taken 5/26/2023 1630 by Nellie Mendoza RN  VTE Prevention/Management:   bilateral   sequential compression devices off  Taken 5/26/2023 1400 by Nellie Mendoza RN  VTE Prevention/Management:   bilateral   sequential compression devices off  Taken 5/26/2023 1200 by Nellie Mendoza RN  Activity Management: bedrest  VTE Prevention/Management:   bilateral   sequential compression devices off  Taken 5/26/2023 1030 by Nellie Mendoza RN  Activity Management: bedrest  VTE Prevention/Management:   bilateral   sequential compression devices on  Taken 5/26/2023 0855 by Nellie Mendoza RN  Activity Management: bedrest  VTE Prevention/Management:   bilateral   sequential compression devices on  Intervention: Prevent Infection  Recent Flowsheet Documentation  Taken 5/26/2023 1630 by Nellie Mendoza RN  Infection Prevention: environmental surveillance performed  Taken 5/26/2023 1400 by Reji  Nellie GRIMES RN  Infection Prevention: environmental surveillance performed  Taken 5/26/2023 1200 by Nellie Mendoza RN  Infection Prevention: environmental surveillance performed  Taken 5/26/2023 1030 by Nellie Mendoza RN  Infection Prevention: environmental surveillance performed  Taken 5/26/2023 0855 by Nellie Mendoza RN  Infection Prevention: environmental surveillance performed  Goal: Optimal Comfort and Wellbeing  Outcome: Ongoing, Progressing  Intervention: Provide Person-Centered Care  Recent Flowsheet Documentation  Taken 5/26/2023 1630 by Nellie Mendoza RN  Trust Relationship/Rapport: care explained  Taken 5/26/2023 1400 by Nellie Mendoza RN  Trust Relationship/Rapport: care explained  Taken 5/26/2023 1200 by Nellie Mendoza RN  Trust Relationship/Rapport: care explained  Taken 5/26/2023 1030 by Nellie Mendoza RN  Trust Relationship/Rapport: care explained  Taken 5/26/2023 0855 by Nellie Mendoza RN  Trust Relationship/Rapport:   care explained   choices provided   questions encouraged   questions answered   reassurance provided   thoughts/feelings acknowledged  Goal: Readiness for Transition of Care  Outcome: Ongoing, Progressing     Problem: Skin Injury Risk Increased  Goal: Skin Health and Integrity  Outcome: Ongoing, Progressing  Intervention: Optimize Skin Protection  Recent Flowsheet Documentation  Taken 5/26/2023 1630 by Nellie Mendoza RN  Head of Bed (HOB) Positioning: HOB flat  Taken 5/26/2023 1400 by Nellie Mendoza RN  Head of Bed (HOB) Positioning: HOB flat  Taken 5/26/2023 1200 by Nellie Mendoza RN  Head of Bed (HOB) Positioning: HOB flat  Taken 5/26/2023 1030 by Nellie Mendoza RN  Head of Bed (HOB) Positioning: HOB flat  Taken 5/26/2023 0855 by Nellie Mendoza RN  Head of Bed (HOB) Positioning: HOB flat     Problem: Fall Injury Risk  Goal: Absence of Fall and Fall-Related Injury  Outcome: Ongoing, Progressing  Intervention: Identify and Manage  Contributors  Recent Flowsheet Documentation  Taken 5/26/2023 0855 by Nellie Mendoza, RN  Medication Review/Management: medications reviewed  Intervention: Promote Injury-Free Environment  Recent Flowsheet Documentation  Taken 5/26/2023 1630 by Nellie Mendoza, RN  Safety Promotion/Fall Prevention:   activity supervised   assistive device/personal items within reach   clutter free environment maintained   fall prevention program maintained   gait belt   toileting scheduled   room organization consistent   safety round/check completed   nonskid shoes/slippers when out of bed  Taken 5/26/2023 1400 by Nellie Mendoza, RN  Safety Promotion/Fall Prevention:   activity supervised   clutter free environment maintained   assistive device/personal items within reach   fall prevention program maintained   gait belt   toileting scheduled   safety round/check completed   room organization consistent   nonskid shoes/slippers when out of bed  Taken 5/26/2023 1200 by Nellie Mendoza, RN  Safety Promotion/Fall Prevention:   activity supervised   assistive device/personal items within reach   clutter free environment maintained   fall prevention program maintained   gait belt   toileting scheduled   safety round/check completed   room organization consistent   nonskid shoes/slippers when out of bed  Taken 5/26/2023 1030 by Nellie Mendoza, RN  Safety Promotion/Fall Prevention:   activity supervised   assistive device/personal items within reach   clutter free environment maintained   fall prevention program maintained   gait belt   toileting scheduled   safety round/check completed   room organization consistent   nonskid shoes/slippers when out of bed  Taken 5/26/2023 0855 by Nellie Mendoza, RN  Safety Promotion/Fall Prevention:   activity supervised   assistive device/personal items within reach   clutter free environment maintained   fall prevention program maintained   gait belt   toileting scheduled   safety  round/check completed   room organization consistent   nonskid shoes/slippers when out of bed     Problem: Bleeding (Spinal Surgery)  Goal: Absence of Bleeding  Outcome: Ongoing, Progressing     Problem: Bowel Motility Impaired (Spinal Surgery)  Goal: Effective Bowel Elimination  Outcome: Ongoing, Progressing     Problem: Fluid and Electrolyte Imbalance (Spinal Surgery)  Goal: Fluid and Electrolyte Balance  Outcome: Ongoing, Progressing     Problem: Functional Ability Impaired (Spinal Surgery)  Goal: Optimal Functional Ability  Outcome: Ongoing, Progressing  Intervention: Optimize Functional Status  Recent Flowsheet Documentation  Taken 5/26/2023 1630 by Nellie Mendoza RN  Positioning/Transfer Devices:   pillows   in use  Taken 5/26/2023 1400 by Nellie Mendoza RN  Positioning/Transfer Devices:   pillows   in use  Taken 5/26/2023 1200 by Nellie Mendoza RN  Activity Management: bedrest  Positioning/Transfer Devices:   pillows   in use  Taken 5/26/2023 1030 by Nellie Mendoza RN  Activity Management: bedrest  Positioning/Transfer Devices:   pillows   in use  Taken 5/26/2023 0855 by Nellie Mendoza RN  Activity Management: bedrest  Positioning/Transfer Devices:   pillows   in use     Problem: Infection (Spinal Surgery)  Goal: Absence of Infection Signs and Symptoms  Outcome: Ongoing, Progressing  Intervention: Prevent or Manage Infection  Recent Flowsheet Documentation  Taken 5/26/2023 1630 by Nellie Mendoza RN  Infection Prevention: environmental surveillance performed  Taken 5/26/2023 1400 by Nellie Mendoza RN  Infection Prevention: environmental surveillance performed  Taken 5/26/2023 1200 by Nellie Mendoza RN  Infection Prevention: environmental surveillance performed  Taken 5/26/2023 1030 by Nellie Mendoza RN  Infection Prevention: environmental surveillance performed  Taken 5/26/2023 0855 by Nellie Mendoza RN  Infection Prevention: environmental surveillance performed      Problem: Neurologic Impairment (Spinal Surgery)  Goal: Optimal Neurologic Function  Outcome: Ongoing, Progressing  Intervention: Optimize Neurologic Function  Recent Flowsheet Documentation  Taken 5/26/2023 1630 by Nellie Mendoza RN  Body Position: supine  Taken 5/26/2023 1400 by Nellie Mendoza RN  Body Position: supine  Taken 5/26/2023 1200 by Nellie Mendoza RN  Body Position: supine  Taken 5/26/2023 1030 by Nellie Mendoza RN  Body Position: supine  Taken 5/26/2023 0855 by Nellie Mendoza RN  Body Position: supine     Problem: Ongoing Anesthesia Effects (Spinal Surgery)  Goal: Anesthesia/Sedation Recovery  Outcome: Ongoing, Progressing  Intervention: Optimize Anesthesia Recovery  Recent Flowsheet Documentation  Taken 5/26/2023 1630 by Nellie Mendoza RN  Safety Promotion/Fall Prevention:   activity supervised   assistive device/personal items within reach   clutter free environment maintained   fall prevention program maintained   gait belt   toileting scheduled   room organization consistent   safety round/check completed   nonskid shoes/slippers when out of bed  Taken 5/26/2023 1400 by Nellie Mendoza RN  Safety Promotion/Fall Prevention:   activity supervised   clutter free environment maintained   assistive device/personal items within reach   fall prevention program maintained   gait belt   toileting scheduled   safety round/check completed   room organization consistent   nonskid shoes/slippers when out of bed  Taken 5/26/2023 1200 by Nellie Mendoza RN  Safety Promotion/Fall Prevention:   activity supervised   assistive device/personal items within reach   clutter free environment maintained   fall prevention program maintained   gait belt   toileting scheduled   safety round/check completed   room organization consistent   nonskid shoes/slippers when out of bed  Taken 5/26/2023 1030 by Nellie Mendoza, RN  Safety Promotion/Fall Prevention:   activity supervised   assistive  device/personal items within reach   clutter free environment maintained   fall prevention program maintained   gait belt   toileting scheduled   safety round/check completed   room organization consistent   nonskid shoes/slippers when out of bed  Taken 5/26/2023 0855 by Nellie Mendoza RN  Safety Promotion/Fall Prevention:   activity supervised   assistive device/personal items within reach   clutter free environment maintained   fall prevention program maintained   gait belt   toileting scheduled   safety round/check completed   room organization consistent   nonskid shoes/slippers when out of bed     Problem: Pain (Spinal Surgery)  Goal: Acceptable Pain Control  Outcome: Ongoing, Progressing  Intervention: Prevent or Manage Pain  Recent Flowsheet Documentation  Taken 5/26/2023 1630 by Nellie Mendoza RN  Diversional Activities: smartphone  Taken 5/26/2023 1400 by Nellie Mendoza RN  Diversional Activities: smartphone  Taken 5/26/2023 1200 by Nellie Mendoza RN  Diversional Activities: television  Taken 5/26/2023 1030 by Nellie Mendoza RN  Diversional Activities: television  Taken 5/26/2023 0855 by Nellie Mendoza RN  Diversional Activities: television     Problem: Postoperative Nausea and Vomiting (Spinal Surgery)  Goal: Nausea and Vomiting Relief  Outcome: Ongoing, Progressing     Problem: Postoperative Urinary Retention (Spinal Surgery)  Goal: Effective Urinary Elimination  Outcome: Ongoing, Progressing     Problem: Respiratory Compromise (Spinal Surgery)  Goal: Effective Oxygenation and Ventilation  Outcome: Ongoing, Progressing  Intervention: Optimize Oxygenation and Ventilation  Recent Flowsheet Documentation  Taken 5/26/2023 1630 by Nellie Mendoza RN  Head of Bed (HOB) Positioning: HOB flat  Taken 5/26/2023 1400 by Nellie Mendoza RN  Head of Bed (HOB) Positioning: HOB flat  Taken 5/26/2023 1200 by Nellie Mendoza RN  Head of Bed (HOB) Positioning: HOB flat  Taken 5/26/2023 1030  by Nellie Mendoza, RN  Head of Bed ([facility]) Positioning: HOB flat  Taken 5/26/2023 0855 by Nellie Mendoza, RN  Head of Bed ([facility]) Positioning: HOB flat   Goal Outcome Evaluation:  Plan of Care Reviewed With: patient        Progress: no change  Outcome Evaluation: Patient had uneventul day. VSS, RA, A&Ox4. HOB flat continued. Barraza in place, good UOP. Coverderm to back CDI. C/o pain, PO pain meds given. Bedrest. Scuds on. Continue to monitor.

## 2023-05-26 NOTE — PROGRESS NOTES
"NEUROSURGERY PROGRESS NOTE     LOS: 0 days   Patient Care Team:  Halina Quezada APRN as PCP - General (Family Medicine)  Sofya Tay LPCC as Counselor (Behavioral Health)    Chief Complaint: Back and right leg pain.    POD#: 1 Day Post-Op  Procedures:  Right L5-S1 laminotomy and foraminotomy    Interval History:   Patient Complaints: Back soreness and numbness in the right leg that is receded overnight to the bottom of her foot.  Patient Denies: Headache.    Vital Signs: Blood pressure 122/66, pulse 76, temperature 98.1 °F (36.7 °C), temperature source Oral, resp. rate 16, height 167.6 cm (66\"), weight 90.7 kg (200 lb), last menstrual period 05/23/2023, SpO2 96 %, not currently breastfeeding.  Intake/Output:     Intake/Output Summary (Last 24 hours) at 5/26/2023 0719  Last data filed at 5/26/2023 0424  Gross per 24 hour   Intake 1100 ml   Output 3000 ml   Net -1900 ml       Physical Exam:  Patient awakens easily.  She is in good spirits.  Motor function is intact in her feet.  Dry dressing is in place on her incision.       Assessment/Plan:  1.  Suspected L5-S1 disc herniation turned out to be a large osteophyte.  Laminotomy, medial facetectomy, and foraminotomy were pursued.  2.  Intraoperative dural rent, continue bedrest through weekend.  3.  History of MRSA.  On clindamycin gel for cutaneous lesions.  We will add doxycycline.  4.  Disposition: Continue strict bedrest throughout the weekend.  Discussed with patient and .    Daniel Garibay MD  05/26/23  07:19 EDT    "

## 2023-05-27 RX ADMIN — DOXYCYCLINE 100 MG: 100 CAPSULE ORAL at 20:07

## 2023-05-27 RX ADMIN — IBUPROFEN 600 MG: 600 TABLET ORAL at 05:17

## 2023-05-27 RX ADMIN — IBUPROFEN 600 MG: 600 TABLET ORAL at 13:40

## 2023-05-27 RX ADMIN — IBUPROFEN 600 MG: 600 TABLET ORAL at 20:07

## 2023-05-27 RX ADMIN — OXYCODONE HYDROCHLORIDE AND ACETAMINOPHEN 1 TABLET: 7.5; 325 TABLET ORAL at 09:19

## 2023-05-27 RX ADMIN — FAMOTIDINE 40 MG: 20 TABLET ORAL at 20:07

## 2023-05-27 RX ADMIN — MUPIROCIN: 20 OINTMENT TOPICAL at 20:08

## 2023-05-27 RX ADMIN — CETIRIZINE HYDROCHLORIDE 10 MG: 10 TABLET, FILM COATED ORAL at 09:12

## 2023-05-27 RX ADMIN — Medication 3 ML: at 13:41

## 2023-05-27 RX ADMIN — OXYCODONE HYDROCHLORIDE AND ACETAMINOPHEN 1 TABLET: 7.5; 325 TABLET ORAL at 20:07

## 2023-05-27 RX ADMIN — PANTOPRAZOLE SODIUM 40 MG: 40 TABLET, DELAYED RELEASE ORAL at 05:17

## 2023-05-27 RX ADMIN — SERTRALINE 50 MG: 50 TABLET, FILM COATED ORAL at 20:07

## 2023-05-27 RX ADMIN — DOXYCYCLINE 100 MG: 100 CAPSULE ORAL at 09:12

## 2023-05-27 NOTE — PLAN OF CARE
Goal Outcome Evaluation:  Plan of Care Reviewed With: patient        Progress: no change  Outcome Evaluation: Pt continues to keep HOB flat. Barraza to bedside drainage bag. Dressing to back is CDI. Pain managed with oral medication.  at bedside and is attentive to patient. Waiting a discharge plan when ready.

## 2023-05-27 NOTE — PROGRESS NOTES
"    [facility]    NEUROSURGERY PROGRESS NOTE    05/27/23    Chief Complaint: back and right leg pain    Subjective: Stable overnight. Denies headache    2 Days Post-Op    Objective:     /85 (BP Location: Left arm, Patient Position: Lying)   Pulse 82   Temp 97.9 °F (36.6 °C) (Oral)   Resp 18   Ht 167.6 cm (66\")   Wt 90.7 kg (200 lb)   LMP 05/23/2023 (Exact Date)   SpO2 98%   BMI 32.28 kg/m²        Physical Exam  Patient laying flat in bed  She awakens easily  Moves bilateral lower extremities with full strength  Lumbar dressing dry      Intake/Output Summary (Last 24 hours) at 5/27/2023 0836  Last data filed at 5/26/2023 2321  Gross per 24 hour   Intake 835 ml   Output 950 ml   Net -115 ml         Current Facility-Administered Medications:   •  acetaminophen (TYLENOL) tablet 650 mg, 650 mg, Oral, Q4H PRN, Daniel Garibay MD  •  bisacodyl (DULCOLAX) suppository 10 mg, 10 mg, Rectal, Daily PRN, Daniel Garibay MD  •  cetirizine (zyrTEC) tablet 10 mg, 10 mg, Oral, Daily, Daniel Garibay MD, 10 mg at 05/26/23 0849  •  diphenhydrAMINE (BENADRYL) capsule 25 mg, 25 mg, Oral, Nightly PRN, Daniel Garibay MD  •  doxycycline (MONODOX) capsule 100 mg, 100 mg, Oral, Q12H, Daniel Garibay MD, 100 mg at 05/26/23 2119  •  famotidine (PEPCID) tablet 40 mg, 40 mg, Oral, Nightly, Daniel Garibay MD, 40 mg at 05/26/23 2119  •  ibuprofen (ADVIL,MOTRIN) tablet 600 mg, 600 mg, Oral, Q8H, Daniel Garibay MD, 600 mg at 05/27/23 0517  •  morphine injection 2 mg, 2 mg, Intravenous, Q4H PRN **AND** naloxone (NARCAN) injection 0.4 mg, 0.4 mg, Intravenous, Q5 Min PRN, Daniel Garibay MD  •  Morphine sulfate (PF) injection 4 mg, 4 mg, Intravenous, Q4H PRN **AND** naloxone (NARCAN) injection 0.4 mg, 0.4 mg, Intravenous, Q5 Min PRN, Daniel Garibay MD  •  mupirocin (BACTROBAN) 2 % nasal ointment, , Nasal, BID, Daniel Garibay MD, Given at 05/26/23 2120  •  " ondansetron (ZOFRAN) tablet 4 mg, 4 mg, Oral, Q6H PRN **OR** ondansetron (ZOFRAN) injection 4 mg, 4 mg, Intravenous, Q6H PRN, Daniel Garibay MD  •  oxyCODONE-acetaminophen (PERCOCET) 7.5-325 MG per tablet 1 tablet, 1 tablet, Oral, Q4H PRN, Daniel Garibay MD, 1 tablet at 05/26/23 2321  •  pantoprazole (PROTONIX) EC tablet 40 mg, 40 mg, Oral, Q AM, Daniel Garibay MD, 40 mg at 05/27/23 0517  •  promethazine (PHENERGAN) tablet 12.5 mg, 12.5 mg, Oral, Q6H PRN **OR** promethazine (PHENERGAN) suppository 12.5 mg, 12.5 mg, Rectal, Q6H PRN, Daniel Garibay MD  •  sennosides-docusate (PERICOLACE) 8.6-50 MG per tablet 2 tablet, 2 tablet, Oral, Nightly PRN, Daniel Garibay MD  •  sertraline (ZOLOFT) tablet 50 mg, 50 mg, Oral, Nightly, Daniel Garibay MD, 50 mg at 05/26/23 2119  •  sodium chloride 0.9 % flush 10 mL, 10 mL, Intravenous, PRN, Daniel Garibay MD  •  sodium chloride 0.9 % flush 3 mL, 3 mL, Intravenous, Q12H, Daniel Garibay MD, 3 mL at 05/26/23 0850  •  sodium chloride 0.9 % infusion 40 mL, 40 mL, Intravenous, PRN, Daniel Garibay MD    Laboratory Results:                                Brief Urine Lab Results  (Last result in the past 365 days)      Color   Clarity   Blood   Leuk Est   Nitrite   Protein   CREAT   Urine HCG        05/25/23 1343               Negative           Microbiology Results (last 10 days)     Procedure Component Value - Date/Time    MRSA Screen Culture (Outpatient) - Swab, Nares [[id number]]  (Normal) Collected: 05/18/23 1058    Lab Status: Final result Specimen: Swab from Nares Updated: 05/19/23 1300     MRSA Screen Cx No Methicillin Resistant Staphylococcus aureus isolated    Narrative:      The negative predictive value of this diagnostic test is high and should only be used to consider de-escalating anti-MRSA therapy. A positive result may indicate colonization with MRSA and must be correlated clinically.                     Diagnostic Imaging: I personally reviewed  and interpreted the new imaging    Assessment and plan: This is a 28 yo female who is 2 days s/p right L5-S1 laminotomy and foraminotomy with subsequent dural rent during surgery. She is doing well this morning with no complaints of headache. She has good strength in her legs. Continue strict bed rest, HOB flat.        Any copied data from previous notes included in the (1) HPI, (2) PE, (3) MDM and/or Assessment and Plan has been reviewed and accurate as of 05/27/23      Victorina Spaulding PA-C  05/27/23  08:36 EDT

## 2023-05-28 PROCEDURE — 99024 POSTOP FOLLOW-UP VISIT: CPT | Performed by: STUDENT IN AN ORGANIZED HEALTH CARE EDUCATION/TRAINING PROGRAM

## 2023-05-28 RX ADMIN — FAMOTIDINE 40 MG: 20 TABLET ORAL at 21:41

## 2023-05-28 RX ADMIN — DOXYCYCLINE 100 MG: 100 CAPSULE ORAL at 21:41

## 2023-05-28 RX ADMIN — Medication 3 ML: at 21:41

## 2023-05-28 RX ADMIN — Medication 3 ML: at 08:58

## 2023-05-28 RX ADMIN — OXYCODONE HYDROCHLORIDE AND ACETAMINOPHEN 1 TABLET: 7.5; 325 TABLET ORAL at 14:50

## 2023-05-28 RX ADMIN — CETIRIZINE HYDROCHLORIDE 10 MG: 10 TABLET, FILM COATED ORAL at 08:59

## 2023-05-28 RX ADMIN — DOXYCYCLINE 100 MG: 100 CAPSULE ORAL at 08:59

## 2023-05-28 RX ADMIN — IBUPROFEN 600 MG: 600 TABLET ORAL at 05:17

## 2023-05-28 RX ADMIN — SERTRALINE 50 MG: 50 TABLET, FILM COATED ORAL at 21:41

## 2023-05-28 RX ADMIN — PANTOPRAZOLE SODIUM 40 MG: 40 TABLET, DELAYED RELEASE ORAL at 05:18

## 2023-05-28 RX ADMIN — IBUPROFEN 600 MG: 600 TABLET ORAL at 13:31

## 2023-05-28 RX ADMIN — IBUPROFEN 600 MG: 600 TABLET ORAL at 21:41

## 2023-05-28 NOTE — PROGRESS NOTES
"NEUROSURGERY PROGRESS NOTE    Chief Complaint: Lumbar stenosis    Subjective: Stable overnight.  Denies headache.  Pain controlled.    Objective    Vital Signs: Blood pressure 124/79, pulse 78, temperature 98 °F (36.7 °C), temperature source Oral, resp. rate 16, height 167.6 cm (66\"), weight 90.7 kg (200 lb), last menstrual period 05/23/2023, SpO2 97 %, not currently breastfeeding.    Physical Exam  Awake, alert and oriented x 3  Opens eyes spont  Pupils 3 mm rx bilat  Extraocular muscles intact bilaterally  Face symmetric bilaterally  Tongue midline  5/5 in all 4 ext  Dressing dry    Intake/Output:     Intake/Output Summary (Last 24 hours) at 5/28/2023 0900  Last data filed at 5/28/2023 0314  Gross per 24 hour   Intake 917 ml   Output 3125 ml   Net -2208 ml       Current Medications:   Current Facility-Administered Medications:   •  acetaminophen (TYLENOL) tablet 650 mg, 650 mg, Oral, Q4H PRN, Daniel Garibay MD  •  bisacodyl (DULCOLAX) suppository 10 mg, 10 mg, Rectal, Daily PRN, Daniel Garibay MD  •  cetirizine (zyrTEC) tablet 10 mg, 10 mg, Oral, Daily, Daniel Garibay MD, 10 mg at 05/28/23 0859  •  diphenhydrAMINE (BENADRYL) capsule 25 mg, 25 mg, Oral, Nightly PRN, Daniel Garibay MD  •  doxycycline (MONODOX) capsule 100 mg, 100 mg, Oral, Q12H, Daniel Garibay MD, 100 mg at 05/28/23 0859  •  famotidine (PEPCID) tablet 40 mg, 40 mg, Oral, Nightly, Daniel Garibay MD, 40 mg at 05/27/23 2007  •  ibuprofen (ADVIL,MOTRIN) tablet 600 mg, 600 mg, Oral, Q8H, Daniel Garibay MD, 600 mg at 05/28/23 0517  •  morphine injection 2 mg, 2 mg, Intravenous, Q4H PRN **AND** naloxone (NARCAN) injection 0.4 mg, 0.4 mg, Intravenous, Q5 Min PRN, Daniel Garibay MD  •  Morphine sulfate (PF) injection 4 mg, 4 mg, Intravenous, Q4H PRN **AND** naloxone (NARCAN) injection 0.4 mg, 0.4 mg, Intravenous, Q5 Min PRN, Daniel Garibay MD  •  mupirocin (BACTROBAN) 2 % nasal ointment, , Nasal, BID, Daniel Garibay MD, Given at " 05/27/23 2008  •  ondansetron (ZOFRAN) tablet 4 mg, 4 mg, Oral, Q6H PRN **OR** ondansetron (ZOFRAN) injection 4 mg, 4 mg, Intravenous, Q6H PRN, Daniel Garibay MD  •  oxyCODONE-acetaminophen (PERCOCET) 7.5-325 MG per tablet 1 tablet, 1 tablet, Oral, Q4H PRN, Daniel Garibay MD, 1 tablet at 05/27/23 2007  •  pantoprazole (PROTONIX) EC tablet 40 mg, 40 mg, Oral, Q AM, Daniel Garibay MD, 40 mg at 05/28/23 0518  •  promethazine (PHENERGAN) tablet 12.5 mg, 12.5 mg, Oral, Q6H PRN **OR** promethazine (PHENERGAN) suppository 12.5 mg, 12.5 mg, Rectal, Q6H PRN, Daniel Garibay MD  •  sennosides-docusate (PERICOLACE) 8.6-50 MG per tablet 2 tablet, 2 tablet, Oral, Nightly PRN, Daniel Garibay MD  •  sertraline (ZOLOFT) tablet 50 mg, 50 mg, Oral, Nightly, Daniel Garibay MD, 50 mg at 05/27/23 2007  •  sodium chloride 0.9 % flush 10 mL, 10 mL, Intravenous, PRN, Daniel Garibay MD  •  sodium chloride 0.9 % flush 3 mL, 3 mL, Intravenous, Q12H, Daniel Garibay MD, 3 mL at 05/28/23 0858  •  sodium chloride 0.9 % infusion 40 mL, 40 mL, Intravenous, PRN, Daniel Garibay MD     Laboratory Results:                               Brief Urine Lab Results  (Last result in the past 365 days)      Color   Clarity   Blood   Leuk Est   Nitrite   Protein   CREAT   Urine HCG        05/25/23 1343               Negative           Microbiology Results (last 10 days)     Procedure Component Value - Date/Time    MRSA Screen Culture (Outpatient) - Swab, Nares [[id number]]  (Normal) Collected: 05/18/23 1058    Lab Status: Final result Specimen: Swab from Nares Updated: 05/19/23 1300     MRSA Screen Cx No Methicillin Resistant Staphylococcus aureus isolated    Narrative:      The negative predictive value of this diagnostic test is high and should only be used to consider de-escalating anti-MRSA therapy. A positive result may indicate colonization with MRSA and must be correlated clinically.             Diagnostic Imaging: I reviewed and  independently interpreted the new imaging.     Assessment/Plan:  This is a 26 yo female s/p right L5-S1 laminotomy and foraminotomy with subsequent dural rent on 5/25. She is doing well this morning with no complaints of headache. She has good strength in her legs. Continue strict bed rest, HOB flat.    Any copied data from previous notes included in the (1) History of Present Illness, (2) Physical Examination and (3) Medical Decision Making and/or Assessment and Plan has been reviewed and is accurate as of 05/28/23      Faraz Longoria MD  05/28/23  09:00 EDT

## 2023-05-28 NOTE — PLAN OF CARE
Problem: Adult Inpatient Plan of Care  Goal: Plan of Care Review  Outcome: Ongoing, Progressing  Flowsheets (Taken 5/28/2023 0334)  Plan of Care Reviewed With: patient  Goal: Patient-Specific Goal (Individualized)  Outcome: Ongoing, Progressing  Goal: Absence of Hospital-Acquired Illness or Injury  Outcome: Ongoing, Progressing  Intervention: Identify and Manage Fall Risk  Recent Flowsheet Documentation  Taken 5/28/2023 0200 by Karissa Mcmahan RN  Safety Promotion/Fall Prevention:   activity supervised   assistive device/personal items within reach   clutter free environment maintained   fall prevention program maintained   room organization consistent   safety round/check completed  Taken 5/28/2023 0000 by Karissa Mcmahan RN  Safety Promotion/Fall Prevention:   activity supervised   assistive device/personal items within reach   clutter free environment maintained   fall prevention program maintained   nonskid shoes/slippers when out of bed   room organization consistent   safety round/check completed  Taken 5/27/2023 2200 by Karissa Mcmahan RN  Safety Promotion/Fall Prevention:   activity supervised   assistive device/personal items within reach   clutter free environment maintained   fall prevention program maintained   nonskid shoes/slippers when out of bed   safety round/check completed   room organization consistent  Taken 5/27/2023 2000 by Karissa Mcmahan RN  Safety Promotion/Fall Prevention:   activity supervised   assistive device/personal items within reach   clutter free environment maintained   fall prevention program maintained   room organization consistent   safety round/check completed  Intervention: Prevent Skin Injury  Recent Flowsheet Documentation  Taken 5/27/2023 2000 by Karissa Mcmahan RN  Body Position: supine  Skin Protection:   adhesive use limited   incontinence pads utilized   tubing/devices free from skin contact  Intervention: Prevent and Manage VTE (Venous Thromboembolism)  Risk  Recent Flowsheet Documentation  Taken 5/27/2023 2000 by Karissa Mcmahan RN  Activity Management: bedrest  VTE Prevention/Management:   bilateral   sequential compression devices on  Intervention: Prevent Infection  Recent Flowsheet Documentation  Taken 5/28/2023 0200 by Karissa Mcmahan RN  Infection Prevention:   environmental surveillance performed   hand hygiene promoted   rest/sleep promoted   single patient room provided  Taken 5/28/2023 0000 by Karissa Mcmahan RN  Infection Prevention:   environmental surveillance performed   hand hygiene promoted   rest/sleep promoted   single patient room provided  Taken 5/27/2023 2200 by Karissa Mcmahan RN  Infection Prevention:   environmental surveillance performed   hand hygiene promoted   single patient room provided  Taken 5/27/2023 2000 by Karissa Mcmahan RN  Infection Prevention:   environmental surveillance performed   hand hygiene promoted   single patient room provided  Goal: Optimal Comfort and Wellbeing  Outcome: Ongoing, Progressing  Intervention: Monitor Pain and Promote Comfort  Recent Flowsheet Documentation  Taken 5/27/2023 2000 by Karissa Mcmahan RN  Pain Management Interventions: see MAR  Intervention: Provide Person-Centered Care  Recent Flowsheet Documentation  Taken 5/27/2023 2000 by Karissa Mcmahan RN  Trust Relationship/Rapport: care explained  Goal: Readiness for Transition of Care  Outcome: Ongoing, Progressing     Problem: Skin Injury Risk Increased  Goal: Skin Health and Integrity  Outcome: Ongoing, Progressing  Intervention: Optimize Skin Protection  Recent Flowsheet Documentation  Taken 5/27/2023 2000 by Karissa Mcmahan RN  Pressure Reduction Techniques: frequent weight shift encouraged  Head of Bed (HOB) Positioning: HOB flat  Pressure Reduction Devices: pressure-redistributing mattress utilized  Skin Protection:   adhesive use limited   incontinence pads utilized   tubing/devices free from skin contact     Problem: Fall  Injury Risk  Goal: Absence of Fall and Fall-Related Injury  Outcome: Ongoing, Progressing  Intervention: Identify and Manage Contributors  Recent Flowsheet Documentation  Taken 5/28/2023 0200 by Karissa Mcmahan RN  Medication Review/Management: medications reviewed  Taken 5/28/2023 0000 by Karissa Mcmahan RN  Medication Review/Management: medications reviewed  Taken 5/27/2023 2200 by Karissa Mcmahan RN  Medication Review/Management: medications reviewed  Taken 5/27/2023 2000 by Karissa Mcmahan RN  Medication Review/Management: medications reviewed  Intervention: Promote Injury-Free Environment  Recent Flowsheet Documentation  Taken 5/28/2023 0200 by Karissa Mcmahan RN  Safety Promotion/Fall Prevention:   activity supervised   assistive device/personal items within reach   clutter free environment maintained   fall prevention program maintained   room organization consistent   safety round/check completed  Taken 5/28/2023 0000 by Karissa Mcmahan RN  Safety Promotion/Fall Prevention:   activity supervised   assistive device/personal items within reach   clutter free environment maintained   fall prevention program maintained   nonskid shoes/slippers when out of bed   room organization consistent   safety round/check completed  Taken 5/27/2023 2200 by Karissa Mcmahan RN  Safety Promotion/Fall Prevention:   activity supervised   assistive device/personal items within reach   clutter free environment maintained   fall prevention program maintained   nonskid shoes/slippers when out of bed   safety round/check completed   room organization consistent  Taken 5/27/2023 2000 by Karissa Mcmahan RN  Safety Promotion/Fall Prevention:   activity supervised   assistive device/personal items within reach   clutter free environment maintained   fall prevention program maintained   room organization consistent   safety round/check completed     Problem: Bleeding (Spinal Surgery)  Goal: Absence of Bleeding  Outcome:  Ongoing, Progressing     Problem: Bowel Motility Impaired (Spinal Surgery)  Goal: Effective Bowel Elimination  Outcome: Ongoing, Progressing     Problem: Fluid and Electrolyte Imbalance (Spinal Surgery)  Goal: Fluid and Electrolyte Balance  Outcome: Ongoing, Progressing     Problem: Functional Ability Impaired (Spinal Surgery)  Goal: Optimal Functional Ability  Outcome: Ongoing, Progressing  Intervention: Optimize Functional Status  Recent Flowsheet Documentation  Taken 5/27/2023 2000 by Karissa Mcmahan RN  Activity Management: bedrest  Positioning/Transfer Devices:   pillows   in use     Problem: Infection (Spinal Surgery)  Goal: Absence of Infection Signs and Symptoms  Outcome: Ongoing, Progressing  Intervention: Prevent or Manage Infection  Recent Flowsheet Documentation  Taken 5/28/2023 0200 by Karissa Mcmahan RN  Infection Prevention:   environmental surveillance performed   hand hygiene promoted   rest/sleep promoted   single patient room provided  Taken 5/28/2023 0000 by Karissa Mcmahan RN  Infection Prevention:   environmental surveillance performed   hand hygiene promoted   rest/sleep promoted   single patient room provided  Taken 5/27/2023 2200 by Karissa Mcmahan RN  Infection Prevention:   environmental surveillance performed   hand hygiene promoted   single patient room provided  Taken 5/27/2023 2000 by Karissa Mcmahan RN  Infection Prevention:   environmental surveillance performed   hand hygiene promoted   single patient room provided     Problem: Neurologic Impairment (Spinal Surgery)  Goal: Optimal Neurologic Function  Outcome: Ongoing, Progressing  Intervention: Optimize Neurologic Function  Recent Flowsheet Documentation  Taken 5/27/2023 2000 by Karissa Mcmahan RN  Body Position: supine  Pressure Reduction Devices: pressure-redistributing mattress utilized     Problem: Ongoing Anesthesia Effects (Spinal Surgery)  Goal: Anesthesia/Sedation Recovery  Outcome: Ongoing,  Progressing  Intervention: Optimize Anesthesia Recovery  Recent Flowsheet Documentation  Taken 5/28/2023 0200 by Karissa Mcmahan RN  Safety Promotion/Fall Prevention:   activity supervised   assistive device/personal items within reach   clutter free environment maintained   fall prevention program maintained   room organization consistent   safety round/check completed  Taken 5/28/2023 0000 by Karissa Mcmahan RN  Safety Promotion/Fall Prevention:   activity supervised   assistive device/personal items within reach   clutter free environment maintained   fall prevention program maintained   nonskid shoes/slippers when out of bed   room organization consistent   safety round/check completed  Taken 5/27/2023 2200 by Karissa Mcmahan RN  Safety Promotion/Fall Prevention:   activity supervised   assistive device/personal items within reach   clutter free environment maintained   fall prevention program maintained   nonskid shoes/slippers when out of bed   safety round/check completed   room organization consistent  Taken 5/27/2023 2000 by Karissa Mcmahan RN  Safety Promotion/Fall Prevention:   activity supervised   assistive device/personal items within reach   clutter free environment maintained   fall prevention program maintained   room organization consistent   safety round/check completed     Problem: Pain (Spinal Surgery)  Goal: Acceptable Pain Control  Outcome: Ongoing, Progressing  Intervention: Prevent or Manage Pain  Recent Flowsheet Documentation  Taken 5/27/2023 2000 by Karissa Mcmahan RN  Pain Management Interventions: see MAR  Diversional Activities: television     Problem: Postoperative Nausea and Vomiting (Spinal Surgery)  Goal: Nausea and Vomiting Relief  Outcome: Ongoing, Progressing     Problem: Postoperative Urinary Retention (Spinal Surgery)  Goal: Effective Urinary Elimination  Outcome: Ongoing, Progressing     Problem: Respiratory Compromise (Spinal Surgery)  Goal: Effective Oxygenation  and Ventilation  Outcome: Ongoing, Progressing  Intervention: Optimize Oxygenation and Ventilation  Recent Flowsheet Documentation  Taken 5/27/2023 2000 by Karissa Mcmahan, RN  Head of Bed (HOB) Positioning: HOB flat   Goal Outcome Evaluation:  Plan of Care Reviewed With: patient

## 2023-05-28 NOTE — PLAN OF CARE
Goal Outcome Evaluation:  Plan of Care Reviewed With: patient        Progress: no change  Outcome Evaluation: Tolerates diet well. Spouse at bedside. Barraza to bedside drainage. Pt remains HOB flat. [place] is CDI. SCDs on as ordered. Family visiting today.

## 2023-05-29 PROCEDURE — 25010000002 HEPARIN (PORCINE) PER 1000 UNITS: Performed by: NEUROLOGICAL SURGERY

## 2023-05-29 PROCEDURE — 99024 POSTOP FOLLOW-UP VISIT: CPT | Performed by: NEUROLOGICAL SURGERY

## 2023-05-29 RX ORDER — HEPARIN SODIUM 5000 [USP'U]/ML
5000 INJECTION, SOLUTION INTRAVENOUS; SUBCUTANEOUS EVERY 8 HOURS SCHEDULED
Status: DISCONTINUED | OUTPATIENT
Start: 2023-05-29 | End: 2023-05-31 | Stop reason: HOSPADM

## 2023-05-29 RX ADMIN — Medication 10 ML: at 08:58

## 2023-05-29 RX ADMIN — DOXYCYCLINE 100 MG: 100 CAPSULE ORAL at 08:57

## 2023-05-29 RX ADMIN — DOXYCYCLINE 100 MG: 100 CAPSULE ORAL at 22:02

## 2023-05-29 RX ADMIN — BISACODYL 10 MG: 10 SUPPOSITORY RECTAL at 08:57

## 2023-05-29 RX ADMIN — FAMOTIDINE 40 MG: 20 TABLET ORAL at 22:03

## 2023-05-29 RX ADMIN — CETIRIZINE HYDROCHLORIDE 10 MG: 10 TABLET, FILM COATED ORAL at 08:57

## 2023-05-29 RX ADMIN — HEPARIN SODIUM 5000 UNITS: 5000 INJECTION INTRAVENOUS; SUBCUTANEOUS at 08:57

## 2023-05-29 RX ADMIN — OXYCODONE HYDROCHLORIDE AND ACETAMINOPHEN 1 TABLET: 7.5; 325 TABLET ORAL at 15:48

## 2023-05-29 RX ADMIN — PANTOPRAZOLE SODIUM 40 MG: 40 TABLET, DELAYED RELEASE ORAL at 06:21

## 2023-05-29 RX ADMIN — HEPARIN SODIUM 5000 UNITS: 5000 INJECTION INTRAVENOUS; SUBCUTANEOUS at 13:53

## 2023-05-29 RX ADMIN — IBUPROFEN 600 MG: 600 TABLET ORAL at 13:53

## 2023-05-29 RX ADMIN — HEPARIN SODIUM 5000 UNITS: 5000 INJECTION INTRAVENOUS; SUBCUTANEOUS at 22:02

## 2023-05-29 RX ADMIN — Medication 3 ML: at 22:03

## 2023-05-29 RX ADMIN — SERTRALINE 50 MG: 50 TABLET, FILM COATED ORAL at 22:03

## 2023-05-29 RX ADMIN — IBUPROFEN 600 MG: 600 TABLET ORAL at 06:21

## 2023-05-29 RX ADMIN — IBUPROFEN 600 MG: 600 TABLET ORAL at 22:03

## 2023-05-29 NOTE — PROGRESS NOTES
"NEUROSURGERY PROGRESS NOTE     LOS: 0 days   Patient Care Team:  Halina Quezada APRN as PCP - General (Family Medicine)  Sofya Tay LPCC as Counselor (Behavioral Health)    Chief Complaint: Back and right leg pain.    POD#: 4 Days Post-Op  Procedures:  Right L5-S1 laminotomy and foraminotomy.    Interval History:   Patient Complaints: Mild incisional pain.  She has numbness on the bottom of her right foot.  Patient Denies: Headache.    Vital Signs: Blood pressure 128/90, pulse 79, temperature 98.2 °F (36.8 °C), temperature source Oral, resp. rate 16, height 167.6 cm (66\"), weight 90.7 kg (200 lb), last menstrual period 05/23/2023, SpO2 97 %, not currently breastfeeding.  Intake/Output:     Intake/Output Summary (Last 24 hours) at 5/29/2023 0705  Last data filed at 5/29/2023 0618  Gross per 24 hour   Intake 1215 ml   Output 2200 ml   Net -985 ml       Physical Exam:  The patient awakens easily.  She is in good spirits.  Dry dressing is in place on her incision.  Motor function is intact in her lower extremities.     Assessment/Plan:  1.  Suspected L5-S1 disc herniation turned out to be a large osteophyte.  Laminotomy, medial facetectomy, and foraminotomy were pursued.  2.  Intraoperative dural rent, continue bedrest through weekend.  3.  History of MRSA.  On clindamycin gel for cutaneous lesions.  We will add doxycycline.  4.  Disposition: Continue bedrest today.  Likely up tomorrow and then home soon thereafter.      Daniel Garibay MD  05/29/23  07:05 EDT    "

## 2023-05-29 NOTE — PLAN OF CARE
Goal Outcome Evaluation:  Plan of Care Reviewed With: patient        Progress: no change  Outcome Evaluation: Remains HOB flat. Large BM after bowel meds today. Barraza to bedside drainage. Heparin started sub q today. Dressing is CDI. Tolerates diet without nausea. Pain managed with  oral medicaiton. Spouse at bedside and attentive to the patient. Waiting a discharge plan when ready.

## 2023-05-29 NOTE — PLAN OF CARE
Goal Outcome Evaluation:  Plan of Care Reviewed With: patient A&OX4 VSS Lumbar dressing CDI. Pain less than 4/10 and tolerable. Neuro checks WNL. FC patent. Menstrual cycle noted.HOB remains flat.  at bedside.

## 2023-05-30 PROBLEM — G96.11 DURAL TEAR: Status: ACTIVE | Noted: 2023-05-30

## 2023-05-30 PROCEDURE — 25010000002 HEPARIN (PORCINE) PER 1000 UNITS: Performed by: NEUROLOGICAL SURGERY

## 2023-05-30 PROCEDURE — 00QT0ZZ REPAIR SPINAL MENINGES, OPEN APPROACH: ICD-10-PCS | Performed by: NEUROLOGICAL SURGERY

## 2023-05-30 PROCEDURE — 01NR0ZZ RELEASE SACRAL NERVE, OPEN APPROACH: ICD-10-PCS | Performed by: NEUROLOGICAL SURGERY

## 2023-05-30 PROCEDURE — 97161 PT EVAL LOW COMPLEX 20 MIN: CPT

## 2023-05-30 PROCEDURE — 00U20KZ SUPPLEMENT DURA MATER WITH NONAUTOLOGOUS TISSUE SUBSTITUTE, OPEN APPROACH: ICD-10-PCS | Performed by: NEUROLOGICAL SURGERY

## 2023-05-30 PROCEDURE — 99024 POSTOP FOLLOW-UP VISIT: CPT | Performed by: NEUROLOGICAL SURGERY

## 2023-05-30 PROCEDURE — 97110 THERAPEUTIC EXERCISES: CPT

## 2023-05-30 RX ADMIN — CETIRIZINE HYDROCHLORIDE 10 MG: 10 TABLET, FILM COATED ORAL at 08:15

## 2023-05-30 RX ADMIN — HEPARIN SODIUM 5000 UNITS: 5000 INJECTION INTRAVENOUS; SUBCUTANEOUS at 06:39

## 2023-05-30 RX ADMIN — HEPARIN SODIUM 5000 UNITS: 5000 INJECTION INTRAVENOUS; SUBCUTANEOUS at 14:30

## 2023-05-30 RX ADMIN — IBUPROFEN 600 MG: 600 TABLET ORAL at 14:29

## 2023-05-30 RX ADMIN — HEPARIN SODIUM 5000 UNITS: 5000 INJECTION INTRAVENOUS; SUBCUTANEOUS at 21:23

## 2023-05-30 RX ADMIN — DOXYCYCLINE 100 MG: 100 CAPSULE ORAL at 21:24

## 2023-05-30 RX ADMIN — FAMOTIDINE 40 MG: 20 TABLET ORAL at 21:23

## 2023-05-30 RX ADMIN — IBUPROFEN 600 MG: 600 TABLET ORAL at 21:24

## 2023-05-30 RX ADMIN — PANTOPRAZOLE SODIUM 40 MG: 40 TABLET, DELAYED RELEASE ORAL at 06:39

## 2023-05-30 RX ADMIN — SERTRALINE 50 MG: 50 TABLET, FILM COATED ORAL at 21:24

## 2023-05-30 RX ADMIN — DOXYCYCLINE 100 MG: 100 CAPSULE ORAL at 08:15

## 2023-05-30 RX ADMIN — IBUPROFEN 600 MG: 600 TABLET ORAL at 06:39

## 2023-05-30 RX ADMIN — Medication 3 ML: at 08:16

## 2023-05-30 NOTE — THERAPY EVALUATION
Patient Name: Bertha Monte  : 1995    MRN: [id number]                              Today's Date: 2023       Admit Date: 2023    Visit Dx:     ICD-10-CM ICD-9-CM   1. HNP (herniated nucleus pulposus), lumbar  M51.26 722.10     Patient Active Problem List   Diagnosis   • Hiatal hernia with GERD   • Hidradenitis suppurativa   • Primary hypertension   • History of hiatal hernia   • HNP (herniated nucleus pulposus), lumbar     Past Medical History:   Diagnosis Date   • Anemia    • Anxiety 6 months ago   • GERD (gastroesophageal reflux disease)    • Hernia 2018    Hiatal   • Hydradenitis supp    • Hypertension     states no longer high; no medication required   • Low back pain    • MRSA infection     , under breasts due to HS   • Seasonal allergies      Past Surgical History:   Procedure Laterality Date   • ADENOIDECTOMY     • ENDOSCOPY  2019    Dr. Feliciano- 1 cm hiatal hernia, mild esophagitis, bile aspirate taken for crystal analysis and negative. No other path available.   • ENDOSCOPY N/A 3/24/2023    Procedure: ESOPHAGOGASTRODUODENOSCOPY WITH BIOPSY ;  Surgeon: Garo Limon MD;  Location: [facility];  Service: Gastroenterology;  Laterality: N/A;   • KNEE SURGERY Left     ACL & meniscus repair   • LUMBAR DISCECTOMY Right 2023    Procedure: LUMBAR FORAMINOTOMY RIGHT L5-S1;  Surgeon: Daniel Garibay MD;  Location: [facility] OR;  Service: Neurosurgery;  Laterality: Right;   • OTOPLASTY Bilateral    • SKIN BIOPSY     • TONSILLECTOMY        General Information     Row Name 23 1047          Physical Therapy Time and Intention    Document Type evaluation  -AB     Mode of Treatment physical therapy  -AB     Row Name 23 1047          General Information    Patient Profile Reviewed yes  -AB     Prior Level of Function independent:;all household mobility;community mobility;gait;transfer;bed mobility;ADL's  Pt denies use of AD or recent falls. Attending  OPPT for R sided pain and sensation deficits prior to surgery.  -AB     Existing Precautions/Restrictions fall;spinal;other (see comments)  R sided plantar surface decreased sensation.  -AB     Barriers to Rehab impaired sensation  -AB     Row Name 05/30/23 1047          Living Environment    People in Home spouse  -AB     Row Name 05/30/23 1047          Home Main Entrance    Number of Stairs, Main Entrance one  -AB     Stair Railings, Main Entrance none  -AB     Row Name 05/30/23 1047          Stairs Within Home, Primary    Number of Stairs, Within Home, Primary none  -AB     Row Name 05/30/23 1047          Cognition    Orientation Status (Cognition) oriented x 4  -AB     Row Name 05/30/23 1047          Safety Issues, Functional Mobility    Safety Issues Affecting Function (Mobility) insight into deficits/self-awareness;awareness of need for assistance;safety precaution awareness  -AB     Impairments Affecting Function (Mobility) balance;endurance/activity tolerance;pain;sensation/sensory awareness;range of motion (ROM);strength  -AB     Comment, Safety Issues/Impairments (Mobility) Pt demo's good maintinance of spinal precautions.  -AB           User Key  (r) = Recorded By, (t) = Taken By, (c) = Cosigned By    Initials Name Provider Type    AB Kristy Allred, PT Physical Therapist               Mobility     Row Name 05/30/23 1053          Bed Mobility    Bed Mobility supine-sit;scooting/bridging  -AB     Scooting/Bridging [place] (Bed Mobility) supervision;1 person assist;verbal cues  -AB     Supine-Sit [place] (Bed Mobility) supervision;1 person assist;verbal cues  -AB     Assistive Device (Bed Mobility) head of bed elevated  -AB     Comment, (Bed Mobility) Pt educated on log rolling technique. As pt already sitting at 90 degrees in bed pt able to maintain spinal precautions while advancing BLE's towards and keeping trunk in line with hips.  -AB     Row Name 05/30/23 1053          Transfers    Comment,  (Transfers) Spinal precautions reviewed, pt verbalized understanding. Cues for hand placement and sequencing. Pt with mild instability during initial walk to bathroom secondary to R foot numbness. PT issued SPC and stability greatly improved.  -AB     Row Name 05/30/23 1053          Bed-Chair Transfer    Bed-Chair [place] (Transfers) contact guard;1 person assist;verbal cues  -AB     Assistive Device (Bed-Chair Transfers) cane, straight  -AB     Row Name 05/30/23 1053          Sit-Stand Transfer    Sit-Stand [place] (Transfers) contact guard;1 person assist;verbal cues  -AB     Assistive Device (Sit-Stand Transfers) cane, straight  -AB     Row Name 05/30/23 1053          Gait/Stairs (Locomotion)    [place] Level (Gait) contact guard;1 person assist;verbal cues  -AB     Assistive Device (Gait) cane, straight  -AB     Distance in Feet (Gait) 200  -AB     Deviations/Abnormal Patterns (Gait) bilateral deviations;sanchez decreased;gait speed decreased;stride length decreased;steppage  -AB     Bilateral Gait Deviations heel strike decreased  -AB     [place] Level (Stairs) unable to assess  -AB     Comment, (Gait/Stairs) Pt ambulated 200' with SPC and CGAx1. Cues for sequencing of gait with cane usage. Pt demo's step through gait pattern with decreased stride length and slowed sanchez. Gait mechanics improved with cues. No overt LOB or knee buckling. Further gait limited by fatigue and pain.  -AB           User Key  (r) = Recorded By, (t) = Taken By, (c) = Cosigned By    Initials Name Provider Type    AB Kristy Allred, PT Physical Therapist               Obj/Interventions     Row Name 05/30/23 1100          Range of Motion Comprehensive    General Range of Motion bilateral lower extremity ROM WFL  -AB     Row Name 05/30/23 1100          Strength Comprehensive (MMT)    General Manual Muscle Testing (MMT) Assessment lower extremity strength deficits identified  -AB     Comment, General Manual Muscle  Testing (MMT) Assessment BLE strength grossly 4/5  -AB     Row Name 05/30/23 1100          Motor Skills    Therapeutic Exercise shoulder;hip;knee;ankle  -AB     Row Name 05/30/23 1100          Shoulder (Therapeutic Exercise)    Shoulder (Therapeutic Exercise) AROM (active range of motion)  -AB     Shoulder AROM (Therapeutic Exercise) bilateral;flexion;10 repetitions  -AB     Row Name 05/30/23 1100          Hip (Therapeutic Exercise)    Hip (Therapeutic Exercise) AROM (active range of motion)  Bent knee fall outs x10; abdominal sets x10  -AB     Hip AROM (Therapeutic Exercise) bilateral;external rotation;internal rotation;10 repetitions  -AB     Row Name 05/30/23 1100          Knee (Therapeutic Exercise)    Knee (Therapeutic Exercise) isometric exercises;strengthening exercise  -AB     Knee Isometrics (Therapeutic Exercise) bilateral;gluteal sets;quad sets;10 repetitions  -AB     Knee Strengthening (Therapeutic Exercise) bilateral;heel slides;10 repetitions  -AB     Row Name 05/30/23 1100          Ankle (Therapeutic Exercise)    Ankle (Therapeutic Exercise) AROM (active range of motion)  -AB     Ankle AROM (Therapeutic Exercise) bilateral;dorsiflexion;plantarflexion;10 repetitions  -AB     Row Name 05/30/23 1100          Balance    Balance Assessment sitting static balance;standing dynamic balance;standing static balance;sitting dynamic balance  -AB     Static Sitting Balance standby assist  -AB     Dynamic Sitting Balance standby assist  -AB     Position, Sitting Balance unsupported;sitting edge of bed  -AB     Static Standing Balance contact guard;1-person assist  -AB     Dynamic Standing Balance contact guard;1-person assist;verbal cues  -AB     Position/Device Used, Standing Balance supported;cane, straight  -AB     Balance Interventions sitting;standing;sit to stand;supported;static;dynamic;occupation based/functional task  -AB     Comment, Balance No overt LOB or knee buckling.  -AB     Row Name 05/30/23 1100           Sensory Assessment (Somatosensory)    Sensory Assessment (Somatosensory) right LE  -AB     Right LE Sensory Assessment light touch awareness;impaired  Pt with numbness at plantar surface of R foot.  -AB           User Key  (r) = Recorded By, (t) = Taken By, (c) = Cosigned By    Initials Name Provider Type    Kristy Marquis, PT Physical Therapist               Goals/Plan     Row Name 05/30/23 1108          Bed Mobility Goal 1 (PT)    Activity/Assistive Device (Bed Mobility Goal 1, PT) sit to supine;supine to sit  -AB     [place] Level/Cues Needed (Bed Mobility Goal 1, PT) independent  -AB     Time Frame (Bed Mobility Goal 1, PT) long term goal (LTG);10 days  -AB     Row Name 05/30/23 1108          Transfer Goal 1 (PT)    Activity/Assistive Device (Transfer Goal 1, PT) sit-to-stand/stand-to-sit;bed-to-chair/chair-to-bed;cane, straight  -AB     [place] Level/Cues Needed (Transfer Goal 1, PT) modified independence  -AB     Time Frame (Transfer Goal 1, PT) long term goal (LTG);10 days  -AB     Row Name 05/30/23 1108          Gait Training Goal 1 (PT)    Activity/Assistive Device (Gait Training Goal 1, PT) gait (walking locomotion);assistive device use;cane, straight  -AB     [place] Level (Gait Training Goal 1, PT) modified independence  -AB     Distance (Gait Training Goal 1, PT) 400  -AB     Time Frame (Gait Training Goal 1, PT) long term goal (LTG);10 days  -AB     Row Name 05/30/23 1108          Stairs Goal 1 (PT)    Activity/Assistive Device (Stairs Goal 1, PT) ascending stairs;descending stairs;cane, straight  -AB     [place] Level/Cues Needed (Stairs Goal 1, PT) modified independence  -AB     Number of Stairs (Stairs Goal 1, PT) 1  -AB     Time Frame (Stairs Goal 1, PT) long term goal (LTG);10 days  -AB     Row Name 05/30/23 1108          Patient Education Goal (PT)    Activity (Patient Education Goal, PT) Spinal precautions.  -AB     [place]/Cues/Accuracy (Memory Goal 2, PT)  independent;verbalizes understanding  -AB     Time Frame (Patient Education Goal, PT) long term goal (LTG);10 days  -AB     Row Name 05/30/23 1108          Therapy Assessment/Plan (PT)    Planned Therapy Interventions (PT) balance training;bed mobility training;gait training;home exercise program;postural re-education;patient/family education;ROM (range of motion);stair training;strengthening;stretching;transfer training  -AB           User Key  (r) = Recorded By, (t) = Taken By, (c) = Cosigned By    Initials Name Provider Type    AB Kristy Allred, PT Physical Therapist               Clinical Impression     Row Name 05/30/23 1103          Pain    Pretreatment Pain Rating 5/10  -AB     Posttreatment Pain Rating 5/10  -AB     Pain Location - Side/Orientation Right  -AB     Pain Location lower  -AB     Pain Location - extremity  -AB     Pre/Posttreatment Pain Comment Shooting pain from R hip to knee. Tolerated.  -AB     Pain Intervention(s) Ambulation/increased activity;Elevated;Repositioned;Nursing Notified  -AB     Additional Documentation Pain Scale: Numbers Pre/Post-Treatment (Group)  -AB     Row Name 05/30/23 1103          Plan of Care Review    Plan of Care Reviewed With patient;spouse  -AB     Progress no change  -AB     Outcome Evaluation PT initial eval completed. Pt presents with mild balance deficits, impaired sensation, limited ROM, and decreased strength causing functional mobility below baseline. Spinal precautions reviewed, pt verbalized understanding. Pt ambulated 200' with SPC and CGAx1. SPC issued secondary to balance deficits caused by decreased sensation at plantar aspect of R foot. No LOB or knee buckling. HEP completed. Pt will benefit from further IPPT for addressing deficits. PT rec d/c home with 24/7 assist and OPPT when medically appropriate.  -AB     Row Name 05/30/23 1103          Therapy Assessment/Plan (PT)    Rehab Potential (PT) good, to achieve stated therapy goals  -AB     Criteria  for Skilled Interventions Met (PT) yes;meets criteria;skilled treatment is necessary  -AB     Therapy Frequency (PT) daily  -AB     Row Name 05/30/23 1103          Vital Signs    Pre Systolic BP Rehab 133  -AB     Pre Treatment Diastolic BP 86  -AB     Intra Systolic BP Rehab 138  -AB     Intra Treatment Diastolic BP 95  -AB     Post Systolic BP Rehab 155  -AB     Post Treatment Diastolic    -AB     Pretreatment Heart Rate (beats/min) 99  -AB     Posttreatment Heart Rate (beats/min) 87  -AB     Pre SpO2 (%) 99  -AB     O2 Delivery Pre Treatment room air  -AB     O2 Delivery Intra Treatment room air  -AB     Post SpO2 (%) 97  -AB     O2 Delivery Post Treatment room air  -AB     Pre Patient Position Supine  -AB     Intra Patient Position Standing  -AB     Post Patient Position Sitting  -AB     Row Name 05/30/23 1103          Positioning and Restraints    Pre-Treatment Position in bed  -AB     Post Treatment Position chair  -AB     In Chair notified nsg;reclined;sitting;call light within reach;encouraged to call for assist;with family/caregiver;legs elevated  RN deferred chair alarm  -AB           User Key  (r) = Recorded By, (t) = Taken By, (c) = Cosigned By    Initials Name Provider Type    AB Kristy Allred, PT Physical Therapist               Outcome Measures     Row Name 05/30/23 1109          How much help from another person do you currently need...    Turning from your back to your side while in flat bed without using bedrails? 3  -AB     Moving from lying on back to sitting on the side of a flat bed without bedrails? 3  -AB     Moving to and from a bed to a chair (including a wheelchair)? 3  -AB     Standing up from a chair using your arms (e.g., wheelchair, bedside chair)? 3  -AB     Climbing 3-5 steps with a railing? 3  -AB     To walk in hospital room? 3  -AB     AM-PAC 6 Clicks Score (PT) 18  -AB     Highest level of mobility 6 --> Walked 10 steps or more  -AB     Row Name 05/30/23 1100           Functional Assessment    Outcome Measure Options AM-PAC 6 Clicks Basic Mobility (PT)  -AB           User Key  (r) = Recorded By, (t) = Taken By, (c) = Cosigned By    Initials Name Provider Type    AB Kristy Allred, PT Physical Therapist                             Physical Therapy Education     Title: PT OT SLP Therapies (Done)     Topic: Physical Therapy (Done)     Point: Mobility training (Done)     Learning Progress Summary           Patient Acceptance, E,D, VU,DU by AB at 5/30/2023 1109                   Point: Home exercise program (Done)     Learning Progress Summary           Patient Acceptance, E,D, VU,DU by AB at 5/30/2023 1109                   Point: Body mechanics (Done)     Learning Progress Summary           Patient Acceptance, E,D, VU,DU by AB at 5/30/2023 1109                   Point: Precautions (Done)     Learning Progress Summary           Patient Acceptance, E,D, VU,DU by AB at 5/30/2023 1109                               User Key     Initials Effective Dates Name Provider Type Discipline    AB 09/22/22 -  Kristy Allred PT Physical Therapist PT              PT Recommendation and Plan  Planned Therapy Interventions (PT): balance training, bed mobility training, gait training, home exercise program, postural re-education, patient/family education, ROM (range of motion), stair training, strengthening, stretching, transfer training  Plan of Care Reviewed With: patient, spouse  Progress: no change  Outcome Evaluation: PT initial eval completed. Pt presents with mild balance deficits, impaired sensation, limited ROM, and decreased strength causing functional mobility below baseline. Spinal precautions reviewed, pt verbalized understanding. Pt ambulated 200' with SPC and CGAx1. SPC issued secondary to balance deficits caused by decreased sensation at plantar aspect of R foot. No LOB or knee buckling. HEP completed. Pt will benefit from further IPPT for addressing deficits. PT rec d/c home with 24/7  assist and OPPT when medically appropriate.     Time Calculation:    PT Charges     Row Name 05/30/23 1110             Time Calculation    Start Time 1017  -AB      PT Received On 05/30/23  -AB      PT Goal Re-Cert Due Date 06/09/23  -AB         Timed Charges    27964 - PT Therapeutic Exercise Minutes 10  -AB         Untimed Charges    PT Eval/Re-eval Minutes 46  -AB         Total Minutes    Timed Charges Total Minutes 10  -AB      Untimed Charges Total Minutes 46  -AB       Total Minutes 56  -AB            User Key  (r) = Recorded By, (t) = Taken By, (c) = Cosigned By    Initials Name Provider Type    AB Kristy Allred, PT Physical Therapist              Therapy Charges for Today     Code Description Service Date Service Provider Modifiers Qty    30444095502 HC PT THER PROC EA 15 MIN 5/30/2023 Kristy Allred, PT GP 1    02134678084 HC PT EVAL LOW COMPLEXITY 4 5/30/2023 Kristy Allred, PT GP 1          PT G-Codes  Outcome Measure Options: AM-PAC 6 Clicks Basic Mobility (PT)  AM-PAC 6 Clicks Score (PT): 18  PT Discharge Summary  Anticipated Discharge Disposition (PT): home with 24/7 care, home with outpatient therapy services    Kristy Allred PT  5/30/2023

## 2023-05-30 NOTE — PLAN OF CARE
Goal Outcome Evaluation:  Plan of Care Reviewed With: patient, spouse        Progress: no change  Outcome Evaluation: PT initial eval completed. Pt presents with mild balance deficits, impaired sensation, limited ROM, and decreased strength causing functional mobility below baseline. Spinal precautions reviewed, pt verbalized understanding. Pt ambulated 200' with SPC and CGAx1. SPC issued secondary to balance deficits caused by decreased sensation at plantar aspect of R foot. No LOB or knee buckling. HEP completed. Pt will benefit from further IPPT for addressing deficits. PT rec d/c home with 24/7 assist and OPPT when medically appropriate.

## 2023-05-30 NOTE — PLAN OF CARE
Goal Outcome Evaluation:              Outcome Evaluation: Pt ambulated with PT today and with me to bathroom. Barraza d/c'd this morning with voiding after removal. Cane with ambulation due to decreased sensation in right foot that is the same as prior to surgery.

## 2023-05-30 NOTE — CASE MANAGEMENT/SOCIAL WORK
Continued Stay Note  [facility]     Patient Name: Bertha Monte  MRN: [id number]  Today's Date: 5/30/2023    Admit Date: 5/25/2023    Plan: Home   Discharge Plan     Row Name 05/30/23 1535       Plan    Plan Home    Plan Comments Case mgt f/u. Plan remains to return home when medically ready. Nio d/c needs identified at this time,please advise if any arise    Final Discharge Disposition Code 01 - home or self-care               Discharge Codes    No documentation.               Expected Discharge Date and Time     Expected Discharge Date Expected Discharge Time    May 29, 2023             Sonja C Kellerman, RN     Telephone Encounter by Janneth Murray RN at 08/03/17 02:19 PM     Author:  Janneth Murray RN Service:  (none) Author Type:  Registered Nurse     Filed:  08/03/17 02:19 PM Encounter Date:  8/3/2017 Status:  Signed     :  Janneth Murray RN (Registered Nurse)            Please call the patient to scheduled[KW1.1M] radiofrequency vein ablation to left GSV and SSV[KW1.1C]  No authorization is needed[KW1.1M]            Revision History        User Key Date/Time User Provider Type Action    > KW1.1 08/03/17 02:19 PM Janneth Murray, RN Registered Nurse Sign    C - Copied, M - Manual

## 2023-05-30 NOTE — PROGRESS NOTES
"NEUROSURGERY PROGRESS NOTE     LOS: 0 days   Patient Care Team:  Halina Quezada APRN as PCP - General (Family Medicine)  Sofya Tay LPCC as Counselor (Behavioral Health)    Chief Complaint: Back and right leg pain.    POD#: 5 Days Post-Op  Procedures:  Right L5-S1 laminotomy and foraminotomy.    Interval History:   Patient Complaints: Right plantar foot numbness.  Patient Denies: Headache.    Vital Signs: Blood pressure 126/79, pulse 72, temperature 98.6 °F (37 °C), temperature source Oral, resp. rate 14, height 167.6 cm (66\"), weight 90.7 kg (200 lb), last menstrual period 05/23/2023, SpO2 98 %, not currently breastfeeding.  Intake/Output:     Intake/Output Summary (Last 24 hours) at 5/30/2023 0631  Last data filed at 5/29/2023 1948  Gross per 24 hour   Intake 790 ml   Output 1600 ml   Net -810 ml       Physical Exam:  The patient awakens easily.  She is in good spirits.  Dry dressing is in place on her incision.       Assessment/Plan:  1.  Suspected L5-S1 disc herniation turned out to be a large osteophyte.  Laminotomy, medial facetectomy, and foraminotomy were pursued.  2.  Intraoperative dural rent, continue bedrest through weekend.  3.  History of MRSA.  On clindamycin gel for cutaneous lesions.  We will add doxycycline.  4.  Disposition:  Slowly mobilize patient.  Hopefully home soon.      Daniel Garibay MD  05/30/23  06:31 EDT    "

## 2023-05-30 NOTE — PAYOR COMM NOTE
"Status is inpatient as of 5/30/2023   Ofelia Monte (27 y.o. Female)     Date of Birth   1995    Social Security Number       Address   [address] [place] [address] [address] [place] [address]    Home Phone   [phone]    MRN   [id number]       Denominational   None    Marital Status                               Admission Date   5/25/23    Admission Type   Elective    Admitting Provider   Daniel Garibay MD    Attending Provider   Daniel Garibay MD    Department, Room/Bed   [address], S384/1       Discharge Date       Discharge Disposition       Discharge Destination                               Attending Provider: Daniel Garibay MD    Allergies: Eggs Or Egg-derived Products    Isolation: None   Infection: None   Code Status: CPR    Ht: 167.6 cm (66\")   Wt: 90.7 kg (200 lb)    Admission Cmt: None   Principal Problem: HNP (herniated nucleus pulposus), lumbar [M51.26]                 Active Insurance as of 5/25/2023     Primary Coverage     Payor Plan Insurance Group Employer/Plan Group    [employer]HE[employer] BLUE CROSS AN[employer]M BLUE CROSS BLUE SHIELD PPO [id number]     Payor Plan Address Payor Plan Phone Number Payor Plan Fax Number Effective Dates    [address] [phone]  1/1/2021 - None Entered    [facility] [id number]       Subscriber Name Subscriber Birth Date Member ID       HENRIQUE MONTE 1995 [id number]           Secondary Coverage     Payor Plan Insurance Group Employer/Plan Group    [employer] [place] [employer] [place]ICAID      Payor Plan Address Payor Plan Phone Number Payor Plan Fax Number Effective Dates    [address] [address] [phone]  2/27/2018 - None Entered    [facility] [id number]       Subscriber Name Subscriber Birth Date Member ID       OFELIA MONTE 1995 [id number]                 Emergency Contacts      (Rel.) Home Phone Work Phone Mobile Phone    HENRIQUE MONTE (Spouse) [phone] -- --               History & Physical      Ofelia Stone APRVALE at " "05/25/23 1353     Attestation signed by Daniel Garibay MD at 05/25/23 1438    .                [facility] Pre-op    Full history and physical note from office is attached.    BP (!) 149/107 (BP Location: Right arm, Patient Position: Lying)   Pulse 95   Temp 97.3 °F (36.3 °C) (Temporal)   Resp 16   Ht 167.6 cm (66\")   Wt 90.7 kg (200 lb)   LMP 05/23/2023 (Exact Date)   SpO2 98%   BMI 32.28 kg/m²     Immunizations:  Influenza:  No  Pneumococcal:  No  Tetanus:  UTD  Covid : x3      LAB Results:  Lab Results   Component Value Date    WBC 6.30 05/18/2023    HGB 12.4 05/18/2023    HCT 38.0 05/18/2023    MCV 92.5 05/18/2023     05/18/2023    NEUTROABS 3.50 05/16/2019    GLUCOSE 83 03/23/2022    BUN 7 03/23/2022    CREATININE 0.75 03/23/2022    EGFRIFNONA 125 05/16/2019     03/23/2022    K 3.9 05/18/2023     03/23/2022    CO2 23.1 03/23/2022    CALCIUM 9.5 03/23/2022    ALBUMIN 4.30 03/23/2022    AST 13 03/23/2022    ALT 18 03/23/2022    BILITOT 0.4 03/23/2022 4/7/23 lumbar MRI:  FINDINGS: There is advanced disc space narrowing evident at the L5-S1  level. Vertebrae are of normal height. No malalignment is seen.     L1-2: No disc bulge or protrusion.     L2-3: No disc bulge or protrusion.     L3-4: No disc bulge or protrusion.     L4-5: No disc bulge or protrusion.     L5-S1: Large midline and right paracentral disc extrusion is present.  There is high-grade compromise of the spinal canal, best seen on image  #29 of series 5. There is mild bilateral neural foraminal narrowing.     IMPRESSION:  Large midline and right paracentral disc extrusion L5-S1  with high-grade spinal canal compromise. Please correlate with any  specific radicular symptoms.    Cancer Staging (if applicable)  Cancer Patient: __ yes __no __unknown__N/A; If yes, clinical stage T:__ N:__M:__, stage group or __N/A      Impression: HNP (herniated nucleus pulposus) lumbar      Plan: LUMBAR DISCECTOMY RIGHT " L5-S1      Bertha VANCE Stnoe   2023   13:53 EDT     Electronically signed by Daniel Garibay MD at 23 1777   Source Note          Patient: Bertha Monte  : 1995     Primary Care Provider: Halina Quezada APRN     Requesting Provider: As above           History     Chief Complaint: Low back and right lower extremity pain with sensory alteration.     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  Typically the buttock and thigh are involved.  Earlier on she had more pain more distally.  As of late she started to get some pain in her left hip as well.  She has some numbness in her right leg.  She denies bowel or bladder dysfunction.  Her pain is worse with activity such as bending or twisting.  She has done physical therapy.  She has taken ibuprofen and Flexeril.  Her symptoms remain burdensome.     Review of Systems   Constitutional: Negative for activity change, appetite change, chills, diaphoresis, fatigue, fever and unexpected weight change.   HENT: Negative for congestion, dental problem, drooling, ear discharge, ear pain, facial swelling, hearing loss, mouth sores, nosebleeds, postnasal drip, rhinorrhea, sinus pressure, sinus pain, sneezing, sore throat, tinnitus, trouble swallowing and voice change.    Eyes: Negative for photophobia, pain, discharge, redness, itching and visual disturbance.   Respiratory: Negative for apnea, cough, choking, chest tightness, shortness of breath, wheezing and stridor.    Cardiovascular: Negative for chest pain, palpitations and leg swelling.   Gastrointestinal: Negative for abdominal distention, abdominal pain, anal bleeding, blood in stool, constipation, diarrhea, nausea, rectal pain and vomiting.   Endocrine: Negative for cold intolerance, heat intolerance, polydipsia, polyphagia and polyuria.   Genitourinary: Negative for  decreased urine volume, difficulty urinating, dyspareunia, dysuria, enuresis, flank pain, frequency, genital sores, hematuria, menstrual problem, pelvic pain, urgency, vaginal bleeding, vaginal discharge and vaginal pain.   Musculoskeletal: Positive for back pain, myalgias, neck pain and neck stiffness. Negative for arthralgias, gait problem and joint swelling.   Skin: Negative for color change, pallor, rash and wound.   Allergic/Immunologic: Negative for environmental allergies, food allergies and immunocompromised state.   Neurological: Positive for numbness and headaches. Negative for dizziness, tremors, seizures, syncope, facial asymmetry, speech difficulty, weakness and light-headedness.   Hematological: Negative for adenopathy. Does not bruise/bleed easily.   Psychiatric/Behavioral: Negative for agitation, behavioral problems, confusion, decreased concentration, dysphoric mood, hallucinations, self-injury, sleep disturbance and suicidal ideas. The patient is not nervous/anxious and is not hyperactive.          The patient's past medical history, past surgical history, family history, and social history have been reviewed at length in the electronic medical record.  Past Medical History:   Diagnosis Date   • Allergic 15 years    egg food allergy not severe   • Anemia    • Anxiety 6 months ago   • GERD (gastroesophageal reflux disease)    • Hernia 2018   • Hydradenitis supp    • Hypertension     states no longer high; no medication required   • Low back pain    • Seasonal allergies      Past Surgical History:   Procedure Laterality Date   • ADENOIDECTOMY  2006   • ENDOSCOPY  02/20/2019    Dr. Feliciano- 1 cm hiatal hernia, mild esophagitis, bile aspirate taken for crystal analysis and negative. No other path available.   • ENDOSCOPY N/A 3/24/2023    Procedure: ESOPHAGOGASTRODUODENOSCOPY WITH BIOPSY ;  Surgeon: Garo Limon MD;  Location: [facility] ENDOSCOPY;  Service: Gastroenterology;  Laterality: N/A;   •  "KNEE SURGERY Left     ACL & meniscus repair   • OTOPLASTY Bilateral 2001   • SKIN BIOPSY     • TONSILLECTOMY  2006     Family History   Problem Relation Age of Onset   • No Known Problems Mother    • Hypertension Father      Social History     Socioeconomic History   • Marital status:    Tobacco Use   • Smoking status: Never   • Smokeless tobacco: Never   Vaping Use   • Vaping Use: Never used   Substance and Sexual Activity   • Alcohol use: Not Currently   • Drug use: No   • Sexual activity: Defer           Allergies   Allergen Reactions   • Eggs Or Egg-Derived Products Other (See Comments)     Sneezing       Current Outpatient Medications on File Prior to Visit   Medication Sig Dispense Refill   • Adalimumab (Humira Pen) 40 MG/0.4ML Pen-injector Kit 40 mg 1 (One) Time Per Week.     • clindamycin (CLINDAGEL) 1 % gel Apply  topically to the appropriate area as directed See Admin Instructions. Apply topically to the affected area twice daily     • Diclofenac Sodium (VOLTAREN) 1 % gel gel Apply 4 g topically to the appropriate area as directed 4 (Four) Times a Day As Needed (musculoskeletal pain). 100 g 0   • famotidine (Pepcid) 40 MG tablet Take 1 tablet by mouth Every Night. 90 tablet 1   • lansoprazole (PREVACID) 30 MG capsule TAKE 1 CAPSULE BY MOUTH EVERY DAY 90 capsule 1   • Loratadine 10 MG capsule Claritin     • Prenatal Vit-Fe Fumarate-FA (PRENATAL 1+1 PO) Prenatal     • sertraline (Zoloft) 50 MG tablet Take 1 tablet by mouth Daily. 90 tablet 3     No current facility-administered medications on file prior to visit.            Physical Exam:   Temp 98.2 °F (36.8 °C)   Ht 167.6 cm (66\")   Wt 90.3 kg (199 lb)   BMI 32.12 kg/m²   CONSTITUTIONAL: Patient is well-nourished, pleasant and appears stated age.  MUSCULOSKELETAL:  Straight leg raising is positive on the right at 30 degrees.  Carlos's Sign is negative.  ROM in the low back is normal.  Tenderness in the back to palpation is not " observed.  NEUROLOGICAL:  Orientation, memory, attention span, language function, and cognition have been examined and are intact.  Strength is intact in the lower extremities to direct testing.  Muscle tone is normal throughout.  Station and gait are normal.  Sensation is intact to light touch testing throughout.  Deep tendon reflexes are 1+ and symmetrical.  Coordination is intact.        Medical Decision Making     Data Review:   (All imaging studies were personally reviewed unless stated otherwise)  MRI of the lumbar spine dated 2023 demonstrates degenerative disc disease at L5-S1 with disc base narrowing.  There is a huge central disc herniation that severely compromising the spinal canal.     Diagnosis:   L5-S1 disc herniation with right S1 radiculopathy.     Treatment Options:   Given her ongoing symptoms and the sheer size of the disc herniation I have recommended right L5-S1 discectomy.  The nature of the procedure as well as the potential risks, complications, limitations, and alternatives to the procedure were discussed at length with the patient and the patient has agreed to proceed with surgery.          Diagnosis Plan   1. Lumbar disc herniation with radiculopathy          2. DDD (degenerative disc disease), lumbar                Electronically signed by Daniel Garibay MD at 23 1243             Daniel Garibay MD at 23 1239          Patient: Bertha Monte  : 1995     Primary Care Provider: Halina Quezada APRN     Requesting Provider: As above           History     Chief Complaint: Low back and right lower extremity pain with sensory alteration.     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  Typically the buttock and thigh are involved.  Earlier on she had more pain more distally.  As of late she started to get some pain in her left hip as well.   She has some numbness in her right leg.  She denies bowel or bladder dysfunction.  Her pain is worse with activity such as bending or twisting.  She has done physical therapy.  She has taken ibuprofen and Flexeril.  Her symptoms remain burdensome.     Review of Systems   Constitutional: Negative for activity change, appetite change, chills, diaphoresis, fatigue, fever and unexpected weight change.   HENT: Negative for congestion, dental problem, drooling, ear discharge, ear pain, facial swelling, hearing loss, mouth sores, nosebleeds, postnasal drip, rhinorrhea, sinus pressure, sinus pain, sneezing, sore throat, tinnitus, trouble swallowing and voice change.    Eyes: Negative for photophobia, pain, discharge, redness, itching and visual disturbance.   Respiratory: Negative for apnea, cough, choking, chest tightness, shortness of breath, wheezing and stridor.    Cardiovascular: Negative for chest pain, palpitations and leg swelling.   Gastrointestinal: Negative for abdominal distention, abdominal pain, anal bleeding, blood in stool, constipation, diarrhea, nausea, rectal pain and vomiting.   Endocrine: Negative for cold intolerance, heat intolerance, polydipsia, polyphagia and polyuria.   Genitourinary: Negative for decreased urine volume, difficulty urinating, dyspareunia, dysuria, enuresis, flank pain, frequency, genital sores, hematuria, menstrual problem, pelvic pain, urgency, vaginal bleeding, vaginal discharge and vaginal pain.   Musculoskeletal: Positive for back pain, myalgias, neck pain and neck stiffness. Negative for arthralgias, gait problem and joint swelling.   Skin: Negative for color change, pallor, rash and wound.   Allergic/Immunologic: Negative for environmental allergies, food allergies and immunocompromised state.   Neurological: Positive for numbness and headaches. Negative for dizziness, tremors, seizures, syncope, facial asymmetry, speech difficulty, weakness and light-headedness.    Hematological: Negative for adenopathy. Does not bruise/bleed easily.   Psychiatric/Behavioral: Negative for agitation, behavioral problems, confusion, decreased concentration, dysphoric mood, hallucinations, self-injury, sleep disturbance and suicidal ideas. The patient is not nervous/anxious and is not hyperactive.          The patient's past medical history, past surgical history, family history, and social history have been reviewed at length in the electronic medical record.  Past Medical History:   Diagnosis Date   • Allergic 15 years    egg food allergy not severe   • Anemia    • Anxiety 6 months ago   • GERD (gastroesophageal reflux disease)    • Hernia 2018   • Hydradenitis supp    • Hypertension     states no longer high; no medication required   • Low back pain    • Seasonal allergies      Past Surgical History:   Procedure Laterality Date   • ADENOIDECTOMY  2006   • ENDOSCOPY  02/20/2019    Dr. Feliciano- 1 cm hiatal hernia, mild esophagitis, bile aspirate taken for crystal analysis and negative. No other path available.   • ENDOSCOPY N/A 3/24/2023    Procedure: ESOPHAGOGASTRODUODENOSCOPY WITH BIOPSY ;  Surgeon: Garo Limon MD;  Location: [facility] ENDOSCOPY;  Service: Gastroenterology;  Laterality: N/A;   • KNEE SURGERY Left     ACL & meniscus repair   • OTOPLASTY Bilateral 2001   • SKIN BIOPSY     • TONSILLECTOMY  2006     Family History   Problem Relation Age of Onset   • No Known Problems Mother    • Hypertension Father      Social History     Socioeconomic History   • Marital status:    Tobacco Use   • Smoking status: Never   • Smokeless tobacco: Never   Vaping Use   • Vaping Use: Never used   Substance and Sexual Activity   • Alcohol use: Not Currently   • Drug use: No   • Sexual activity: Defer           Allergies   Allergen Reactions   • Eggs Or Egg-Derived Products Other (See Comments)     Sneezing       Current Outpatient Medications on File Prior to Visit   Medication Sig  "Dispense Refill   • Adalimumab (Humira Pen) 40 MG/0.4ML Pen-injector Kit 40 mg 1 (One) Time Per Week.     • clindamycin (CLINDAGEL) 1 % gel Apply  topically to the appropriate area as directed See Admin Instructions. Apply topically to the affected area twice daily     • Diclofenac Sodium (VOLTAREN) 1 % gel gel Apply 4 g topically to the appropriate area as directed 4 (Four) Times a Day As Needed (musculoskeletal pain). 100 g 0   • famotidine (Pepcid) 40 MG tablet Take 1 tablet by mouth Every Night. 90 tablet 1   • lansoprazole (PREVACID) 30 MG capsule TAKE 1 CAPSULE BY MOUTH EVERY DAY 90 capsule 1   • Loratadine 10 MG capsule Claritin     • Prenatal Vit-Fe Fumarate-FA (PRENATAL 1+1 PO) Prenatal     • sertraline (Zoloft) 50 MG tablet Take 1 tablet by mouth Daily. 90 tablet 3     No current facility-administered medications on file prior to visit.            Physical Exam:   Temp 98.2 °F (36.8 °C)   Ht 167.6 cm (66\")   Wt 90.3 kg (199 lb)   BMI 32.12 kg/m²   CONSTITUTIONAL: Patient is well-nourished, pleasant and appears stated age.  MUSCULOSKELETAL:  Straight leg raising is positive on the right at 30 degrees.  Carlos's Sign is negative.  ROM in the low back is normal.  Tenderness in the back to palpation is not observed.  NEUROLOGICAL:  Orientation, memory, attention span, language function, and cognition have been examined and are intact.  Strength is intact in the lower extremities to direct testing.  Muscle tone is normal throughout.  Station and gait are normal.  Sensation is intact to light touch testing throughout.  Deep tendon reflexes are 1+ and symmetrical.  Coordination is intact.        Medical Decision Making     Data Review:   (All imaging studies were personally reviewed unless stated otherwise)  MRI of the lumbar spine dated 4/7/2023 demonstrates degenerative disc disease at L5-S1 with disc base narrowing.  There is a huge central disc herniation that severely compromising the spinal " canal.     Diagnosis:   L5-S1 disc herniation with right S1 radiculopathy.     Treatment Options:   Given her ongoing symptoms and the sheer size of the disc herniation I have recommended right L5-S1 discectomy.  The nature of the procedure as well as the potential risks, complications, limitations, and alternatives to the procedure were discussed at length with the patient and the patient has agreed to proceed with surgery.          Diagnosis Plan   1. Lumbar disc herniation with radiculopathy          2. DDD (degenerative disc disease), lumbar                Electronically signed by Daniel Garibay MD at 05/05/23 1243          Operative/Procedure Notes (all)      Daniel Garibay MD at 05/25/23 1522  Version 2 of 2       NEUROSURGICAL OPERATIVE NOTE        PREOPERATIVE DIAGNOSIS:    L5-S1 disc herniation with radiculopathy      POSTOPERATIVE DIAGNOSIS:  L5-S1 recess and foraminal narrowing with radiculopathy      PROCEDURE:  Right L5-S1 laminotomy, medial facetectomy, foraminotomy      SURGEON:  Daniel Garibay M.D.      ASSISTANT: Dmitry Vanessa PA-C    PAC assisted with:   Suctioning   Retraction   Tying   Suturing   Closing   Application of dressing   Skilled neurosurgery PA assistance was necessary to perform this procedure.        ANESTHESIA:  General      ESTIMATED BLOOD LOSS: Minimal      SPECIMEN: None      DRAINS: None      COMPLICATIONS: Dural rent      CLINICAL NOTE:  The patient is a 27-year-old woman with progressive back and predominantly right lower extremity pain.  Studies demonstrate what is believed to be a large disc herniation centrally at L5-S1.  As such, the patient presents at this time for lumbar discectomy.  The nature of the procedure as well as the potential risks, complications, limitations, and alternatives to the procedure were discussed at length with the patient and the patient has agreed to proceed with surgery.      TECHNICAL NOTE:  The patient was brought to the operating room  and while on her cart general endotracheal anesthesia was achieved. She was then turned prone on to the Cloward saddle frame. Special care was ensured to protect pressure points. Her low back was prepared and draped in usual fashion. A localizing radiograph was obtained using a spinal needle and the C-arm. Based on this, after preparation and draping, a 2.5 to 3 cm vertical incision was fashioned overlying the L5-S1 interspace. The underlying tissues were divided with cautery to provide exposure to the posterior spinal elements on the right at L5 and S1. Laminotomy was fashioned and another radiograph confirmed the operative level. The medial aspect of the facet was taken down with drill and punches. The neural foramen was decompressed. The dural sac was very tight. I extended the laminotomy upward to provide additional access from above. A blunt probe was utilized to probe more medially and a large osteophyte was identified but no actual soft disc herniation. I then worked below the disc space and the axle of the nerve root. Once again, spurring was palpable, but no soft disc herniation. There was a fair amount of inflammatory tissue and some degree of scarring. The CSF leak occurred within the axilla of the nerve root. I spent more time exploring the area looking for soft disc herniation and despite working medially, laterally, inferiorly, and superiorly, no fragment was noted, just the once again rather firm osteophyte across the disc space. DuraGen was place over site where there was CSF aggressing. This could not be primarily repaired. Adherus sealant was placed over that. Paraspinous muscle and fascia were reapproximated in interrupted fashion with 0 Vicryl suture, 0.25% Marcaine was instilled in the paraspinous musculature and subcutaneous tissues. The subcutaneous tissues were closed in layers with 3-0 Vicryl sutures. The skin was closed in running locked fashion with 3-0 Nylon suture. Sterile dressing was  applied. The patient was subsequently rolled onto her cart, extubated and taken to the recovery room in satisfactory condition.             Daniel Garibay M.D.      Electronically signed by Daniel Garibay MD at 05/26/23 0749     Daniel Garibay MD at 05/25/23 1522  Version 1 of 2       NEUROSURGICAL OPERATIVE NOTE        PREOPERATIVE DIAGNOSIS:    L5-S1 disc herniation with radiculopathy      POSTOPERATIVE DIAGNOSIS:  L5-S1 recess and foraminal narrowing with radiculopathy      PROCEDURE:  Right L5-S1 laminotomy, medial facetectomy, foraminotomy      SURGEON:  Daniel Garibay M.D.      ASSISTANT: Dmitry Vanessa PA-C    PAC assisted with:   Suctioning   Retraction   Tying   Suturing   Closing   Application of dressing   Skilled neurosurgery PA assistance was necessary to perform this procedure.        ANESTHESIA:  General      ESTIMATED BLOOD LOSS: Minimal      SPECIMEN: None      DRAINS: None      COMPLICATIONS: Dural rent      CLINICAL NOTE:  The patient is a 27-year-old woman with progressive back and predominantly right lower extremity pain.  Studies demonstrate what is believed to be a large disc herniation centrally at L5-S1.  As such, the patient presents at this time for lumbar discectomy.  The nature of the procedure as well as the potential risks, complications, limitations, and alternatives to the procedure were discussed at length with the patient and the patient has agreed to proceed with surgery.      TECHNICAL NOTE:   Dictation on: 05/25/2023  4:39 PM by: DANIEL GARIBAY [[id number]]             Daniel Garibay M.D.      Electronically signed by Daniel Garibay MD at 05/25/23 1639          Physician Progress Notes (last 72 hours)      Daniel Garibay MD at 05/30/23 0631          NEUROSURGERY PROGRESS NOTE     LOS: 0 days   Patient Care Team:  Halina Quezada APRN as PCP - General (Family Medicine)  Sofya Tay LPCC as Counselor (Behavioral Health)    Chief Complaint: Back and right leg  "pain.    POD#: 5 Days Post-Op  Procedures:  Right L5-S1 laminotomy and foraminotomy.    Interval History:   Patient Complaints: Right plantar foot numbness.  Patient Denies: Headache.    Vital Signs: Blood pressure 126/79, pulse 72, temperature 98.6 °F (37 °C), temperature source Oral, resp. rate 14, height 167.6 cm (66\"), weight 90.7 kg (200 lb), last menstrual period 05/23/2023, SpO2 98 %, not currently breastfeeding.  Intake/Output:     Intake/Output Summary (Last 24 hours) at 5/30/2023 0631  Last data filed at 5/29/2023 1948  Gross per 24 hour   Intake 790 ml   Output 1600 ml   Net -810 ml       Physical Exam:  The patient awakens easily.  She is in good spirits.  Dry dressing is in place on her incision.       Assessment/Plan:  1.  Suspected L5-S1 disc herniation turned out to be a large osteophyte.  Laminotomy, medial facetectomy, and foraminotomy were pursued.  2.  Intraoperative dural rent, continue bedrest through weekend.  3.  History of MRSA.  On clindamycin gel for cutaneous lesions.  We will add doxycycline.  4.  Disposition:  Slowly mobilize patient.  Hopefully home soon.      Daniel Garibay MD  05/30/23  06:31 EDT      Electronically signed by Daniel Garibay MD at 05/30/23 0634     Daniel Garibay MD at 05/29/23 0705          NEUROSURGERY PROGRESS NOTE     LOS: 0 days   Patient Care Team:  Halina Quezada APRN as PCP - General (Family Medicine)  Sofya Tay LPCC as Counselor (Behavioral Health)    Chief Complaint: Back and right leg pain.    POD#: 4 Days Post-Op  Procedures:  Right L5-S1 laminotomy and foraminotomy.    Interval History:   Patient Complaints: Mild incisional pain.  She has numbness on the bottom of her right foot.  Patient Denies: Headache.    Vital Signs: Blood pressure 128/90, pulse 79, temperature 98.2 °F (36.8 °C), temperature source Oral, resp. rate 16, height 167.6 cm (66\"), weight 90.7 kg (200 lb), last menstrual period 05/23/2023, SpO2 97 %, not currently " "breastfeeding.  Intake/Output:     Intake/Output Summary (Last 24 hours) at 5/29/2023 0705  Last data filed at 5/29/2023 0618  Gross per 24 hour   Intake 1215 ml   Output 2200 ml   Net -985 ml       Physical Exam:  The patient awakens easily.  She is in good spirits.  Dry dressing is in place on her incision.  Motor function is intact in her lower extremities.     Assessment/Plan:  1.  Suspected L5-S1 disc herniation turned out to be a large osteophyte.  Laminotomy, medial facetectomy, and foraminotomy were pursued.  2.  Intraoperative dural rent, continue bedrest through weekend.  3.  History of MRSA.  On clindamycin gel for cutaneous lesions.  We will add doxycycline.  4.  Disposition: Continue bedrest today.  Likely up tomorrow and then home soon thereafter.      Daniel Garibay MD  05/29/23  07:05 EDT      Electronically signed by Daniel Garibay MD at 05/29/23 0709     Faraz Longoria MD at 05/28/23 0900          NEUROSURGERY PROGRESS NOTE    Chief Complaint: Lumbar stenosis    Subjective: Stable overnight.  Denies headache.  Pain controlled.    Objective    Vital Signs: Blood pressure 124/79, pulse 78, temperature 98 °F (36.7 °C), temperature source Oral, resp. rate 16, height 167.6 cm (66\"), weight 90.7 kg (200 lb), last menstrual period 05/23/2023, SpO2 97 %, not currently breastfeeding.    Physical Exam  Awake, alert and oriented x 3  Opens eyes spont  Pupils 3 mm rx bilat  Extraocular muscles intact bilaterally  Face symmetric bilaterally  Tongue midline  5/5 in all 4 ext  Dressing dry    Intake/Output:     Intake/Output Summary (Last 24 hours) at 5/28/2023 0900  Last data filed at 5/28/2023 0314  Gross per 24 hour   Intake 917 ml   Output 3125 ml   Net -2208 ml       Current Medications:   Current Facility-Administered Medications:   •  acetaminophen (TYLENOL) tablet 650 mg, 650 mg, Oral, Q4H PRN, Daniel Garibay MD  •  bisacodyl (DULCOLAX) suppository 10 mg, 10 mg, Rectal, Daily PRN, [place], " Daniel CENTENO MD  •  cetirizine (zyrTEC) tablet 10 mg, 10 mg, Oral, Daily, Daniel Garibay MD, 10 mg at 05/28/23 0859  •  diphenhydrAMINE (BENADRYL) capsule 25 mg, 25 mg, Oral, Nightly PRN, Daniel Garibay MD  •  doxycycline (MONODOX) capsule 100 mg, 100 mg, Oral, Q12H, Daniel Garibay MD, 100 mg at 05/28/23 0859  •  famotidine (PEPCID) tablet 40 mg, 40 mg, Oral, Nightly, Daniel Garibay MD, 40 mg at 05/27/23 2007  •  ibuprofen (ADVIL,MOTRIN) tablet 600 mg, 600 mg, Oral, Q8H, Daniel Garibay MD, 600 mg at 05/28/23 0517  •  morphine injection 2 mg, 2 mg, Intravenous, Q4H PRN **AND** naloxone (NARCAN) injection 0.4 mg, 0.4 mg, Intravenous, Q5 Min PRN, Daniel Garibay MD  •  Morphine sulfate (PF) injection 4 mg, 4 mg, Intravenous, Q4H PRN **AND** naloxone (NARCAN) injection 0.4 mg, 0.4 mg, Intravenous, Q5 Min PRN, Daniel Garibay MD  •  mupirocin (BACTROBAN) 2 % nasal ointment, , Nasal, BID, Daniel Garibay MD, Given at 05/27/23 2008  •  ondansetron (ZOFRAN) tablet 4 mg, 4 mg, Oral, Q6H PRN **OR** ondansetron (ZOFRAN) injection 4 mg, 4 mg, Intravenous, Q6H PRN, Daniel Garibay MD  •  oxyCODONE-acetaminophen (PERCOCET) 7.5-325 MG per tablet 1 tablet, 1 tablet, Oral, Q4H PRN, Daniel Garibay MD, 1 tablet at 05/27/23 2007  •  pantoprazole (PROTONIX) EC tablet 40 mg, 40 mg, Oral, Q AM, Daniel Garibay MD, 40 mg at 05/28/23 0518  •  promethazine (PHENERGAN) tablet 12.5 mg, 12.5 mg, Oral, Q6H PRN **OR** promethazine (PHENERGAN) suppository 12.5 mg, 12.5 mg, Rectal, Q6H PRN, Daniel Garibay MD  •  sennosides-docusate (PERICOLACE) 8.6-50 MG per tablet 2 tablet, 2 tablet, Oral, Nightly PRN, Daniel Garibay MD  •  sertraline (ZOLOFT) tablet 50 mg, 50 mg, Oral, Nightly, Daniel Garibay MD, 50 mg at 05/27/23 2007  •  sodium chloride 0.9 % flush 10 mL, 10 mL, Intravenous, PRN, Daniel Garibay MD  •  sodium chloride 0.9 % flush 3 mL, 3 mL, Intravenous, Q12H, Daniel Garibay MD, 3 mL at 05/28/23 0858  •   sodium chloride 0.9 % infusion 40 mL, 40 mL, Intravenous, PRN, Daniel Garibay MD     Laboratory Results:                               Brief Urine Lab Results  (Last result in the past 365 days)      Color   Clarity   Blood   Leuk Est   Nitrite   Protein   CREAT   Urine HCG        05/25/23 1343               Negative           Microbiology Results (last 10 days)     Procedure Component Value - Date/Time    MRSA Screen Culture (Outpatient) - Swab, Nares [[id number]]  (Normal) Collected: 05/18/23 1058    Lab Status: Final result Specimen: Swab from Nares Updated: 05/19/23 1300     MRSA Screen Cx No Methicillin Resistant Staphylococcus aureus isolated    Narrative:      The negative predictive value of this diagnostic test is high and should only be used to consider de-escalating anti-MRSA therapy. A positive result may indicate colonization with MRSA and must be correlated clinically.             Diagnostic Imaging: I reviewed and independently interpreted the new imaging.     Assessment/Plan:  This is a 28 yo female s/p right L5-S1 laminotomy and foraminotomy with subsequent dural rent on 5/25. She is doing well this morning with no complaints of headache. She has good strength in her legs. Continue strict bed rest, HOB flat.    Any copied data from previous notes included in the (1) History of Present Illness, (2) Physical Examination and (3) Medical Decision Making and/or Assessment and Plan has been reviewed and is accurate as of 05/28/23      Faraz Longoria MD  05/28/23  09:00 EDT        Electronically signed by Faraz Longoria MD at 05/28/23 0901

## 2023-05-31 ENCOUNTER — READMISSION MANAGEMENT (OUTPATIENT)
Dept: CALL CENTER | Facility: HOSPITAL | Age: 28
End: 2023-05-31

## 2023-05-31 VITALS
HEIGHT: 66 IN | SYSTOLIC BLOOD PRESSURE: 128 MMHG | BODY MASS INDEX: 32.14 KG/M2 | TEMPERATURE: 98 F | RESPIRATION RATE: 18 BRPM | HEART RATE: 82 BPM | WEIGHT: 200 LBS | OXYGEN SATURATION: 96 % | DIASTOLIC BLOOD PRESSURE: 99 MMHG

## 2023-05-31 PROCEDURE — 99024 POSTOP FOLLOW-UP VISIT: CPT | Performed by: NEUROLOGICAL SURGERY

## 2023-05-31 PROCEDURE — 97110 THERAPEUTIC EXERCISES: CPT

## 2023-05-31 PROCEDURE — 97116 GAIT TRAINING THERAPY: CPT

## 2023-05-31 PROCEDURE — 25010000002 HEPARIN (PORCINE) PER 1000 UNITS: Performed by: NEUROLOGICAL SURGERY

## 2023-05-31 RX ORDER — DOXYCYCLINE 100 MG/1
100 CAPSULE ORAL EVERY 12 HOURS SCHEDULED
Qty: 14 CAPSULE | Refills: 0 | Status: SHIPPED | OUTPATIENT
Start: 2023-05-31

## 2023-05-31 RX ORDER — OXYCODONE HYDROCHLORIDE AND ACETAMINOPHEN 5; 325 MG/1; MG/1
1 TABLET ORAL 3 TIMES DAILY PRN
Qty: 15 TABLET | Refills: 0 | Status: SHIPPED | OUTPATIENT
Start: 2023-05-31

## 2023-05-31 RX ORDER — ADALIMUMAB 40MG/0.4ML
40 KIT SUBCUTANEOUS WEEKLY
Qty: 2 EACH
Start: 2023-06-09

## 2023-05-31 RX ADMIN — Medication 3 ML: at 08:45

## 2023-05-31 RX ADMIN — CETIRIZINE HYDROCHLORIDE 10 MG: 10 TABLET, FILM COATED ORAL at 08:45

## 2023-05-31 RX ADMIN — DOXYCYCLINE 100 MG: 100 CAPSULE ORAL at 08:45

## 2023-05-31 RX ADMIN — HEPARIN SODIUM 5000 UNITS: 5000 INJECTION INTRAVENOUS; SUBCUTANEOUS at 05:25

## 2023-05-31 RX ADMIN — PANTOPRAZOLE SODIUM 40 MG: 40 TABLET, DELAYED RELEASE ORAL at 05:26

## 2023-05-31 RX ADMIN — IBUPROFEN 600 MG: 600 TABLET ORAL at 05:26

## 2023-05-31 NOTE — DISCHARGE SUMMARY
[facility]    Date of Admission: 5/25/2023  Date of Discharge:  5/31/2023    Discharge Diagnosis: Suspected L5-S1 disc herniation that turned out to be a large osteophyte.  A laminotomy, medial facetectomy, and foraminotomy were pursued.    Procedures Performed  Procedure(s):  LUMBAR FORAMINOTOMY RIGHT L5-S1        History of Present Illness:  Ms. Monte is a  27-year-old health .  In January 2023 she awoke with severe pain in the back to radiate down the right leg.  Her buttock and thigh were involved.  She is also harbored pain in the left hip along with numbness in the right leg.  Pain was worse with activity.  She tried pharmacological and conservative measures.  Studies demonstrated what was believed to be a large disc herniation centrally at L5-S1.  As such, she underwent L5-S1 lumbar discectomy that turned out to be a large osteophyte.  A laminotomy, medial facetectomy, and foraminotomy were pursued instead.  A dural rent occurred during the procedure.    Hospital Course:   She was discharged 6 days postoperatively.  Unfortunately, she suffered a dural rent during the procedure that kept her bedridden during her stay.  Vital signs remained stable.  Motor function remained intact in bilateral lower extremities.  She denied headaches during her stay.  She suffered from some back soreness and numbness in the right leg greatest into the right foot.  However, this did improve slowly.  Patient also has a history of MRSA where she was on clindamycin gel for cutaneous lesions where doxycycline was also added.  Dressing remained in place over the incision.  She remained in good spirits.  She worked alongside PT/OT.  She was discharged home today and will follow-up in the office and approximate 8 to 10 days for suture removal.    Condition on Discharge:  Stable  Discharge to: Home      Discharge Medications     Discharge Medications      New Medications       Instructions Start Date   doxycycline 100 MG capsule  Commonly known as: MONODOX   100 mg, Oral, Every 12 Hours Scheduled      oxyCODONE-acetaminophen 5-325 MG per tablet  Commonly known as: PERCOCET   1 tablet, Oral, 3 Times Daily PRN         Changes to Medications      Instructions Start Date   Humira Pen 40 MG/0.4ML Pen-injector Kit  Generic drug: Adalimumab  What changed:   · See the new instructions.  · These instructions start on June 9, 2023. If you are unsure what to do until then, ask your doctor or other care provider.   40 mg, Subcutaneous, Weekly   Start Date: June 9, 2023        Continue These Medications      Instructions Start Date   clindamycin 1 % gel  Commonly known as: CLINDAGEL   Topical, See Admin Instructions, Apply topically to the affected area twice daily      Diclofenac Sodium 1 % gel gel  Commonly known as: VOLTAREN   4 g, Topical, 4 Times Daily PRN      famotidine 40 MG tablet  Commonly known as: Pepcid   40 mg, Oral, Nightly      lansoprazole 30 MG capsule  Commonly known as: PREVACID   TAKE 1 CAPSULE BY MOUTH EVERY DAY      Loratadine 10 MG capsule   Take 1 capsule by mouth Daily.      PRENATAL 1+1 PO   Prenatal      sertraline 50 MG tablet  Commonly known as: Zoloft   50 mg, Oral, Daily             Follow-up Appointments  Future Appointments   Date Time Provider Department Center   6/9/2023  9:30 AM Tennille Enrique PA-C [facility]   6/13/2023  8:00 AM Sofya Tay [facility]CC [facility]   6/21/2023  1:00 PM Elvia Carroll PA-C [facility]   6/21/2023  3:00 PM Victorina Rivera PA-C [facility]     Additional Instructions for the Follow-ups that You Need to Schedule     Discharge Follow-up with Specified Provider: [place]; 3 Weeks   As directed      To: [place]    Follow Up: 3 Weeks    Follow Up Details: Follow-up in approximately 8-10 days with [facility] for wound check and suture removal.               Referring Provider  Daniel Garibay MD    PCP   Halina Quezada  N, VANCE Vanessa PA-C  05/31/23  14:32 EDT

## 2023-05-31 NOTE — THERAPY TREATMENT NOTE
Patient Name: Bertha Monte  : 1995    MRN: [id number]                              Today's Date: 2023       Admit Date: 2023    Visit Dx:     ICD-10-CM ICD-9-CM   1. HNP (herniated nucleus pulposus), lumbar  M51.26 722.10     Patient Active Problem List   Diagnosis   • Hiatal hernia with GERD   • Hidradenitis suppurativa   • Primary hypertension   • History of hiatal hernia   • HNP (herniated nucleus pulposus), lumbar   • Dural tear     Past Medical History:   Diagnosis Date   • Anemia    • Anxiety 6 months ago   • GERD (gastroesophageal reflux disease)    • Hernia 2018    Hiatal   • Hydradenitis supp    • Hypertension     states no longer high; no medication required   • Low back pain    • MRSA infection     , under breasts due to HS   • Seasonal allergies      Past Surgical History:   Procedure Laterality Date   • ADENOIDECTOMY     • ENDOSCOPY  2019    Dr. Feliciano- 1 cm hiatal hernia, mild esophagitis, bile aspirate taken for crystal analysis and negative. No other path available.   • ENDOSCOPY N/A 3/24/2023    Procedure: ESOPHAGOGASTRODUODENOSCOPY WITH BIOPSY ;  Surgeon: Garo Limon MD;  Location: [facility] ENDOSCOPY;  Service: Gastroenterology;  Laterality: N/A;   • KNEE SURGERY Left     ACL & meniscus repair   • LUMBAR DISCECTOMY Right 2023    Procedure: LUMBAR FORAMINOTOMY RIGHT L5-S1;  Surgeon: Daniel Garibay MD;  Location: [facility];  Service: Neurosurgery;  Laterality: Right;   • OTOPLASTY Bilateral    • SKIN BIOPSY     • TONSILLECTOMY        General Information     Row Name 23 0840          Physical Therapy Time and Intention    Document Type evaluation  -AB     Mode of Treatment physical therapy  -AB     Row Name 23 0840          General Information    Patient Profile Reviewed yes  -AB     Existing Precautions/Restrictions fall;spinal;other (see comments)  R sided plantar surface decreased sensation.  -AB     Barriers to Rehab  impaired sensation  -AB     Row Name 05/31/23 0840          Cognition    Orientation Status (Cognition) oriented x 4  -AB     Row Name 05/31/23 0840          Safety Issues, Functional Mobility    Safety Issues Affecting Function (Mobility) insight into deficits/self-awareness;awareness of need for assistance;safety precaution awareness  -AB     Impairments Affecting Function (Mobility) balance;endurance/activity tolerance;pain;sensation/sensory awareness;range of motion (ROM);strength  -AB           User Key  (r) = Recorded By, (t) = Taken By, (c) = Cosigned By    Initials Name Provider Type    AB Kristy Allred PT Physical Therapist               Mobility     Row Name 05/31/23 0841          Bed Mobility    Bed Mobility supine-sit;scooting/bridging  -AB     Scooting/Bridging [place] (Bed Mobility) supervision;1 person assist;verbal cues  -AB     Supine-Sit [place] (Bed Mobility) supervision;1 person assist;verbal cues  -AB     Assistive Device (Bed Mobility) head of bed elevated  -AB     Comment, (Bed Mobility) Able to maintain spinal precautions during tranfer.  -AB     Row Name 05/31/23 0841          Transfers    Comment, (Transfers) Spinal precautions reviewed. Cues to maintain intermittently.  -AB     Row Name 05/31/23 0841          Bed-Chair Transfer    Bed-Chair [place] (Transfers) contact guard;1 person assist;verbal cues  -AB     Assistive Device (Bed-Chair Transfers) cane, straight  -AB     Row Name 05/31/23 0841          Sit-Stand Transfer    Sit-Stand [place] (Transfers) standby assist  -AB     Assistive Device (Sit-Stand Transfers) cane, straight  -AB     Row Name 05/31/23 0841          Gait/Stairs (Locomotion)    [place] Level (Gait) contact guard;1 person assist;verbal cues  -AB     Assistive Device (Gait) cane, straight  -AB     Distance in Feet (Gait) 350  -AB     Deviations/Abnormal Patterns (Gait) bilateral deviations;sanchez decreased;gait speed decreased;stride length  decreased;steppage  -AB     Bilateral Gait Deviations heel strike decreased  -AB     [place] Level (Stairs) contact guard;1 person assist;verbal cues  -AB     Assistive Device (Stairs) cane, straight  -AB     Number of Steps (Stairs) 10  -AB     Ascending Technique (Stairs) step-to-step  -AB     Descending Technique (Stairs) step-to-step  -AB     Comment, (Gait/Stairs) Pt ambulated with step through gait pattern and slow, deliberate sanchez. Cues for sequencing with cane. Gait mechanics improved with cues. Pt also navigated 10 steps with CGAx1 and SPC. No overt LOB or knee buckling. Further gait limited by fatigue and pain.  -AB           User Key  (r) = Recorded By, (t) = Taken By, (c) = Cosigned By    Initials Name Provider Type    AB Kristy Allred, PT Physical Therapist               Obj/Interventions     Row Name 05/31/23 0844          Motor Skills    Therapeutic Exercise shoulder;hip;knee;ankle  -AB     Row Name 05/31/23 0844          Shoulder (Therapeutic Exercise)    Shoulder (Therapeutic Exercise) AROM (active range of motion)  -AB     Shoulder AROM (Therapeutic Exercise) bilateral;flexion;10 repetitions  -AB     Row Name 05/31/23 0844          Hip (Therapeutic Exercise)    Hip (Therapeutic Exercise) AROM (active range of motion)  -AB     Hip AROM (Therapeutic Exercise) bilateral;external rotation;internal rotation;10 repetitions  -AB     Row Name 05/31/23 0844          Knee (Therapeutic Exercise)    Knee (Therapeutic Exercise) isometric exercises;strengthening exercise  -AB     Knee Isometrics (Therapeutic Exercise) bilateral;quad sets;10 repetitions  -AB     Knee Strengthening (Therapeutic Exercise) bilateral;heel slides;10 repetitions  -AB     Row Name 05/31/23 0844          Ankle (Therapeutic Exercise)    Ankle (Therapeutic Exercise) AROM (active range of motion)  -AB     Ankle AROM (Therapeutic Exercise) bilateral;plantarflexion;dorsiflexion;10 repetitions  -AB     Row Name 05/31/23 0844           Balance    Balance Assessment sitting static balance;sitting dynamic balance;standing static balance;standing dynamic balance  -AB     Static Sitting Balance independent  -AB     Dynamic Sitting Balance independent  -AB     Position, Sitting Balance unsupported;sitting edge of bed  -AB     Static Standing Balance standby assist;1-person assist  -AB     Dynamic Standing Balance contact guard;1-person assist;verbal cues  -AB     Position/Device Used, Standing Balance supported;cane, straight  -AB     Balance Interventions sitting;standing;sit to stand;supported;static;dynamic  -AB     Comment, Balance No overt LOB or knee buckling.  -AB           User Key  (r) = Recorded By, (t) = Taken By, (c) = Cosigned By    Initials Name Provider Type    AB Kristy Allred, PT Physical Therapist               Goals/Plan    No documentation.                Clinical Impression     Row Name 05/31/23 0846          Pain    Pretreatment Pain Rating 2/10  -AB     Posttreatment Pain Rating 3/10  -AB     Pain Location - Side/Orientation Right  -AB     Pain Location lower  -AB     Pain Location - extremity  -AB     Pre/Posttreatment Pain Comment Shooting pain.  -AB     Pain Intervention(s) Ambulation/increased activity;Elevated;Repositioned;Rest  -AB     Additional Documentation Pain Scale: Numbers Pre/Post-Treatment (Group)  -AB     Row Name 05/31/23 0846          Plan of Care Review    Plan of Care Reviewed With patient;spouse  -AB     Progress improving  -AB     Outcome Evaluation Pt with good effort and increased ambulatory distance to 350' with CGAx1 and SPC. Pt also navigated 10 steps with SPC and CGAx1. No overt LOB or knee buckling. HEP completed and spinal precautions reviewed. Pt continues to be limited by balancs deficits, decreased endurance, and RLE pain causing functional mobility below baseline. Pt will benefit from further IPPT for addressing deficits. PT rec d/c home with 24/7 assist and OPPT.  -AB     Row Name 05/31/23  0846          Vital Signs    Pre Systolic BP Rehab 128  -AB     Pre Treatment Diastolic BP 99  -AB     Post Systolic BP Rehab 155  -AB     Post Treatment Diastolic    -AB     Pretreatment Heart Rate (beats/min) 77  -AB     Pre SpO2 (%) 96  -AB     O2 Delivery Pre Treatment room air  -AB     O2 Delivery Intra Treatment room air  -AB     O2 Delivery Post Treatment room air  -AB     Pre Patient Position Supine  -AB     Intra Patient Position Standing  -AB     Post Patient Position Sitting  -AB     Row Name 05/31/23 0846          Positioning and Restraints    Pre-Treatment Position in bed  -AB     Post Treatment Position chair  -AB     In Chair notified nsg;reclined;sitting;call light within reach;encouraged to call for assist;with family/caregiver;legs elevated  RN deferred chair alarm  -AB           User Key  (r) = Recorded By, (t) = Taken By, (c) = Cosigned By    Initials Name Provider Type    Kristy Marquis, PT Physical Therapist               Outcome Measures     Row Name 05/31/23 0849          How much help from another person do you currently need...    Turning from your back to your side while in flat bed without using bedrails? 4  -AB     Moving from lying on back to sitting on the side of a flat bed without bedrails? 3  -AB     Moving to and from a bed to a chair (including a wheelchair)? 3  -AB     Standing up from a chair using your arms (e.g., wheelchair, bedside chair)? 4  -AB     Climbing 3-5 steps with a railing? 3  -AB     To walk in hospital room? 3  -AB     AM-PAC 6 Clicks Score (PT) 20  -AB     Highest level of mobility 6 --> Walked 10 steps or more  -AB     Row Name 05/31/23 0849          Functional Assessment    Outcome Measure Options AM-PAC 6 Clicks Basic Mobility (PT)  -AB           User Key  (r) = Recorded By, (t) = Taken By, (c) = Cosigned By    Initials Name Provider Type    Kristy Marquis, PT Physical Therapist                             Physical Therapy Education     Title:  PT OT SLP Therapies (Done)     Topic: Physical Therapy (Done)     Point: Mobility training (Done)     Learning Progress Summary           Patient Acceptance, D,E, VU,DU by AB at 5/31/2023 0849    Acceptance, E,D, VU,DU by AB at 5/30/2023 1109                   Point: Home exercise program (Done)     Learning Progress Summary           Patient Acceptance, D,E, VU,DU by AB at 5/31/2023 0849    Acceptance, E,D, VU,DU by AB at 5/30/2023 1109                   Point: Body mechanics (Done)     Learning Progress Summary           Patient Acceptance, D,E, VU,DU by AB at 5/31/2023 0849    Acceptance, E,D, VU,DU by AB at 5/30/2023 1109                   Point: Precautions (Done)     Learning Progress Summary           Patient Acceptance, D,E, VU,DU by AB at 5/31/2023 0849    Acceptance, E,D, VU,DU by AB at 5/30/2023 1109                               User Key     Initials Effective Dates Name Provider Type Discipline    AB 09/22/22 -  Kristy Allred, PT Physical Therapist PT              PT Recommendation and Plan  Planned Therapy Interventions (PT): balance training, bed mobility training, gait training, home exercise program, postural re-education, patient/family education, ROM (range of motion), stair training, strengthening, stretching, transfer training  Plan of Care Reviewed With: patient, spouse  Progress: improving  Outcome Evaluation: Pt with good effort and increased ambulatory distance to 350' with CGAx1 and SPC. Pt also navigated 10 steps with SPC and CGAx1. No overt LOB or knee buckling. HEP completed and spinal precautions reviewed. Pt continues to be limited by balancs deficits, decreased endurance, and RLE pain causing functional mobility below baseline. Pt will benefit from further IPPT for addressing deficits. PT rec d/c home with 24/7 assist and OPPT.     Time Calculation:    PT Charges     Row Name 05/31/23 0850             Time Calculation    Start Time 0813  -AB      PT Received On 05/31/23  -AB      PT  Goal Re-Cert Due Date 06/09/23  -AB         Timed Charges    58323 - PT Therapeutic Exercise Minutes 8  -AB      04841 - Gait Training Minutes  15  -AB         Total Minutes    Timed Charges Total Minutes 23  -AB       Total Minutes 23  -AB            User Key  (r) = Recorded By, (t) = Taken By, (c) = Cosigned By    Initials Name Provider Type    AB Kristy Allred, PT Physical Therapist              Therapy Charges for Today     Code Description Service Date Service Provider Modifiers Qty    76227660692 HC PT THER PROC EA 15 MIN 5/30/2023 Kristy Allred, PT GP 1    32037456282 HC PT EVAL LOW COMPLEXITY 4 5/30/2023 Kristy Allred, PT GP 1    52676686842 HC PT THER PROC EA 15 MIN 5/31/2023 Kristy Allred, PT GP 1    39685000589 HC GAIT TRAINING EA 15 MIN 5/31/2023 Kristy Allred, PT GP 1          PT G-Codes  Outcome Measure Options: AM-PAC 6 Clicks Basic Mobility (PT)  AM-PAC 6 Clicks Score (PT): 20  PT Discharge Summary  Anticipated Discharge Disposition (PT): home with 24/7 care, home with outpatient therapy services    Kristy Allred, PT  5/31/2023

## 2023-05-31 NOTE — PLAN OF CARE
Goal Outcome Evaluation:  Plan of Care Reviewed With: patient, spouse        Progress: improving  Outcome Evaluation: Pt with good effort and increased ambulatory distance to 350' with CGAx1 and SPC. Pt also navigated 10 steps with SPC and CGAx1. No overt LOB or knee buckling. HEP completed and spinal precautions reviewed. Pt continues to be limited by balancs deficits, decreased endurance, and RLE pain causing functional mobility below baseline. Pt will benefit from further IPPT for addressing deficits. PT rec d/c home with 24/7 assist and OPPT.

## 2023-05-31 NOTE — PAYOR COMM NOTE
"Ofelia Monte (27 y.o. Female)     [id number]    [place], [place]  Utilization Review  [phone]  [phone]      DC date 5/31/2023    Date of Birth   1995    Social Security Number       Address   [address] [place] [address]    Home Phone   [phone]    MRN   [id number]       Denominational   None    Marital Status                               Admission Date   5/25/23    Admission Type   Elective    Admitting Provider   Daniel Garibay MD    Attending Provider       Department, Room/Bed   [address], S384/1       Discharge Date   5/31/2023    Discharge Disposition   Home or Self Care    Discharge Destination   Home                            Attending Provider: (none)   Allergies: Eggs Or Egg-derived Products    Isolation: None   Infection: None   Code Status: CPR    Ht: 167.6 cm (66\")   Wt: 90.7 kg (200 lb)    Admission Cmt: None   Principal Problem: HNP (herniated nucleus pulposus), lumbar [M51.26]                 Active Insurance as of 5/25/2023     Primary Coverage     Payor Plan Insurance Group Employer/Plan Group    [employer]HEM BLUE CROSS ANTHEM BLUE CROSS BLUE SHIELD PPO [id number]     Payor Plan Address Payor Plan Phone Number Payor Plan Fax Number Effective Dates    [address] [phone]  1/1/2021 - None Entered    [facility] [id number]       Subscriber Name Subscriber Birth Date Member ID       HENRIQUE MONTE 1995 [id number]           Secondary Coverage     Payor Plan Insurance Group Employer/Plan Group    [employer] [place] [employer] MEDICAID      Payor Plan Address Payor Plan Phone Number Payor Plan Fax Number Effective Dates    [address] [address] [phone]  2/27/2018 - None Entered    [facility] [id number]       Subscriber Name Subscriber Birth Date Member ID       HUNTEROFELIA JOHNS 1995 [id number]                 Emergency Contacts      (Rel.) Home Phone Work Phone Mobile Phone    HENRIQUE MONTE (Spouse) " "[phone] -- --               Physician Progress Notes (last 24 hours)      Daniel Garibay MD at 05/31/23 0557          NEUROSURGERY PROGRESS NOTE     LOS: 1 day   Patient Care Team:  Halina Quezada APRN as PCP - General (Family Medicine)  Sofya Tay LPCC as Counselor (Behavioral Health)    Chief Complaint: Back and right leg pain.    POD#: 6 Days Post-Op  Procedures:  Right L5-S1 laminotomy and foraminotomy.    Interval History:   Patient Complaints: Some soreness in her leg after ambulating.  Patient Denies: Headache.    Vital Signs: Blood pressure 125/94, pulse 81, temperature 98.3 °F (36.8 °C), temperature source Oral, resp. rate 18, height 167.6 cm (66\"), weight 90.7 kg (200 lb), last menstrual period 05/23/2023, SpO2 99 %, not currently breastfeeding.  Intake/Output:     Intake/Output Summary (Last 24 hours) at 5/31/2023 0557  Last data filed at 5/30/2023 2256  Gross per 24 hour   Intake 360 ml   Output 850 ml   Net -490 ml       Physical Exam:  Patient is awake and alert.  Dry dressing is in place on her incision.       Assessment/Plan:  1.  Suspected L5-S1 disc herniation turned out to be a large osteophyte.  Laminotomy, medial facetectomy, and foraminotomy were pursued.  2.  Intraoperative dural rent, continue bedrest through weekend.  3.  History of MRSA.  On clindamycin gel for cutaneous lesions.   4.  Disposition:   Home today.  Follow-up with PA-C in the office in approximately 8-10 days for suture removal.      Daniel Garibay MD  05/31/23  05:57 EDT      Electronically signed by Daniel Garibay MD at 05/31/23 0602       Consult Notes (last 24 hours)  Notes from 05/30/23 1210 through 05/31/23 1210   No notes of this type exist for this encounter.         "

## 2023-05-31 NOTE — PROGRESS NOTES
"NEUROSURGERY PROGRESS NOTE     LOS: 1 day   Patient Care Team:  Halina Quezada APRN as PCP - General (Family Medicine)  Sofya Tay LPCC as Counselor (Behavioral Health)    Chief Complaint: Back and right leg pain.    POD#: 6 Days Post-Op  Procedures:  Right L5-S1 laminotomy and foraminotomy.    Interval History:   Patient Complaints: Some soreness in her leg after ambulating.  Patient Denies: Headache.    Vital Signs: Blood pressure 125/94, pulse 81, temperature 98.3 °F (36.8 °C), temperature source Oral, resp. rate 18, height 167.6 cm (66\"), weight 90.7 kg (200 lb), last menstrual period 05/23/2023, SpO2 99 %, not currently breastfeeding.  Intake/Output:     Intake/Output Summary (Last 24 hours) at 5/31/2023 0557  Last data filed at 5/30/2023 2256  Gross per 24 hour   Intake 360 ml   Output 850 ml   Net -490 ml       Physical Exam:  Patient is awake and alert.  Dry dressing is in place on her incision.       Assessment/Plan:  1.  Suspected L5-S1 disc herniation turned out to be a large osteophyte.  Laminotomy, medial facetectomy, and foraminotomy were pursued.  2.  Intraoperative dural rent, continue bedrest through weekend.  3.  History of MRSA.  On clindamycin gel for cutaneous lesions.   4.  Disposition:   Home today.  Follow-up with PA-JAYLEN in the office in approximately 8-10 days for suture removal.      Daniel Garibay MD  05/31/23  05:57 EDT    "

## 2023-06-01 ENCOUNTER — TRANSITIONAL CARE MANAGEMENT TELEPHONE ENCOUNTER (OUTPATIENT)
Dept: CALL CENTER | Facility: HOSPITAL | Age: 28
End: 2023-06-01

## 2023-06-01 NOTE — OUTREACH NOTE
Call Center TCM Note    Flowsheet Row Responses   [facility] patient discharged from? [place]   Does the patient have one of the following disease processes/diagnoses(primary or secondary)? General Surgery   TCM attempt successful? Yes   Call start time 1256   Call end time 1259   Discharge diagnosis herniated nucleus pulposus, LUMBAR FORAMINOTOMY RIGHT L5-S1   Meds reviewed with patient/caregiver? Yes   Is the patient having any side effects they believe may be caused by any medication additions or changes? No   Does the patient have all medications related to this admission filled (includes all antibiotics, pain medications, etc.) Yes   Is the patient taking all medications as directed (includes completed medication regime)? Yes   Does the patient have an appointment with their PCP within 7 days of discharge? No appointments available   Nursing Interventions Routed TCM call to PCP office   Has home health visited the patient within 72 hours of discharge? N/A   Psychosocial issues? No   Did the patient receive a copy of their discharge instructions? Yes   Nursing interventions Reviewed instructions with patient   What is the patient's perception of their health status since discharge? Improving   Nursing interventions Nurse provided patient education   Is the patient /caregiver able to teach back basic post-op care? Continue use of incentive spirometry at least 1 week post discharge, Take showers only when approved by MD-sponge bathe until then, Do not remove steri-strips, Lifting as instructed by MD in discharge instructions, Practice 'cough and deep breath', Drive as instructed by MD in discharge instructions, No tub bath, swimming, or hot tub until instructed by MD, Keep incision areas clean,dry and protected   Is the patient/caregiver able to teach back signs and symptoms of incisional infection? Increased redness, swelling or pain at the incisonal site, Pus or odor from incision, Fever, Increased  drainage or bleeding, Incisional warmth   Is the patient/caregiver able to teach back steps to recovery at home? Set small, achievable goals for return to baseline health, Practice good oral hygiene, Eat a well-balance diet, Rest and rebuild strength, gradually increase activity, Weigh daily, Make a list of questions for surgeon's appointment   If the patient is a current smoker, are they able to teach back resources for cessation? Not a smoker   Is the patient/caregiver able to teach back the hierarchy of who to call/visit for symptoms/problems? PCP, Specialist, Home health nurse, Urgent Care, ED, 911 Yes   TCM call completed? Yes   Wrap up additional comments D/C DX:herniated nucleus pulposus, LUMBAR FORAMINOTOMY RIGHT L5-S1 - Pt doing fair. Since sx she is having some leg pain, and sensation of numbness from knee to ankle on right. Pt stats her neurosrgn is aware of this. New rx Koxycycline, Oxycodone in place. First POST OP appt is 06/09/2023. PCP VANCE Quezada has no available times I could access to sched TCM APPT.   Call end time [phone]          Haley Mauricio MA    6/1/2023, 13:02 EDT

## 2023-06-01 NOTE — OUTREACH NOTE
Prep Survey    Flowsheet Row Responses   [facility] patient discharged from? [place]   Is LACE score < 7 ? No   Eligibility [facility]   Date of Admission 05/25/23   Date of Discharge 05/31/23   Discharge Disposition Home or Self Care   Discharge diagnosis herniated nucleus pulposus, LUMBAR FORAMINOTOMY RIGHT L5-S1   Does the patient have one of the following disease processes/diagnoses(primary or secondary)? General Surgery   Does the patient have Home health ordered? No   Is there a DME ordered? No   Prep survey completed? Yes          Holly YORK - Registered Nurse

## 2023-06-09 ENCOUNTER — OFFICE VISIT (OUTPATIENT)
Dept: NEUROSURGERY | Facility: CLINIC | Age: 28
End: 2023-06-09
Payer: COMMERCIAL

## 2023-06-09 VITALS
DIASTOLIC BLOOD PRESSURE: 96 MMHG | HEIGHT: 66 IN | WEIGHT: 199.2 LBS | SYSTOLIC BLOOD PRESSURE: 145 MMHG | BODY MASS INDEX: 32.02 KG/M2

## 2023-06-09 DIAGNOSIS — M51.16 LUMBAR DISC HERNIATION WITH RADICULOPATHY: Primary | ICD-10-CM

## 2023-06-09 PROCEDURE — 99024 POSTOP FOLLOW-UP VISIT: CPT | Performed by: PHYSICIAN ASSISTANT

## 2023-06-09 RX ORDER — PREGABALIN 75 MG/1
75 CAPSULE ORAL 2 TIMES DAILY
Qty: 40 CAPSULE | Refills: 1 | Status: SHIPPED | OUTPATIENT
Start: 2023-06-09

## 2023-06-09 NOTE — PROGRESS NOTES
Bertha Monte  1995 06/09/2023  [id number]    CC: Postop follow-up    HPI:  S/P lumbar discectomy at L5-S1 on 5/25/2023.  A dural rent was encountered at the time of surgery and the patient was kept flat in the hospital, as such she was discharged On 5/31/2023.  She has had no difficulties with her surgical incision, no drainage, no swelling.  She does describe a burning pain in the lateral right lower extremity.,  She denies any weakness.    Past Medical History:   Diagnosis Date    Anemia     Anxiety 6 months ago    GERD (gastroesophageal reflux disease)     Headache     Hernia 2018    Hiatal    Hydradenitis supp     Hypertension     states no longer high; no medication required    Low back pain     MRSA infection     2015, under breasts due to HS    Seasonal allergies        Allergies   Allergen Reactions    Eggs Or Egg-Derived Products Other (See Comments)     Sneezing         Current Outpatient Medications:     Adalimumab (Humira Pen) 40 MG/0.4ML Pen-injector Kit, Inject 40 mg under the skin into the appropriate area as directed 1 (One) Time Per Week., Disp: 2 each, Rfl:     clindamycin (CLINDAGEL) 1 % gel, Apply  topically to the appropriate area as directed See Admin Instructions. Apply topically to the affected area twice daily, Disp: , Rfl:     Diclofenac Sodium (VOLTAREN) 1 % gel gel, Apply 4 g topically to the appropriate area as directed 4 (Four) Times a Day As Needed (musculoskeletal pain)., Disp: 100 g, Rfl: 0    doxycycline (MONODOX) 100 MG capsule, Take 1 capsule by mouth Every 12 (Twelve) Hours. Indications: Infection of the Skin and/or Soft Tissue, Disp: 14 capsule, Rfl: 0    famotidine (Pepcid) 40 MG tablet, Take 1 tablet by mouth Every Night., Disp: 90 tablet, Rfl: 1    lansoprazole (PREVACID) 30 MG capsule, TAKE 1 CAPSULE BY MOUTH EVERY DAY, Disp: 90 capsule, Rfl: 1    Loratadine 10 MG capsule, Take 1 capsule by mouth Daily., Disp: , Rfl:     oxyCODONE-acetaminophen (PERCOCET) 5-325  "MG per tablet, Take 1 tablet by mouth 3 (Three) Times a Day As Needed (Pain)., Disp: 15 tablet, Rfl: 0    Prenatal Vit-Fe Fumarate-FA (PRENATAL 1+1 PO), Prenatal, Disp: , Rfl:     sertraline (Zoloft) 50 MG tablet, Take 1 tablet by mouth Daily., Disp: 90 tablet, Rfl: 3    pregabalin (Lyrica) 75 MG capsule, Take 1 capsule by mouth 2 (Two) Times a Day., Disp: 40 capsule, Rfl: 1    Review of Systems   Neurological:  Positive for numbness.       PE:  /96 (BP Location: Right arm, Patient Position: Sitting, Cuff Size: Adult)   Ht 167.6 cm (66\")   Wt 90.4 kg (199 lb 3.2 oz)   LMP 05/23/2023 (Exact Date)   BMI 32.15 kg/m²   Heart- RRR  Lungs- no wheezing, normal expansion    Wound-well-healed, sutures removed today without any difficulties.    Neurologic Exam   Motor examination is intact.  Sensory examination reveals a painful hamulus to the right lateral lower extremity and calf.    MDM     Activities and restrictions were discussed.  Wound care was discussed with the patient.  Diagnoses and all orders for this visit:    1. Lumbar disc herniation with radiculopathy (Primary)  -     pregabalin (Lyrica) 75 MG capsule; Take 1 capsule by mouth 2 (Two) Times a Day.  Dispense: 40 capsule; Refill: 1    This 27-year-old female is status post L5-S1 discectomy with dural rent.  Her wound is dry and intact, her sutures removed today.  She has a follow-up appointment with us in the office in 2 weeks for reevaluation.  With her neuropathy in the right lower extremity I have offered her Lyrica 75 mg twice a day and she would like to start this medication.  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  She will remain off work at this time we will reevaluate her work status at her follow-up appointment in 2 weeks.    Any copied data from previous notes included in the (1) HPI, (2) PE, (3) MDM and/or Assessment and Plan has been reviewed and is accurate " as of 6/9/2023.    IMELDA Whitehead  Answers submitted by the patient for this visit:  Other (Submitted on 6/2/2023)  Please describe your symptoms.: Post op appointment following surgery performed on 5/25/23.  Have you had these symptoms before?: No  How long have you been having these symptoms?: Greater than 2 weeks  Primary Reason for Visit (Submitted on 6/2/2023)  What is the primary reason for your visit?: Other

## 2023-06-09 NOTE — LETTER
June 9, 2023     Patient: Bertha Monte   YOB: 1995   Date of Visit: 6/9/2023       To Whom It May Concern:    It is my medical opinion that Bertha Monte  has been unable to work since 5/25/2023 .  She has undergone lumbar surgery And was hospitalized from 5/25/2023 until 5/31/2023.  She is seen in the office today, 6/9/2023, for her first postop visit.  She will be seen again in the office in 2 weeks.  I anticipate that she will be off work for 4 weeks after that date of 6/21/2023.           Sincerely,        Tennille Enrique PA-C    CC:   No Recipients

## 2023-06-12 DIAGNOSIS — K21.9 GASTROESOPHAGEAL REFLUX DISEASE, UNSPECIFIED WHETHER ESOPHAGITIS PRESENT: ICD-10-CM

## 2023-06-12 RX ORDER — FAMOTIDINE 40 MG/1
40 TABLET, FILM COATED ORAL NIGHTLY
Qty: 90 TABLET | Refills: 1 | Status: SHIPPED | OUTPATIENT
Start: 2023-06-12

## 2023-06-13 ENCOUNTER — TELEPHONE (OUTPATIENT)
Dept: NEUROSURGERY | Facility: CLINIC | Age: 28
End: 2023-06-13
Payer: COMMERCIAL

## 2023-06-13 NOTE — TELEPHONE ENCOUNTER
Looks like possibly a seroma. Ensure no fevers/ chills. Also think about postural HAs.     If it continues to drain she should be seen tomorrow in [facility]

## 2023-06-13 NOTE — TELEPHONE ENCOUNTER
Provider:  [place]  Surgery/Procedure:  LUMBAR FORAMINOTOMY RIGHT L5-S1   Surgery/Procedure Date:  5-25-23  Last visit:   6-9-23  Next visit: 6-21-23     Reason for call: patient called and said that the incision had started to drain some she has changed it at 11:00 and then again at 2:50 this afternoon.  She has not been running a fever, no B&B problems, no saddle numbness, not warm to the touch.  It is a little red. She cannot send a picture of the incision but she did send a picture of the drainage. She will try and send a picture of the incision when someone is there to help her.  Patient wants to know if she needs to come in?  Please Advise. Thank you.          When did it start: this morning    Where is it located: middle of back incision site    How long has this been going on/is this new or the same as before surgery: this morning    Characteristics of symptom/severity: draining a yellow looking liquid     Timing- Is it constant or intermittent: constant    What makes it worse: not sure    What makes it better:not sure    What therapies/medications have you tried:

## 2023-06-14 ENCOUNTER — OFFICE VISIT (OUTPATIENT)
Dept: NEUROSURGERY | Facility: CLINIC | Age: 28
End: 2023-06-14
Payer: COMMERCIAL

## 2023-06-14 ENCOUNTER — LAB (OUTPATIENT)
Dept: LAB | Facility: HOSPITAL | Age: 28
End: 2023-06-14
Payer: COMMERCIAL

## 2023-06-14 VITALS — TEMPERATURE: 97.5 F | BODY MASS INDEX: 31.76 KG/M2 | HEIGHT: 66 IN | WEIGHT: 197.6 LBS

## 2023-06-14 DIAGNOSIS — M51.16 LUMBAR DISC HERNIATION WITH RADICULOPATHY: ICD-10-CM

## 2023-06-14 DIAGNOSIS — M51.36 DDD (DEGENERATIVE DISC DISEASE), LUMBAR: ICD-10-CM

## 2023-06-14 DIAGNOSIS — M54.16 LUMBAR RADICULOPATHY: Primary | ICD-10-CM

## 2023-06-14 DIAGNOSIS — L76.82 POSTOPERATIVE COMPLICATION OF SKIN INVOLVING DRAINAGE FROM SURGICAL WOUND: Primary | ICD-10-CM

## 2023-06-14 DIAGNOSIS — L76.82 POSTOPERATIVE COMPLICATION OF SKIN INVOLVING DRAINAGE FROM SURGICAL WOUND: ICD-10-CM

## 2023-06-14 LAB
BASOPHILS # BLD AUTO: 0.04 10*3/MM3 (ref 0–0.2)
BASOPHILS NFR BLD AUTO: 0.3 % (ref 0–1.5)
CRP SERPL-MCNC: 8.64 MG/DL (ref 0–0.5)
DEPRECATED RDW RBC AUTO: 42.2 FL (ref 37–54)
EOSINOPHIL # BLD AUTO: 0.09 10*3/MM3 (ref 0–0.4)
EOSINOPHIL NFR BLD AUTO: 0.6 % (ref 0.3–6.2)
ERYTHROCYTE [DISTWIDTH] IN BLOOD BY AUTOMATED COUNT: 13 % (ref 12.3–15.4)
ERYTHROCYTE [SEDIMENTATION RATE] IN BLOOD: 33 MM/HR (ref 0–20)
HCT VFR BLD AUTO: 37.3 % (ref 34–46.6)
HGB BLD-MCNC: 12.6 G/DL (ref 12–15.9)
IMM GRANULOCYTES # BLD AUTO: 0.05 10*3/MM3 (ref 0–0.05)
IMM GRANULOCYTES NFR BLD AUTO: 0.3 % (ref 0–0.5)
LYMPHOCYTES # BLD AUTO: 2.15 10*3/MM3 (ref 0.7–3.1)
LYMPHOCYTES NFR BLD AUTO: 15 % (ref 19.6–45.3)
MCH RBC QN AUTO: 30.4 PG (ref 26.6–33)
MCHC RBC AUTO-ENTMCNC: 33.8 G/DL (ref 31.5–35.7)
MCV RBC AUTO: 89.9 FL (ref 79–97)
MONOCYTES # BLD AUTO: 1.16 10*3/MM3 (ref 0.1–0.9)
MONOCYTES NFR BLD AUTO: 8.1 % (ref 5–12)
NEUTROPHILS NFR BLD AUTO: 10.85 10*3/MM3 (ref 1.7–7)
NEUTROPHILS NFR BLD AUTO: 75.7 % (ref 42.7–76)
NRBC BLD AUTO-RTO: 0.1 /100 WBC (ref 0–0.2)
PLATELET # BLD AUTO: 313 10*3/MM3 (ref 140–450)
PMV BLD AUTO: 10.7 FL (ref 6–12)
RBC # BLD AUTO: 4.15 10*6/MM3 (ref 3.77–5.28)
WBC NRBC COR # BLD: 14.34 10*3/MM3 (ref 3.4–10.8)

## 2023-06-14 PROCEDURE — 86140 C-REACTIVE PROTEIN: CPT

## 2023-06-14 PROCEDURE — 85652 RBC SED RATE AUTOMATED: CPT

## 2023-06-14 PROCEDURE — 87070 CULTURE OTHR SPECIMN AEROBIC: CPT

## 2023-06-14 PROCEDURE — 85025 COMPLETE CBC W/AUTO DIFF WBC: CPT

## 2023-06-14 PROCEDURE — 87205 SMEAR GRAM STAIN: CPT

## 2023-06-14 PROCEDURE — 99024 POSTOP FOLLOW-UP VISIT: CPT

## 2023-06-14 PROCEDURE — 36415 COLL VENOUS BLD VENIPUNCTURE: CPT

## 2023-06-14 RX ORDER — LANSOPRAZOLE 30 MG/1
30 CAPSULE, DELAYED RELEASE ORAL DAILY
Qty: 90 CAPSULE | Refills: 1 | Status: SHIPPED | OUTPATIENT
Start: 2023-06-14

## 2023-06-14 RX ORDER — GABAPENTIN 300 MG/1
CAPSULE ORAL
Qty: 90 CAPSULE | Refills: 0 | Status: SHIPPED | OUTPATIENT
Start: 2023-06-14

## 2023-06-14 RX ORDER — SULFAMETHOXAZOLE AND TRIMETHOPRIM 800; 160 MG/1; MG/1
1 TABLET ORAL 2 TIMES DAILY
Qty: 20 TABLET | Refills: 0 | Status: SHIPPED | OUTPATIENT
Start: 2023-06-14

## 2023-06-14 NOTE — PROGRESS NOTES
Office Note     Name: Bertha Monte    : 1995     MRN: [id number]     PCP: Halina Quezada APRN    Chief Complaint  Low back pain and right lower extremity sensory alteration.    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  She had associated numbness in the right leg.  The buttock and thigh were also involved.  Symptoms were worse with bending or twisting.  Studies demonstrated what was to be believed a large disc herniation centrally at L5-S1 that turned out to be a large osteophyte instead.  As such, she underwent lumbar discectomy by Dr. Garibay.  Unfortunately, she suffered a dural rent and she was to remain hospitalized and kept flat.  She was discharged on 2023.    She had stitches removed on 2023.  She returns today with concerns of postoperative wound complications.  Over the past 1 to 2 days she has experienced a large amount of draining around the superior pole of her lumbar incision.  She reports of a low-grade fever last night.  She reports that the drainage has a yellowish tinge to it.  She denies positional headaches.  She has had to continue frequently changing her dressing multiple times throughout the day.  Her preoperative symptoms continue to linger.  Over the past day or 2 her symptoms have been exacerbated.  She continues to describe a burning pain in the right lower extremity.  She denies weakness.  She does have a history of MRSA.    Review of Systems:   Review of Systems   Constitutional: Negative.    HENT: Negative.     Eyes: Negative.    Respiratory: Negative.     Cardiovascular: Negative.    Gastrointestinal: Negative.    Endocrine: Negative.    Genitourinary: Negative.    Musculoskeletal:  Positive for back pain, gait problem and myalgias.   Skin: Negative.    Allergic/Immunologic: Negative.    Hematological:  Negative.    Psychiatric/Behavioral: Negative.     All other systems reviewed and are negative.    The patient's past medical history, past surgical history, family history, and social history have been reviewed at length in the electronic medical record.       Past Medical History:   Past Medical History:   Diagnosis Date    Anemia     Anxiety 6 months ago    GERD (gastroesophageal reflux disease)     Headache     Hernia 2018    Hiatal    Hydradenitis supp     Hypertension     states no longer high; no medication required    Low back pain     MRSA (methicillin resistant Staphylococcus aureus) 2016    MRSA infection     2015, under breasts due to HS    Seasonal allergies        Past Surgical History:   Past Surgical History:   Procedure Laterality Date    ADENOIDECTOMY  2006    BACK SURGERY  5/25/23    ENDOSCOPY  02/20/2019    Dr. Feliciano- 1 cm hiatal hernia, mild esophagitis, bile aspirate taken for crystal analysis and negative. No other path available.    ENDOSCOPY N/A 03/24/2023    Procedure: ESOPHAGOGASTRODUODENOSCOPY WITH BIOPSY ;  Surgeon: Garo Limon MD;  Location: [facility] ENDOSCOPY;  Service: Gastroenterology;  Laterality: N/A;    KNEE SURGERY Left     ACL & meniscus repair    LUMBAR DISCECTOMY Right 05/25/2023    Procedure: LUMBAR FORAMINOTOMY RIGHT L5-S1;  Surgeon: Daniel Garibay MD;  Location: [facility] OR;  Service: Neurosurgery;  Laterality: Right;    OTOPLASTY Bilateral 2001    SKIN BIOPSY      TONSILLECTOMY  2006       Family History:   Family History   Problem Relation Age of Onset    No Known Problems Mother     Hypertension Father        Social History:   Social History     Socioeconomic History    Marital status:    Tobacco Use    Smoking status: Never    Smokeless tobacco: Never   Vaping Use    Vaping Use: Never used   Substance and Sexual Activity    Alcohol use: Not Currently    Drug use: No    Sexual activity: Yes     Partners: Male     Birth control/protection: None  "      Immunizations:   Immunization History   Administered Date(s) Administered    COVID-19 (MODERNA) 1st,2nd,3rd Dose Monovalent 08/10/2021, 09/07/2021    Covid-19 (Pfizer) Gray Cap Monovalent 02/23/2022    Hepatitis A 01/25/2019, 07/22/2019        Medications:     Current Outpatient Medications:     Adalimumab (Humira Pen) 40 MG/0.4ML Pen-injector Kit, Inject 40 mg under the skin into the appropriate area as directed 1 (One) Time Per Week., Disp: 2 each, Rfl:     clindamycin (CLINDAGEL) 1 % gel, Apply  topically to the appropriate area as directed See Admin Instructions. Apply topically to the affected area twice daily, Disp: , Rfl:     Diclofenac Sodium (VOLTAREN) 1 % gel gel, Apply 4 g topically to the appropriate area as directed 4 (Four) Times a Day As Needed (musculoskeletal pain)., Disp: 100 g, Rfl: 0    doxycycline (MONODOX) 100 MG capsule, Take 1 capsule by mouth Every 12 (Twelve) Hours. Indications: Infection of the Skin and/or Soft Tissue, Disp: 14 capsule, Rfl: 0    famotidine (Pepcid) 40 MG tablet, Take 1 tablet by mouth Every Night., Disp: 90 tablet, Rfl: 1    lansoprazole (PREVACID) 30 MG capsule, Take 1 capsule by mouth Daily., Disp: 90 capsule, Rfl: 1    Loratadine 10 MG capsule, Take 1 capsule by mouth Daily., Disp: , Rfl:     oxyCODONE-acetaminophen (PERCOCET) 5-325 MG per tablet, Take 1 tablet by mouth 3 (Three) Times a Day As Needed (Pain)., Disp: 15 tablet, Rfl: 0    pregabalin (Lyrica) 75 MG capsule, Take 1 capsule by mouth 2 (Two) Times a Day., Disp: 40 capsule, Rfl: 1    Prenatal Vit-Fe Fumarate-FA (PRENATAL 1+1 PO), Prenatal, Disp: , Rfl:     sertraline (Zoloft) 50 MG tablet, Take 1 tablet by mouth Daily., Disp: 90 tablet, Rfl: 3    Allergies:   Allergies   Allergen Reactions    Eggs Or Egg-Derived Products Other (See Comments)     Sneezing       Objective     Vital Signs  Temp 97.5 °F (36.4 °C) (Infrared)   Ht 167.6 cm (66\")   Wt 89.6 kg (197 lb 9.6 oz)   BMI 31.89 kg/m²   Estimated " "body mass index is 31.89 kg/m² as calculated from the following:    Height as of this encounter: 167.6 cm (66\").    Weight as of this encounter: 89.6 kg (197 lb 9.6 oz).    Physical Exam   Constitutional: Patient is well-developed and appears stated age. Pleasant and   cooperative. Appears in no acute distress.   Incision: I was able to express a mild amount of drainage from the superior pole of the lumbar incision.  Drainage has a yellow color.  There is a mild amount of erythema surrounding the wound.  Wound cultures were sent to the lab.    Tobacco Use: Low Risk     Smoking Tobacco Use: Never    Smokeless Tobacco Use: Never    Passive Exposure: Not on file        Body mass index is 31.89 kg/m².    Fall Risk Assessment was completed, and patient is at low risk for falls.    Assessment and Plan     Medical Decision Making     Diagnosis:  1.  Suspected L5-S1 disc herniation that turned out to be a large osteophyte.  Status post laminotomy, medial facetectomy, and foraminotomy.  2.  Postoperative wound complications  3.  History of MRSA    Treatment Options:   Ms. Monte returns today for concerns of a postoperative wound complication.  Over the past 1 to 2 days she has experienced a fair amount of wound drainage.  She reported a low-grade fever.  Case was reviewed and discussed with Dr. Garibay.  She will obtain labs and inflammatory markers will be taken.  Lumbar wound was cultured and sent off.  A 10-day course of Bactrim was also prescribed.  Wound care instructions were given.  She will follow-up in the office in approximately 1 week.  I have encouraged her to please call if symptoms worsen.  Pending results, further recommendations will ensue.         Diagnosis Plan   1. Postoperative complication of skin involving drainage from surgical wound  Sedimentation Rate    C-reactive Protein    CBC & Differential    Wound Culture - Wound, Spine, Lumbar      2. Lumbar disc herniation with radiculopathy        3. DDD " (degenerative disc disease), lumbar        '        Dmitry Vanessa PA-C  [facility] NEUROSURGERY [address] [address] [phone] submitted by the patient for this visit:  Other (Submitted on 6/13/2023)  Please describe your symptoms.: Drainage from post op incision.  Have you had these symptoms before?: No  How long have you been having these symptoms?: 1-4 days  Primary Reason for Visit (Submitted on 6/13/2023)  What is the primary reason for your visit?: Other

## 2023-06-17 LAB
BACTERIA SPEC AEROBE CULT: NORMAL
GRAM STN SPEC: NORMAL
GRAM STN SPEC: NORMAL

## 2023-08-25 ENCOUNTER — OFFICE VISIT (OUTPATIENT)
Dept: NEUROSURGERY | Facility: CLINIC | Age: 28
End: 2023-08-25
Payer: COMMERCIAL

## 2023-08-25 VITALS
BODY MASS INDEX: 32.78 KG/M2 | WEIGHT: 204 LBS | SYSTOLIC BLOOD PRESSURE: 130 MMHG | DIASTOLIC BLOOD PRESSURE: 90 MMHG | HEIGHT: 66 IN

## 2023-08-25 DIAGNOSIS — M54.16 LUMBAR RADICULOPATHY: ICD-10-CM

## 2023-08-25 DIAGNOSIS — Z98.890 STATUS POST LUMBAR SPINE SURGERY FOR DECOMPRESSION OF SPINAL CORD: Primary | ICD-10-CM

## 2023-08-25 DIAGNOSIS — M25.551 PAIN OF RIGHT HIP: ICD-10-CM

## 2023-08-25 RX ORDER — GABAPENTIN 300 MG/1
300 CAPSULE ORAL 3 TIMES DAILY
Qty: 90 CAPSULE | Refills: 5 | Status: SHIPPED | OUTPATIENT
Start: 2023-08-25

## 2023-08-25 NOTE — PROGRESS NOTES
Patient: Bertha Monte  : 1995    Primary Care Provider: Halina Quezada APRN    Requesting Provider: As above        History    Chief Complaint: Low back and right lower extremity sensory alteration.    History of Present Illness: Ms. 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 She presented to our clinic with progressive back and predominantly right lower extremity pain with sensory alteration. Studies demonstrated foraminal stenosis at L5-S1 on the right. On 2023 she underwent L5-S1 foraminotomy on the right. A dural rent was encountered intraoperatively and patient was kept at bedrest.  Initially she has some drainage from her wound.  She was treated with antibiotics and that ultimately resolved.  She has had no headache.  She complains of low back pain that involves the right hip and this is rather constant and bothersome.  Occasionally she gets a sharp pain in her right leg.  She has numbness in her right calf and in the ball of her foot.  This has receded a little bit.  She continues on gabapentin which is helpful.  Occasionally she takes ibuprofen but has to be careful with that given history of ulcer disease.    Review of Systems   Constitutional:  Negative for activity change, appetite change, chills, diaphoresis, fatigue, fever and unexpected weight change.   HENT:  Negative for congestion, dental problem, drooling, ear discharge, ear pain, facial swelling, hearing loss, mouth sores, nosebleeds, postnasal drip, rhinorrhea, sinus pressure, sneezing, sore throat, tinnitus, trouble swallowing and voice change.    Eyes:  Negative for photophobia, pain, discharge, redness, itching and visual disturbance.   Respiratory:  Negative for apnea, cough, choking, chest tightness, shortness of breath, wheezing and stridor.    Cardiovascular:  Negative for chest pain, palpitations and leg swelling.   Gastrointestinal:  Negative for abdominal distention, abdominal pain, anal  "bleeding, blood in stool, constipation, diarrhea, nausea, rectal pain and vomiting.   Endocrine: Negative for cold intolerance, heat intolerance, polydipsia, polyphagia and polyuria.   Genitourinary:  Negative for decreased urine volume, difficulty urinating, dysuria, enuresis, flank pain, frequency, genital sores, hematuria and urgency.   Musculoskeletal:  Positive for back pain. Negative for arthralgias, gait problem, joint swelling, myalgias, neck pain and neck stiffness.   Skin:  Negative for color change, pallor, rash and wound.   Allergic/Immunologic: Negative for environmental allergies, food allergies and immunocompromised state.   Neurological:  Negative for dizziness, tremors, seizures, syncope, facial asymmetry, speech difficulty, weakness, light-headedness, numbness and headaches.   Hematological:  Negative for adenopathy. Does not bruise/bleed easily.   Psychiatric/Behavioral:  Negative for agitation, behavioral problems, confusion, decreased concentration, dysphoric mood, hallucinations, self-injury, sleep disturbance and suicidal ideas. The patient is not nervous/anxious and is not hyperactive.      The patient's past medical history, past surgical history, family history, and social history have been reviewed at length in the electronic medical record.      Physical Exam:   /90 (BP Location: Right arm, Patient Position: Sitting, Cuff Size: Adult)   Ht 167.6 cm (66\")   Wt 92.5 kg (204 lb)   LMP 07/23/2023 (Approximate)   BMI 32.93 kg/mý   Incision looks good.  Carlos's sign is moderately positive on the right.    Medical Decision Making      Diagnosis:   Right L5-S1 recess and foraminal stenosis status post decompression.    Treatment Options:   The patient still has some ongoing symptoms but they are having much lower grade.  I have renewed her gabapentin.  She will take a little bit of ibuprofen from time to time.  I am going to check a hip x-ray on the right to ensure that she does not " have any primary hip abnormality.  She will follow-up in our clinic in about 3 months to check on her progress.  With time I expect some of her ongoing symptoms to diminish.       Diagnosis Plan   1. Status post lumbar spine surgery for decompression of spinal cord        2. Lumbar radiculopathy        3. Pain of right hip            Scribed for Daniel Garibay MD by Damien Villalobos CMA. 8/25/2023 12:47 EDT    I, Dr. Garibay, personally performed the services described in the documentation, as scribed in my presence, and it is both accurate and complete.

## 2023-09-21 RX ORDER — LANSOPRAZOLE 30 MG/1
30 CAPSULE, DELAYED RELEASE ORAL DAILY
Qty: 90 CAPSULE | Refills: 1 | Status: SHIPPED | OUTPATIENT
Start: 2023-09-21 | End: 2023-09-22 | Stop reason: SDUPTHER

## 2023-09-22 ENCOUNTER — TELEPHONE (OUTPATIENT)
Dept: INTERNAL MEDICINE | Facility: CLINIC | Age: 28
End: 2023-09-22

## 2023-09-22 ENCOUNTER — OFFICE VISIT (OUTPATIENT)
Dept: INTERNAL MEDICINE | Facility: CLINIC | Age: 28
End: 2023-09-22
Payer: COMMERCIAL

## 2023-09-22 ENCOUNTER — PRIOR AUTHORIZATION (OUTPATIENT)
Dept: INTERNAL MEDICINE | Facility: CLINIC | Age: 28
End: 2023-09-22

## 2023-09-22 VITALS
TEMPERATURE: 98.1 F | BODY MASS INDEX: 33.23 KG/M2 | HEART RATE: 84 BPM | WEIGHT: 206.8 LBS | DIASTOLIC BLOOD PRESSURE: 88 MMHG | SYSTOLIC BLOOD PRESSURE: 132 MMHG | OXYGEN SATURATION: 98 % | HEIGHT: 66 IN

## 2023-09-22 DIAGNOSIS — M51.26 HNP (HERNIATED NUCLEUS PULPOSUS), LUMBAR: ICD-10-CM

## 2023-09-22 DIAGNOSIS — R06.83 SNORING: ICD-10-CM

## 2023-09-22 DIAGNOSIS — K21.9 HIATAL HERNIA WITH GERD: ICD-10-CM

## 2023-09-22 DIAGNOSIS — Z00.00 ANNUAL PHYSICAL EXAM: Primary | ICD-10-CM

## 2023-09-22 DIAGNOSIS — Z71.85 IMMUNIZATION COUNSELING: ICD-10-CM

## 2023-09-22 DIAGNOSIS — I10 PRIMARY HYPERTENSION: ICD-10-CM

## 2023-09-22 DIAGNOSIS — K44.9 HIATAL HERNIA WITH GERD: ICD-10-CM

## 2023-09-22 RX ORDER — OMEPRAZOLE 40 MG/1
40 CAPSULE, DELAYED RELEASE ORAL DAILY
Qty: 90 CAPSULE | Refills: 1 | Status: SHIPPED | OUTPATIENT
Start: 2023-09-22

## 2023-09-22 RX ORDER — LANSOPRAZOLE 30 MG/1
30 CAPSULE, DELAYED RELEASE ORAL DAILY
Qty: 90 CAPSULE | Refills: 3 | Status: SHIPPED | OUTPATIENT
Start: 2023-09-22 | End: 2023-09-22 | Stop reason: CLARIF

## 2023-09-22 NOTE — TELEPHONE ENCOUNTER
OFELIA HARRISON Key: [id number] - PA Case ID: [id number] help? Call us at [phone]  Status  [facility]  D[facility]  Omeprazole 40MG dr capsules  Form  Benedict Quintana PA Form (2017 NCPDP)

## 2023-09-22 NOTE — TELEPHONE ENCOUNTER
Caller: Bertha Monte Betty    Relationship: Self    Best call back number: [phone]    What was the call regarding: BERTHA STATES THAT HER INSURANCE WILL NOT COVER LANSOPRAZOLE AND IS REQUESTING AN ALTERNATIVE OR SUBMIT PRIOR AUTHORIZATION. PLEASE ADVISE

## 2023-09-22 NOTE — PROGRESS NOTES
Subjective   Bertha Monte is a 27 y.o. female and is here for a comprehensive physical exam. The patient reports problems - snoring .    HPI: here today for annual physical with above concern.   Dad has JULIO, wears CPAP . States she should have been tested long ago just never did. She does feel frequently fatigued/tired.  has witnessed apneic episodes. She no longer has tonsils.  Work has not been great lately, health . Under more stress lately. Feels like she is managing the stress well with addition of her sertraline.   She would like  Tdap today.     Health Habits:  Eye exam within last 2 years? Yes.   Dental exam every 6 months? Yes.   Exercise habits: No structured exercise  Healthy diet? Typical american diet     The ASCVD Risk score (Karin MEDRANO, et al., 2019) failed to calculate for the following reasons:    The 2019 ASCVD risk score is only valid for ages 40 to 79  Do you take any herbs or supplements that were not prescribed by a doctor? no  Are you taking calcium supplements? No  Are you taking aspirin daily? No     History:  LMP: Patient's last menstrual period was 08/21/2023 (exact date).  Menopause: No  Last pap date: Can't recall, has one scheduled. ]  Abnormal pap? yes  Family history of breast or ovarian cancer: yes- paternal grandmother with breast cancer.     OB History   No obstetric history on file.      reports being sexually active and has had partner(s) who are male. She reports using the following method of birth control/protection: None.    The following portions of the patient's history were reviewed and updated as appropriate: She  has a past medical history of Anemia, Anxiety (6 months ago), GERD (gastroesophageal reflux disease), Headache, Hernia (2018), Hydradenitis supp, Hypertension, Low back pain, MRSA (methicillin resistant Staphylococcus aureus) (2016), MRSA infection, and Seasonal allergies.  She does not have any pertinent problems on file.  She  has a  past surgical history that includes Adenoidectomy (2006); Tonsillectomy (2006); Knee surgery (Left); Otoplasty (Bilateral, 2001); Esophagogastroduodenoscopy (02/20/2019); Skin biopsy; Esophagogastroduodenoscopy (N/A, 03/24/2023); Lumbar discectomy (Right, 05/25/2023); and Back surgery (5/25/23).  Her family history includes Cancer in her maternal grandmother and paternal grandmother; Hyperlipidemia in her father; Hypertension in her father; No Known Problems in her mother.  She  reports that she has never smoked. She has never used smokeless tobacco. She reports that she does not currently use alcohol. She reports that she does not use drugs.  Current Outpatient Medications   Medication Sig Dispense Refill    benzoyl peroxide 10 % external wash       Cephalexin 500 MG tablet Take 1 tablet by mouth Every 12 (Twelve) Hours.      clindamycin (CLINDAGEL) 1 % gel Apply  topically to the appropriate area as directed See Admin Instructions. Apply topically to the affected area twice daily      famotidine (Pepcid) 40 MG tablet Take 1 tablet by mouth Every Night. 90 tablet 1    gabapentin (NEURONTIN) 300 MG capsule Take 1 capsule by mouth 3 (Three) Times a Day. 90 capsule 5    gentamicin (GARAMYCIN) 0.1 % cream Apply  topically to the appropriate area as directed See Admin Instructions. Apply topically to the affected area twice daily      lansoprazole (PREVACID) 30 MG capsule Take 1 capsule by mouth Daily. 90 capsule 1    Loratadine 10 MG capsule Take 1 capsule by mouth Daily.      sertraline (ZOLOFT) 50 MG tablet TAKE 1 TABLET BY MOUTH EVERY DAY 90 tablet 0    predniSONE (DELTASONE) 20 MG tablet Take 1 tablet by mouth 2 (Two) Times a Day. (Patient not taking: Reported on 9/22/2023) 6 tablet 0    valACYclovir (VALTREX) 1000 MG tablet Take 2 tablets by mouth Every 12 (Twelve) Hours. (Patient not taking: Reported on 9/22/2023) 4 tablet 0     No current facility-administered medications for this visit.     Objective   BP  "132/88   Pulse 84   Temp 98.1 °F (36.7 °C) (Temporal)   Ht 167.6 cm (66\")   Wt 93.8 kg (206 lb 12.8 oz)   LMP 08/21/2023 (Exact Date)   SpO2 98%   BMI 33.38 kg/m²     Physical Exam  Vitals and nursing note reviewed.   Constitutional:       General: She is not in acute distress.     Appearance: Normal appearance. She is obese.   HENT:      Right Ear: Tympanic membrane and ear canal normal.      Left Ear: Tympanic membrane and ear canal normal.      Nose: Nose normal.      Mouth/Throat:      Mouth: Mucous membranes are moist.      Pharynx: Oropharynx is clear. No posterior oropharyngeal erythema.   Eyes:      Extraocular Movements: Extraocular movements intact.      Pupils: Pupils are equal, round, and reactive to light.   Neck:      Thyroid: No thyroid mass or thyromegaly.      Vascular: No carotid bruit.   Cardiovascular:      Rate and Rhythm: Normal rate and regular rhythm.      Pulses: Normal pulses.      Heart sounds: Normal heart sounds. No murmur heard.  Pulmonary:      Effort: Pulmonary effort is normal.      Breath sounds: Normal breath sounds. No wheezing.   Abdominal:      General: Bowel sounds are normal. There is no distension.      Palpations: Abdomen is soft. There is no mass.      Tenderness: There is no abdominal tenderness.   Musculoskeletal:         General: No deformity.      Cervical back: Normal range of motion and neck supple. No muscular tenderness.   Lymphadenopathy:      Head:      Right side of head: No submandibular, tonsillar, preauricular or posterior auricular adenopathy.      Left side of head: No submandibular, tonsillar, preauricular or posterior auricular adenopathy.      Cervical: No cervical adenopathy.   Skin:     General: Skin is warm and dry.      Capillary Refill: Capillary refill takes less than 2 seconds.      Findings: No bruising or rash.   Neurological:      Mental Status: She is alert and oriented to person, place, and time.      Gait: Gait normal.      Deep Tendon " Reflexes: Reflexes normal.   Psychiatric:         Mood and Affect: Mood normal.         Behavior: Behavior normal.     Assessment & Plan   Healthy female exam.    Diagnosis Plan   1. Annual physical exam        2. HNP (herniated nucleus pulposus), lumbar  Doing well post surgery, continue exercise and weight loss.       3. Primary hypertension  Ambulatory Referral to Pulmonology    Stable. Continue current medications as prescribed. Recommend DASH diet, moderate-intensity exercises 4-5 times/week, medication compliance, and home blood pressure monitoring.         4. Snoring  Ambulatory Referral to Pulmonology    Referral placed for JULIO.      5.                  6. Hiatal hernia with GERD                   Immunization counseling Avoid eating spicy foods, caffeinated and carbonated beverages, alcohol, and chocolate. Try not to eat or drink within 3 hours of going to bed at night. May elevate the head of the bed. Eat smaller portions. Adhere to medication regimen as prescribed. RTC if symptoms worsen despite interventions.       We discussed the risks and benefits of Boostrix (Tdap).      Counseling was given to inform of the potential signs and symptoms of adverse effects and when to seek medical attention.      The CDC Vaccination Information Sheet (VIS) was given for review prior to administration.  A copy of the VIS was also offered.                2. Patient Counseling:  --Nutrition: Stressed importance of moderation in sodium/caffeine intake, saturated fat and cholesterol, caloric balance, sufficient intake of fresh fruits, vegetables, fiber, calcium, iron, and 1 g folate supplementation if of childbearing age.   --Discussed the issue of calcium supplement, and the daily use of baby aspirin if applicable.             --Mammogram recommended every 2 years from age 40-49 and yearly beginning at age 50.  --Exercise: Stressed the importance of regular exercise.   --Substance Abuse: Discussed cessation/primary  prevention of tobacco (if applicable), alcohol, or other drug use (if applicable); driving or other dangerous activities under the influence; availability of treatment for abuse.    --Sexuality: Discussed sexually transmitted diseases, partner selection, use of condoms, avoidance of unintended pregnancy  and contraceptive alternatives.   --Injury prevention: Discussed safety belts, safety helmets, smoke detector, smoking near bedding or upholstery.   --Dental health: Discussed importance of regular tooth brushing, flossing, and dental visits every 6 months.  --Immunizations reviewed.  --Discussed benefits of screening colonoscopy (if applicable).  --After hours service discussed with patient\  3. Discussed the patient's BMI with her.  The BMI is above average; BMI management plan is completed  4. Return in about 1 year (around 9/22/2024) for Annual.  Halina Quezada, VANCE  09/22/2023  14:13 EDT

## 2023-09-22 NOTE — TELEPHONE ENCOUNTER
Patient states pharmacy told her that insurance will only cover 90 pills a year and needs a prior auth on omeprazole.

## 2023-09-23 NOTE — TELEPHONE ENCOUNTER
Prior Auth has been started on Omeprazole Capsules for the patient: Waiting on outcome.  Key:[id number] Cover my meds

## 2023-09-25 NOTE — TELEPHONE ENCOUNTER
Outcome  Approvedon September 22  Your PA request has been approved. Additional information will be provided in the approval communication. (Message 5515)  Drug  Omeprazole 40MG dr capsules    Notified patient.

## 2023-09-25 NOTE — TELEPHONE ENCOUNTER
Outcome  Approvedon September 22  Your PA request has been approved. Additional information will be provided in the approval communication. (Message 1144)  Drug  Omeprazole 40MG dr capsules  Form  Benedict Electronic PA Form (2017 NCPDP)

## 2023-10-11 ENCOUNTER — OFFICE VISIT (OUTPATIENT)
Dept: OBSTETRICS AND GYNECOLOGY | Facility: CLINIC | Age: 28
End: 2023-10-11
Payer: COMMERCIAL

## 2023-10-11 ENCOUNTER — HOSPITAL ENCOUNTER (OUTPATIENT)
Dept: GENERAL RADIOLOGY | Facility: HOSPITAL | Age: 28
Discharge: HOME | End: 2023-10-11
Admitting: NEUROLOGICAL SURGERY
Payer: COMMERCIAL

## 2023-10-11 VITALS
HEIGHT: 66 IN | BODY MASS INDEX: 33.11 KG/M2 | SYSTOLIC BLOOD PRESSURE: 140 MMHG | WEIGHT: 206 LBS | DIASTOLIC BLOOD PRESSURE: 80 MMHG

## 2023-10-11 DIAGNOSIS — Z12.4 SCREENING FOR MALIGNANT NEOPLASM OF CERVIX: ICD-10-CM

## 2023-10-11 DIAGNOSIS — Z01.419 WELL WOMAN EXAM WITH ROUTINE GYNECOLOGICAL EXAM: Primary | ICD-10-CM

## 2023-10-11 DIAGNOSIS — Z31.9 DESIRE FOR PREGNANCY: ICD-10-CM

## 2023-10-11 DIAGNOSIS — Z98.890 STATUS POST LUMBAR SPINE SURGERY FOR DECOMPRESSION OF SPINAL CORD: ICD-10-CM

## 2023-10-11 DIAGNOSIS — Z11.3 ROUTINE SCREENING FOR STI (SEXUALLY TRANSMITTED INFECTION): ICD-10-CM

## 2023-10-11 PROCEDURE — 73502 X-RAY EXAM HIP UNI 2-3 VIEWS: CPT

## 2023-10-12 LAB — REF LAB TEST METHOD: NORMAL

## 2023-10-13 LAB
A VAGINAE DNA VAG QL NAA+PROBE: NORMAL SCORE
BVAB2 DNA VAG QL NAA+PROBE: NORMAL SCORE
C ALBICANS DNA VAG QL NAA+PROBE: NEGATIVE
C GLABRATA DNA VAG QL NAA+PROBE: NEGATIVE
C TRACH DNA VAG QL NAA+PROBE: NEGATIVE
MEGA1 DNA VAG QL NAA+PROBE: NORMAL SCORE
N GONORRHOEA DNA VAG QL NAA+PROBE: NEGATIVE
T VAGINALIS DNA VAG QL NAA+PROBE: NEGATIVE

## 2023-10-18 ENCOUNTER — OFFICE VISIT (OUTPATIENT)
Dept: OBSTETRICS AND GYNECOLOGY | Facility: CLINIC | Age: 28
End: 2023-10-18
Payer: COMMERCIAL

## 2023-10-18 ENCOUNTER — PREP FOR SURGERY (OUTPATIENT)
Dept: OTHER | Facility: HOSPITAL | Age: 28
End: 2023-10-18
Payer: COMMERCIAL

## 2023-10-18 VITALS
BODY MASS INDEX: 33.11 KG/M2 | HEIGHT: 66 IN | SYSTOLIC BLOOD PRESSURE: 144 MMHG | WEIGHT: 206 LBS | DIASTOLIC BLOOD PRESSURE: 88 MMHG

## 2023-10-18 DIAGNOSIS — G89.29 CHRONIC PELVIC PAIN IN FEMALE: Primary | ICD-10-CM

## 2023-10-18 DIAGNOSIS — N94.6 DYSMENORRHEA: Primary | ICD-10-CM

## 2023-10-18 DIAGNOSIS — G89.29 CHRONIC PELVIC PAIN IN FEMALE: ICD-10-CM

## 2023-10-18 DIAGNOSIS — N94.10 FEMALE DYSPAREUNIA: ICD-10-CM

## 2023-10-18 DIAGNOSIS — R10.2 CHRONIC PELVIC PAIN IN FEMALE: ICD-10-CM

## 2023-10-18 DIAGNOSIS — Z31.9 DESIRE FOR PREGNANCY: ICD-10-CM

## 2023-10-18 DIAGNOSIS — R10.2 CHRONIC PELVIC PAIN IN FEMALE: Primary | ICD-10-CM

## 2023-10-18 DIAGNOSIS — N94.6 DYSMENORRHEA: ICD-10-CM

## 2023-10-18 PROCEDURE — 99214 OFFICE O/P EST MOD 30 MIN: CPT | Performed by: OBSTETRICS & GYNECOLOGY

## 2023-10-18 RX ORDER — SODIUM CHLORIDE 0.9 % (FLUSH) 0.9 %
10 SYRINGE (ML) INJECTION AS NEEDED
OUTPATIENT
Start: 2023-10-18

## 2023-10-18 RX ORDER — SODIUM CHLORIDE 0.9 % (FLUSH) 0.9 %
3 SYRINGE (ML) INJECTION EVERY 12 HOURS SCHEDULED
OUTPATIENT
Start: 2023-10-18

## 2023-10-18 RX ORDER — SODIUM CHLORIDE 9 MG/ML
40 INJECTION, SOLUTION INTRAVENOUS AS NEEDED
OUTPATIENT
Start: 2023-10-18

## 2023-10-18 NOTE — PROGRESS NOTES
Annual Well Woman Visit    Subjective   Chief Complaint   Patient presents with    Gynecologic Exam     Last pap 2019 WNL, heavy and painful periods.     Bertha Monte is a 27 y.o. year old  presenting to be seen for an annual well woman visit.    She reports heavy and painful menses.  She has been attempting pregnancy for roughly 1.5 years.    She denies sexual dysfunction. She denies urinary complaints including incontinence.    OB Hx: Nulliparous  Contraception: None  Pap smear: 2019    Past Medical History:   Diagnosis Date    Anemia     Anxiety 6 months ago    Female infertility 2022    not officially been diagnosed with infertility just going based off of no pregnancy occurring after trying for a year plus    GERD (gastroesophageal reflux disease)     Headache     Hernia     Hiatal    Hydradenitis supp     Hypertension     states no longer high; no medication required    Low back pain     MRSA (methicillin resistant Staphylococcus aureus)     MRSA infection     , under breasts due to HS    PMS (premenstrual syndrome) 2008    Seasonal allergies     Varicella      Past Surgical History:   Procedure Laterality Date    ADENOIDECTOMY  2006    BACK SURGERY  24    ENDOSCOPY  2019    Dr. Feliciano- 1 cm hiatal hernia, mild esophagitis, bile aspirate taken for crystal analysis and negative. No other path available.    ENDOSCOPY N/A 2023    Procedure: ESOPHAGOGASTRODUODENOSCOPY WITH BIOPSY ;  Surgeon: Garo Limon MD;  Location: [facility] ENDOSCOPY;  Service: Gastroenterology;  Laterality: N/A;    KNEE SURGERY Left     ACL & meniscus repair    LUMBAR DISCECTOMY Right 2023    Procedure: LUMBAR FORAMINOTOMY RIGHT L5-S1;  Surgeon: Daniel Garibay MD;  [facility]  Service: Neurosurgery;  Laterality: Right;    OTOPLASTY Bilateral     SKIN BIOPSY      TONSILLECTOMY  2006    WISDOM TOOTH EXTRACTION       Family History   Problem Relation Age of  "Onset    No Known Problems Mother     Hypertension Father     Hyperlipidemia Father     Cancer Maternal Grandmother     Cancer Paternal Grandmother     Breast cancer Paternal Grandmother     Prostate cancer Paternal Grandfather      Social History     Tobacco Use    Smoking status: Never    Smokeless tobacco: Never   Vaping Use    Vaping Use: Never used   Substance Use Topics    Alcohol use: Not Currently    Drug use: Never     (Not in a hospital admission)    Eggs or egg-derived products  Current Outpatient Medications on File Prior to Visit   Medication Sig Dispense Refill    benzoyl peroxide 10 % external wash       Cephalexin 500 MG tablet Take 1 tablet by mouth Every 12 (Twelve) Hours.      clindamycin (CLINDAGEL) 1 % gel Apply  topically to the appropriate area as directed See Admin Instructions. Apply topically to the affected area twice daily      famotidine (Pepcid) 40 MG tablet Take 1 tablet by mouth Every Night. 90 tablet 1    gabapentin (NEURONTIN) 300 MG capsule Take 1 capsule by mouth 3 (Three) Times a Day. 90 capsule 5    gentamicin (GARAMYCIN) 0.1 % cream Apply  topically to the appropriate area as directed See Admin Instructions. Apply topically to the affected area twice daily      Loratadine 10 MG capsule Take 1 capsule by mouth Daily.      omeprazole (priLOSEC) 40 MG capsule Take 1 capsule by mouth Daily. 90 capsule 1    sertraline (ZOLOFT) 50 MG tablet Take 1 tablet by mouth Daily. 90 tablet 3    valACYclovir (VALTREX) 1000 MG tablet Take 2 tablets by mouth Every 12 (Twelve) Hours. 4 tablet 0     No current facility-administered medications on file prior to visit.     Social History    Tobacco Use      Smoking status: Never      Smokeless tobacco: Never      Review of Systems  Pertinent items are noted in HPI, all other systems were reviewed and negative       Objective   /80   Ht 167.6 cm (66\")   Wt 93.4 kg (206 lb)   LMP 08/21/2023 (Exact Date)   BMI 33.25 kg/m²     Physical " Exam:  General Appearance: alert, pleasant, appears stated age, interactive and cooperative  Breasts: Not performed.  Abdomen: no masses, no hepatomegaly, no splenomegaly, soft non-tender, no guarding and no rebound tenderness    Pelvis:  Pelvic: Clinical staff was present for exam  External genitalia:  normal appearance of the external genitalia including Bartholin's and [place]'s glands.  :  urethral meatus normal;  Vagina:  normal pink mucosa without prolapse or lesions.  Cervix:  normal appearance.  Uterus:  normal size, shape and consistency.  Adnexa:  normal bimanual exam of the adnexa.  Rectal:  digital rectal exam not performed; anus visually normal appearing.       Assessment   Annual well woman exam with age appropriate screening  Abnormal uterine bleeding  Dysmenorrhea  Desire for pregnancy  Screening for sexually transmitted infections     Plan    Orders Placed This Encounter   Procedures    NuSwab VG+ - Swab, Cervix     Order Specific Question:   Release to patient     Answer:   Routine Release [[id number]]     Medications ordered: None    Procedures performed: Pap smear    - Mammogram: Not indicated  - Pap screening guidelines reviewed; pap smear collected today  - Yearly clinical breast and pelvic exams recommended regardless of pap recommendations  - Dexa scan: not indicated  - Colonoscopy: not indicated   - Healthy diet and exercise encouraged  - Calcium and Vitamin D requirements reviewed  - Contraception: N/A  - Screening: STI screening rangel    Follow up for ultrasound and ongoing discussion of treatment options    Toribio Garcia MD  Obstetrics and Gynecology  [facility]    V-Y Flap Text: The defect edges were debeveled with a #15 scalpel blade.  Given the location of the defect, shape of the defect and the proximity to free margins a V-Y flap was deemed most appropriate.  Using a sterile surgical marker, an appropriate advancement flap was drawn incorporating the defect and placing the expected incisions within the relaxed skin tension lines where possible.    The area thus outlined was incised deep to adipose tissue with a #15 scalpel blade.  The skin margins were undermined to an appropriate distance in all directions utilizing iris scissors.

## 2023-10-20 NOTE — PROGRESS NOTES
GYN Office Visit    Subjective   Chief Complaint   Patient presents with    Follow-up     TVS done today     Bertha Monte is a 27 y.o. year old  presenting to be seen for follow-up pelvic pain.    No significant updates today.  Pap smear and vaginal cultures were normal.  Ultrasound today.    She reports heavy and painful menses.  She has been attempting pregnancy for roughly 1.5 years.    Partner: Bautista Monte 1999     She denies urinary complaints including incontinence.     OB Hx: Nulliparous  Contraception: None  Pap smear:     Past Medical History:   Diagnosis Date    Anemia     Anxiety 6 months ago    Female infertility 2022    not officially been diagnosed with infertility just going based off of no pregnancy occurring after trying for a year plus    GERD (gastroesophageal reflux disease)     Headache     Hernia     Hiatal    Hydradenitis supp     Hypertension     states no longer high; no medication required    Low back pain     MRSA (methicillin resistant Staphylococcus aureus) 2016    MRSA infection     , under breasts due to HS    PMS (premenstrual syndrome)     Seasonal allergies     Varicella        Past Surgical History:   Procedure Laterality Date    ADENOIDECTOMY  2006    BACK SURGERY  23    ENDOSCOPY  2019    Dr. Feliciano- 1 cm hiatal hernia, mild esophagitis, bile aspirate taken for crystal analysis and negative. No other path available.    ENDOSCOPY N/A 2023    Procedure: ESOPHAGOGASTRODUODENOSCOPY WITH BIOPSY ;  Surgeon: Garo Limon MD;  Location: [facility] ENDOSCOPY;  Service: Gastroenterology;  Laterality: N/A;    KNEE SURGERY Left     ACL & meniscus repair    LUMBAR DISCECTOMY Right 2023    Procedure: LUMBAR FORAMINOTOMY RIGHT L5-S1;  Surgeon: Daniel Garibay MD;  Location: [facility] OR;  Service: Neurosurgery;  Laterality: Right;    OTOPLASTY Bilateral     SKIN BIOPSY      TONSILLECTOMY  2006    WISDOM TOOTH  "EXTRACTION  2020       Family History   Problem Relation Age of Onset    No Known Problems Mother     Hypertension Father     Hyperlipidemia Father     Cancer Maternal Grandmother     Cancer Paternal Grandmother     Breast cancer Paternal Grandmother     Prostate cancer Paternal Grandfather         Social History     Tobacco Use    Smoking status: Never    Smokeless tobacco: Never   Vaping Use    Vaping Use: Never used   Substance Use Topics    Alcohol use: Not Currently    Drug use: Never       (Not in a hospital admission)      Eggs or egg-derived products    Current Outpatient Medications on File Prior to Visit   Medication Sig Dispense Refill    benzoyl peroxide 10 % external wash       Cephalexin 500 MG tablet Take 1 tablet by mouth Every 12 (Twelve) Hours.      clindamycin (CLINDAGEL) 1 % gel Apply  topically to the appropriate area as directed See Admin Instructions. Apply topically to the affected area twice daily      famotidine (Pepcid) 40 MG tablet Take 1 tablet by mouth Every Night. 90 tablet 1    gabapentin (NEURONTIN) 300 MG capsule Take 1 capsule by mouth 3 (Three) Times a Day. 90 capsule 5    gentamicin (GARAMYCIN) 0.1 % cream Apply  topically to the appropriate area as directed See Admin Instructions. Apply topically to the affected area twice daily      Loratadine 10 MG capsule Take 1 capsule by mouth Daily.      omeprazole (priLOSEC) 40 MG capsule Take 1 capsule by mouth Daily. 90 capsule 1    sertraline (ZOLOFT) 50 MG tablet Take 1 tablet by mouth Daily. 90 tablet 3    valACYclovir (VALTREX) 1000 MG tablet Take 2 tablets by mouth Every 12 (Twelve) Hours. 4 tablet 0     No current facility-administered medications on file prior to visit.       Social History    Tobacco Use      Smoking status: Never      Smokeless tobacco: Never         Objective   /88   Ht 167.6 cm (66\")   Wt 93.4 kg (206 lb)   LMP 08/21/2023 (Exact Date)   BMI 33.25 kg/m²     Physical Exam:  General Appearance: alert, " loy, appears stated age, interactive and cooperative         Medical Decision Making:    I reviewed her Pap smear and vaginal cultures from last visit.  I reviewed her ultrasound from today.    Ultrasound:  Normal sized, retroverted uterus with no masses.  Uterine length measures 7.5 cm.  The endometrium measures 10.9 mm.  The right ovary is normal in appearance with follicles and normal vascularity.  The left ovary contains a small, roughly 2 cm cystic lesion that likely reflects a resolving hemorrhagic cyst.  This ovary also has normal vascularity.  Small to moderate amount of free fluid in the cul-de-sac.     Assessment   Chronic pelvic pain  Dysmenorrhea  Dyspareunia  Infertility     Plan    Orders Placed This Encounter   Procedures    US Non-ob Transvaginal     Order Specific Question:   Reason for Exam:     Answer:   infertility     Order Specific Question:   Release to patient     Answer:   Routine Release [[id number]]       Medication Management: None    Procedures Performed: None    We reviewed her situation in detail today.  We discussed the potential for endometriosis.  She would like to move forward with laparoscopy for diagnosis and treatment.  Plan for diagnostic laparoscopy, possible ablation of endometriosis and all other indicated procedures.  Semen analysis was ordered for her partner as above.  We will revisit the plan regarding fertility after her surgery and the semen analysis results are available.  Risks for the procedure were reviewed in detail today.  All questions answered.    Toribio Garcia MD  Obstetrics and Gynecology  [facility]

## 2023-11-21 ENCOUNTER — TELEPHONE (OUTPATIENT)
Dept: PREADMISSION TESTING | Facility: HOSPITAL | Age: 28
End: 2023-11-21

## 2023-11-22 ENCOUNTER — OFFICE VISIT (OUTPATIENT)
Dept: NEUROSURGERY | Facility: CLINIC | Age: 28
End: 2023-11-22
Payer: COMMERCIAL

## 2023-11-22 ENCOUNTER — PRE-ADMISSION TESTING (OUTPATIENT)
Dept: PREADMISSION TESTING | Facility: HOSPITAL | Age: 28
End: 2023-11-22
Payer: COMMERCIAL

## 2023-11-22 VITALS
BODY MASS INDEX: 33.27 KG/M2 | DIASTOLIC BLOOD PRESSURE: 100 MMHG | HEIGHT: 66 IN | TEMPERATURE: 97.8 F | SYSTOLIC BLOOD PRESSURE: 150 MMHG | WEIGHT: 207 LBS

## 2023-11-22 DIAGNOSIS — Z98.890 STATUS POST LUMBAR SPINE SURGERY FOR DECOMPRESSION OF SPINAL CORD: Primary | ICD-10-CM

## 2023-11-22 DIAGNOSIS — G89.29 CHRONIC PELVIC PAIN IN FEMALE: ICD-10-CM

## 2023-11-22 DIAGNOSIS — M54.16 LUMBAR RADICULOPATHY: ICD-10-CM

## 2023-11-22 DIAGNOSIS — R10.2 CHRONIC PELVIC PAIN IN FEMALE: ICD-10-CM

## 2023-11-22 DIAGNOSIS — N94.10 FEMALE DYSPAREUNIA: ICD-10-CM

## 2023-11-22 DIAGNOSIS — N94.6 DYSMENORRHEA: ICD-10-CM

## 2023-11-22 DIAGNOSIS — M25.551 PAIN OF RIGHT HIP: ICD-10-CM

## 2023-11-22 LAB
ANION GAP SERPL CALCULATED.3IONS-SCNC: 8.1 MMOL/L (ref 5–15)
BASOPHILS # BLD AUTO: 0.03 10*3/MM3 (ref 0–0.2)
BASOPHILS NFR BLD AUTO: 0.4 % (ref 0–1.5)
BILIRUB UR QL STRIP: NEGATIVE
BUN SERPL-MCNC: 10 MG/DL (ref 6–20)
BUN/CREAT SERPL: 12.8 (ref 7–25)
CALCIUM SPEC-SCNC: 9.6 MG/DL (ref 8.6–10.5)
CHLORIDE SERPL-SCNC: 104 MMOL/L (ref 98–107)
CLARITY UR: CLEAR
CO2 SERPL-SCNC: 24.9 MMOL/L (ref 22–29)
COLOR UR: YELLOW
CREAT SERPL-MCNC: 0.78 MG/DL (ref 0.57–1)
DEPRECATED RDW RBC AUTO: 42.5 FL (ref 37–54)
EGFRCR SERPLBLD CKD-EPI 2021: 107.6 ML/MIN/1.73
EOSINOPHIL # BLD AUTO: 0.21 10*3/MM3 (ref 0–0.4)
EOSINOPHIL NFR BLD AUTO: 2.5 % (ref 0.3–6.2)
ERYTHROCYTE [DISTWIDTH] IN BLOOD BY AUTOMATED COUNT: 13.1 % (ref 12.3–15.4)
GLUCOSE SERPL-MCNC: 114 MG/DL (ref 65–99)
GLUCOSE UR STRIP-MCNC: NEGATIVE MG/DL
HCT VFR BLD AUTO: 36.8 % (ref 34–46.6)
HGB BLD-MCNC: 12.3 G/DL (ref 12–15.9)
HGB UR QL STRIP.AUTO: NEGATIVE
IMM GRANULOCYTES # BLD AUTO: 0.02 10*3/MM3 (ref 0–0.05)
IMM GRANULOCYTES NFR BLD AUTO: 0.2 % (ref 0–0.5)
KETONES UR QL STRIP: NEGATIVE
LEUKOCYTE ESTERASE UR QL STRIP.AUTO: NEGATIVE
LYMPHOCYTES # BLD AUTO: 2.18 10*3/MM3 (ref 0.7–3.1)
LYMPHOCYTES NFR BLD AUTO: 26.4 % (ref 19.6–45.3)
MCH RBC QN AUTO: 29.7 PG (ref 26.6–33)
MCHC RBC AUTO-ENTMCNC: 33.4 G/DL (ref 31.5–35.7)
MCV RBC AUTO: 88.9 FL (ref 79–97)
MONOCYTES # BLD AUTO: 0.62 10*3/MM3 (ref 0.1–0.9)
MONOCYTES NFR BLD AUTO: 7.5 % (ref 5–12)
NEUTROPHILS NFR BLD AUTO: 5.19 10*3/MM3 (ref 1.7–7)
NEUTROPHILS NFR BLD AUTO: 63 % (ref 42.7–76)
NITRITE UR QL STRIP: NEGATIVE
NRBC BLD AUTO-RTO: 0 /100 WBC (ref 0–0.2)
PH UR STRIP.AUTO: 6.5 [PH] (ref 5–8)
PLATELET # BLD AUTO: 269 10*3/MM3 (ref 140–450)
PMV BLD AUTO: 10.4 FL (ref 6–12)
POTASSIUM SERPL-SCNC: 4.4 MMOL/L (ref 3.5–5.2)
PROT UR QL STRIP: ABNORMAL
RBC # BLD AUTO: 4.14 10*6/MM3 (ref 3.77–5.28)
SODIUM SERPL-SCNC: 137 MMOL/L (ref 136–145)
SP GR UR STRIP: 1.02 (ref 1–1.03)
UROBILINOGEN UR QL STRIP: ABNORMAL
WBC NRBC COR # BLD AUTO: 8.25 10*3/MM3 (ref 3.4–10.8)

## 2023-11-22 PROCEDURE — 81003 URINALYSIS AUTO W/O SCOPE: CPT

## 2023-11-22 PROCEDURE — 99024 POSTOP FOLLOW-UP VISIT: CPT

## 2023-11-22 PROCEDURE — 85025 COMPLETE CBC W/AUTO DIFF WBC: CPT

## 2023-11-22 PROCEDURE — 36415 COLL VENOUS BLD VENIPUNCTURE: CPT

## 2023-11-22 PROCEDURE — 80048 BASIC METABOLIC PNL TOTAL CA: CPT

## 2023-11-22 NOTE — DISCHARGE INSTRUCTIONS
Pre-Admission testing appointment completed today for patient's upcoming procedure here at [facility].    PAT PASS reviewed with patient and they verbalize understanding of the following:     Do not eat or drink anything after midnight the night before procedure unless otherwise instructed by physician/surgeon's office, this includes no gum, candy, mints, tobacco products or e-cigarettes.  Do not shave the area to be operated on at least 48 hours prior to procedure.  Do not wear makeup, lotion, hair products, or nail polish.  Do not wear any jewelry and remove all piercings.  Do not wear any adhesive if you wear dentures.  Do not wear contacts; bring in glasses if needed.  Only take medications on the morning of procedure as instructed by PAT nurse per anesthesia guidelines or as instructed by physician's office.  If you are on any blood thinners reach out to the physician/surgeon's office for instructions on when/if they will need to be stopped prior to procedure.  Bring in picture ID and insurance card, advanced directive copies if applicable, CPAP/BIPAP/Inhalers if indicated morning of procedure, leave any other valuables at home.  Ensure you have arranged for someone to drive you home the day of your procedure and someone to care for you at home afterwards. It is recommended that you do not drive, drink alcohol, or make any major legal decisions for at least 24 hours after your procedure is complete.  Chlorhexadine wipes along with instruction/information sheet given to patient if indicated. Instructed patient to date, time, and initial the verification sheet once skin prep has been  completed, and to return to [facility] the day of the procedure.     Introduction to anesthesia video watched by patient during appointment.  Instructions and information given to patient about parking, hospital entrance, and registration location.

## 2023-11-22 NOTE — PROGRESS NOTES
Office Note     Name: Bertha Monte    : 1995     MRN: [id number]     PCP: Halina Quezada APRN    Chief Complaint  Low back and right lower extremity sensory alteration.    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 She presented to our clinic with progressive back and predominantly right lower extremity pain with sensory alteration. Studies demonstrated foraminal stenosis at L5-S1 on the right. On 2023 she underwent L5-S1 foraminotomy on the right. A dural rent was encountered intraoperatively and patient was kept at bedrest.  Initially she has some drainage from her wound.  She was treated with antibiotics and that ultimately resolved.  She has had no headache.  Her low back pain involving the right hip has slowed down she reports.  She notes that she has good and bad days.  The numbness in the right calf and ball of her foot has improved slowly.  She has noticed some small improvements as time passes.  She continues gabapentin and occasionally takes ibuprofen.  She also notes that many of her preoperative symptoms such as pain down the back of the leg has subsided.  She is doing better on today's visit.      Review of Systems:   Review of Systems   Musculoskeletal:  Positive for arthralgias and back pain.     The patient's past medical history, past surgical history, family history, and social history have been reviewed at length in the electronic medical record.       Past Medical History:   Past Medical History:   Diagnosis Date    Anemia     Anxiety 6 months ago    Female infertility 2022    not officially been diagnosed with infertility just going based off of no pregnancy occurring after trying for a year plus    GERD (gastroesophageal reflux disease)     Headache     Hernia 2018    Hiatal    Hydradenitis supp     Hypertension     states no longer high; no medication required    Low back pain     MRSA  (methicillin resistant Staphylococcus aureus) 2016    MRSA infection     2015, under breasts due to HS    PMS (premenstrual syndrome) 2008    Seasonal allergies     Varicella        Past Surgical History:   Past Surgical History:   Procedure Laterality Date    ADENOIDECTOMY  2006    BACK SURGERY  5/25/23    ENDOSCOPY  02/20/2019    Dr. Feliciano- 1 cm hiatal hernia, mild esophagitis, bile aspirate taken for crystal analysis and negative. No other path available.    ENDOSCOPY N/A 03/24/2023    Procedure: ESOPHAGOGASTRODUODENOSCOPY WITH BIOPSY ;  Surgeon: Garo Limon MD;  Location: [facility] ENDOSCOPY;  Service: Gastroenterology;  Laterality: N/A;    KNEE SURGERY Left     ACL & meniscus repair    LUMBAR DISCECTOMY Right 05/25/2023    Procedure: LUMBAR FORAMINOTOMY RIGHT L5-S1;  Surgeon: Daniel Garibay MD;  Location: [facility] OR;  Service: Neurosurgery;  Laterality: Right;    OTOPLASTY Bilateral 2001    SKIN BIOPSY      TONSILLECTOMY  2006    WISDOM TOOTH EXTRACTION  2020       Family History:   Family History   Problem Relation Age of Onset    No Known Problems Mother     Hypertension Father     Hyperlipidemia Father     Cancer Maternal Grandmother     Cancer Paternal Grandmother     Breast cancer Paternal Grandmother     Prostate cancer Paternal Grandfather        Social History:   Social History     Socioeconomic History    Marital status:    Tobacco Use    Smoking status: Never    Smokeless tobacco: Never   Vaping Use    Vaping Use: Never used   Substance and Sexual Activity    Alcohol use: Not Currently    Drug use: Never    Sexual activity: Yes     Partners: Male     Birth control/protection: None       Immunizations:   Immunization History   Administered Date(s) Administered    COVID-19 (MODERNA) 1st,2nd,3rd Dose Monovalent 08/10/2021, 09/07/2021    Covid-19 (Pfizer) Gray Cap Monovalent 02/23/2022    Hepatitis A 01/25/2019, 07/22/2019    Tdap 09/22/2023        Medications:     Current Outpatient  "Medications:     benzoyl peroxide 10 % external wash, , Disp: , Rfl:     Cephalexin 500 MG tablet, Take 1 tablet by mouth Every 12 (Twelve) Hours., Disp: , Rfl:     clindamycin (CLINDAGEL) 1 % gel, Apply  topically to the appropriate area as directed See Admin Instructions. Apply topically to the affected area twice daily, Disp: , Rfl:     famotidine (Pepcid) 40 MG tablet, Take 1 tablet by mouth Every Night., Disp: 90 tablet, Rfl: 1    gabapentin (NEURONTIN) 300 MG capsule, Take 1 capsule by mouth 3 (Three) Times a Day., Disp: 90 capsule, Rfl: 5    gentamicin (GARAMYCIN) 0.1 % cream, Apply  topically to the appropriate area as directed See Admin Instructions. Apply topically to the affected area twice daily, Disp: , Rfl:     Loratadine 10 MG capsule, Take 1 capsule by mouth Daily., Disp: , Rfl:     omeprazole (priLOSEC) 40 MG capsule, Take 1 capsule by mouth Daily., Disp: 90 capsule, Rfl: 1    Secukinumab (COSENTYX SENSOREADY, 300 MG, SC), Inject  under the skin into the appropriate area as directed., Disp: , Rfl:     sertraline (ZOLOFT) 50 MG tablet, Take 1 tablet by mouth Daily., Disp: 90 tablet, Rfl: 3    valACYclovir (VALTREX) 1000 MG tablet, Take 2 tablets by mouth Every 12 (Twelve) Hours., Disp: 4 tablet, Rfl: 0    Allergies:   Allergies   Allergen Reactions    Eggs Or Egg-Derived Products Other (See Comments)     Sneezing       Objective     Vital Signs  Ht 167.6 cm (66\")   Wt 93.9 kg (207 lb)   BMI 33.41 kg/m²   Estimated body mass index is 33.41 kg/m² as calculated from the following:    Height as of this encounter: 167.6 cm (66\").    Weight as of this encounter: 93.9 kg (207 lb).    Physical Exam   Constitutional: Patient is well-developed and appears stated age. Pleasant and   cooperative. Appears in no acute distress.       Tobacco Use: Low Risk  (11/22/2023)    Patient History     Smoking Tobacco Use: Never     Smokeless Tobacco Use: Never     Passive Exposure: Not on file        Body mass index is " 33.41 kg/m².    Fall Risk Assessment was completed, and patient is at low risk for falls.    Assessment and Plan     Medical Decision Making     Data Review:   (All imaging independently reviewed unless stated otherwise.)   Plain films of the right hip demonstrate degenerative changes of the SI joints.  Hip joints appear intact.  No acute fracture or dislocation noted.    Diagnosis:  Right L5-S1 recess and foraminal stenosis status post decompression     Treatment Options:   The patient returns for a follow-up.  Many of her symptoms supported last visit for low-grade and continue to improve.  She continues gabapentin.  She has good and bad days in terms to her right hip pain.  I discussed multiple options with her going forward.  She has plans to give physical therapy and dry needling ago.  She will also increase her activity.  She may follow-up with us as needed at this juncture.  I have encouraged her to call with new or worsening symptoms.         Diagnosis Plan   1. Status post lumbar spine surgery for decompression of spinal cord        2. Lumbar radiculopathy        3. Pain of right hip                Dmitry Vanessa PA-C  [facility]  [facility] NEUROSURGERY  [address] [address] [phone]

## 2023-11-29 ENCOUNTER — ANESTHESIA EVENT (OUTPATIENT)
Dept: PERIOP | Facility: HOSPITAL | Age: 28
End: 2023-11-29
Payer: COMMERCIAL

## 2023-11-29 ENCOUNTER — OFFICE VISIT (OUTPATIENT)
Dept: PULMONOLOGY | Facility: CLINIC | Age: 28
End: 2023-11-29
Payer: COMMERCIAL

## 2023-11-29 VITALS
BODY MASS INDEX: 33.78 KG/M2 | WEIGHT: 210.2 LBS | OXYGEN SATURATION: 99 % | SYSTOLIC BLOOD PRESSURE: 124 MMHG | RESPIRATION RATE: 18 BRPM | HEART RATE: 93 BPM | DIASTOLIC BLOOD PRESSURE: 82 MMHG | HEIGHT: 66 IN

## 2023-11-29 DIAGNOSIS — G47.33 OBSTRUCTIVE SLEEP APNEA: Primary | ICD-10-CM

## 2023-11-29 DIAGNOSIS — R06.83 SNORING: ICD-10-CM

## 2023-11-29 DIAGNOSIS — G47.19 EXCESSIVE DAYTIME SLEEPINESS: ICD-10-CM

## 2023-11-29 DIAGNOSIS — G47.52 DREAM ENACTMENT BEHAVIOR: ICD-10-CM

## 2023-11-29 DIAGNOSIS — E66.9 OBESITY (BMI 30-39.9): ICD-10-CM

## 2023-11-29 RX ORDER — LANSOPRAZOLE 30 MG/1
30 CAPSULE, DELAYED RELEASE ORAL DAILY
COMMUNITY

## 2023-11-29 NOTE — PROGRESS NOTES
CONSULT NOTE    Requested by:   Halina Quezada APRN Runnels, Laken N, VANCE      Chief Complaint   Patient presents with    Snoring    Consult       Subjective:  Bertha Monte is a 28 y.o. female.   Patient came in today for evaluation of possible sleep apnea. Patient endorses loud snoring. she also says that she has been tired and has fatigue during the day, for the past few years.     The patient's family notes that she has occasional pauses in the breathing & she occasionally wakes up gasping for breath.     The patient says that she rarely gets restful night sleep and her quality has diminished considerably. she does have a tendency to feel sleepy while watching TV and reading a book.      sheoccasionally acts out her dreams.    she is complaining of occasional headaches. There is known family history of sleep apnea, in her father    she drinks 2-3 cups/cans of caffeinated drinks per day.        The following portions of the patient's history were reviewed and updated as appropriate: allergies, current medications, past family history, past medical history, past social history, and past surgical history.    Review of Systems   HENT:  Negative for sinus pressure, sneezing and sore throat.    Respiratory:  Negative for cough, chest tightness, shortness of breath and wheezing.    Cardiovascular:  Negative for palpitations and leg swelling.   Psychiatric/Behavioral:  Positive for sleep disturbance.    All other systems reviewed and are negative.      Past Medical History:   Diagnosis Date    Anemia     Anxiety 6 months ago    Female infertility January 2022    not officially been diagnosed with infertility just going based off of no pregnancy occurring after trying for a year plus    GERD (gastroesophageal reflux disease)     Headache     Hernia 2018    Hiatal    Hydradenitis supp     Low back pain     MRSA infection     2015, under breasts due to HS    PMS (premenstrual syndrome) 2008    Seasonal  "allergies     Varicella     Wears glasses        Social History     Tobacco Use    Smoking status: Never    Smokeless tobacco: Never   Substance Use Topics    Alcohol use: Not Currently         Objective:  Visit Vitals  /82   Pulse 93   Resp 18   Ht 167.6 cm (65.98\") Comment: pt reported   Wt 95.3 kg (210 lb 3.2 oz)   LMP 10/23/2023   SpO2 99%   BMI 33.94 kg/m²       Physical Exam  Vitals reviewed.   Constitutional:       Appearance: She is well-developed.   HENT:      Head: Atraumatic.      Mouth/Throat:      Mouth: Mucous membranes are moist.      Comments: Oropharynx was crowded.  Eyes:      Pupils: Pupils are equal, round, and reactive to light.   Neck:      Thyroid: No thyromegaly.      Vascular: No JVD.      Trachea: No tracheal deviation.   Cardiovascular:      Rate and Rhythm: Normal rate and regular rhythm.   Pulmonary:      Effort: Pulmonary effort is normal. No respiratory distress.      Breath sounds: Normal breath sounds. No wheezing.   Musculoskeletal:      Right lower leg: No edema.      Left lower leg: No edema.      Comments: Gait was normal.   Skin:     General: Skin is warm and dry.   Neurological:      Mental Status: She is alert and oriented to person, place, and time.   Psychiatric:         Mood and Affect: Mood normal.         Behavior: Behavior normal.         Assessment/Plan:  Diagnoses and all orders for this visit:    1. Obstructive sleep apnea (Primary)  -     Home Sleep Study; Future    2. Snoring  -     Home Sleep Study; Future    3. Excessive daytime sleepiness  -     Home Sleep Study; Future    4. Obesity (BMI 30-39.9)  -     Home Sleep Study; Future    5. Dream enactment behavior  -     Home Sleep Study; Future        Return in about 4 months (around 4/5/2024) for SleepONLY/[place], ...Also 14-15 mths w/[place], ....Also 23-24 mths w/ Dr. Avila.    DISCUSSION(if any):  Laboratory workup was also reviewed which showed   Lab Results   Component Value Date    CO2 24.9 11/22/2023   , "   Lab Results   Component Value Date    HGBA1C 5.00 05/18/2023     Laboratory workup also showed   Lab Results   Component Value Date    HGB 12.3 11/22/2023    HGB 11.8 (L) 06/28/2023    HGB 12.6 06/14/2023   ,   Lab Results   Component Value Date    HCT 36.8 11/22/2023    HCT 35.8 06/28/2023    HCT 37.3 06/14/2023   ,   Lab Results   Component Value Date    TSH 0.724 10/21/2022     Referring physicians office note was also reviewed that did mention daytime sleepiness & possible sleep apnea    ===========================  ===========================    Sleep questionnaire was provided to the patient    The pathophysiology of sleep apnea was discussed, with the patient.     We will encourage her to schedule the sleep study soon.     The patient was made aware of the limitation of the home sleep study, whereby it may underestimate the true AHI and also carries a low sensitivity.  I have informed her that even if the home sleep study is negative, we may suggest an in lab sleep study to completely and definitively rule out/in sleep apnea.  The patient has understood.  This was communicated to the patient, in case home study is to be requested.    The patient is agreeable to try CPAP/BiPAP, if needed.     Patient was educated on good sleep hygiene measures and voiced understanding of the same.     Patient was given reading material regarding sleep apnea    Patient was counseled regarding weight loss.       Dictated utilizing Dragon dictation.    This document was electronically signed by Mayda Avila MD on 11/29/23 at 09:47 EST

## 2023-11-29 NOTE — ANESTHESIA PREPROCEDURE EVALUATION
Anesthesia Evaluation     Patient summary reviewed and Nursing notes reviewed   no history of anesthetic complications:   NPO Solid Status: > 8 hours  NPO Liquid Status: > 8 hours           Airway   Mallampati: II  TM distance: >3 FB  Neck ROM: full  Difficult intubation highly probable, Possible difficult intubation and Large neck circumference  Dental - normal exam     Pulmonary     breath sounds clear to auscultation  (+) ,shortness of breath, sleep apnea, decreased breath sounds  (-) not a smoker  Cardiovascular     ECG reviewed  Rhythm: regular  Rate: normal    (+) hypertension, ALMENDAREZ      Neuro/Psych  (+) headaches, psychiatric history  GI/Hepatic/Renal/Endo    (+) obesity, morbid obesity, hiatal hernia, GERD, liver disease fatty liver diseaseDiabetes: inc gluc.    Musculoskeletal     (+) arthralgias, back pain, chronic pain, myalgias  Abdominal   (+) obese   Substance History   (-) alcohol use     OB/GYN          Other        ROS/Med Hx Other: Labs reviewed  EKG Normal sinus rhythm  Rightward axis  Borderline ECG  No previous ECGs available  Confirmed by MP BRISCOE MD ([id number]) on 5/20/2023 10:05:59 AM                Anesthesia Plan    ASA 3     general     (Risks and benefits discussed including risk of aspiration, recall and dental damage. All patient questions answered.    Will continue with plan of care.)  intravenous induction     Anesthetic plan, risks, benefits, and alternatives have been provided, discussed and informed consent has been obtained with: patient.  Pre-procedure education provided      CODE STATUS:

## 2023-11-30 ENCOUNTER — HOSPITAL ENCOUNTER (OUTPATIENT)
Facility: HOSPITAL | Age: 28
Setting detail: HOSPITAL OUTPATIENT SURGERY
Discharge: HOME | End: 2023-11-30
Attending: OBSTETRICS & GYNECOLOGY | Admitting: OBSTETRICS & GYNECOLOGY
Payer: COMMERCIAL

## 2023-11-30 ENCOUNTER — ANESTHESIA (OUTPATIENT)
Dept: PERIOP | Facility: HOSPITAL | Age: 28
End: 2023-11-30
Payer: COMMERCIAL

## 2023-11-30 VITALS
OXYGEN SATURATION: 96 % | RESPIRATION RATE: 16 BRPM | DIASTOLIC BLOOD PRESSURE: 84 MMHG | HEART RATE: 97 BPM | SYSTOLIC BLOOD PRESSURE: 131 MMHG | TEMPERATURE: 97.9 F

## 2023-11-30 DIAGNOSIS — N94.10 FEMALE DYSPAREUNIA: ICD-10-CM

## 2023-11-30 DIAGNOSIS — R10.2 CHRONIC PELVIC PAIN IN FEMALE: ICD-10-CM

## 2023-11-30 DIAGNOSIS — G89.29 CHRONIC PELVIC PAIN IN FEMALE: ICD-10-CM

## 2023-11-30 DIAGNOSIS — N80.9 ENDOMETRIOSIS DETERMINED BY LAPAROSCOPY: Primary | ICD-10-CM

## 2023-11-30 DIAGNOSIS — N94.6 DYSMENORRHEA: ICD-10-CM

## 2023-11-30 PROCEDURE — 25010000002 METHYLENE BLUE 1 % SOLUTION 10 ML VIAL: Performed by: OBSTETRICS & GYNECOLOGY

## 2023-11-30 PROCEDURE — 58662 LAPAROSCOPY EXCISE LESIONS: CPT | Performed by: OBSTETRICS & GYNECOLOGY

## 2023-11-30 PROCEDURE — S0260 H&P FOR SURGERY: HCPCS | Performed by: OBSTETRICS & GYNECOLOGY

## 2023-11-30 PROCEDURE — 25010000002 METOCLOPRAMIDE PER 10 MG: Performed by: NURSE ANESTHETIST, CERTIFIED REGISTERED

## 2023-11-30 PROCEDURE — 25010000002 PROPOFOL 10 MG/ML EMULSION: Performed by: NURSE ANESTHETIST, CERTIFIED REGISTERED

## 2023-11-30 PROCEDURE — 25810000003 LACTATED RINGERS PER 1000 ML: Performed by: OBSTETRICS & GYNECOLOGY

## 2023-11-30 PROCEDURE — 25010000002 ONDANSETRON PER 1 MG: Performed by: NURSE ANESTHETIST, CERTIFIED REGISTERED

## 2023-11-30 PROCEDURE — 25010000002 SUGAMMADEX 200 MG/2ML SOLUTION: Performed by: NURSE ANESTHETIST, CERTIFIED REGISTERED

## 2023-11-30 PROCEDURE — 25010000002 FENTANYL CITRATE (PF) 50 MCG/ML SOLUTION: Performed by: NURSE ANESTHETIST, CERTIFIED REGISTERED

## 2023-11-30 PROCEDURE — 81025 URINE PREGNANCY TEST: CPT | Performed by: OBSTETRICS & GYNECOLOGY

## 2023-11-30 PROCEDURE — 25810000003 LACTATED RINGERS PER 1000 ML: Performed by: NURSE ANESTHETIST, CERTIFIED REGISTERED

## 2023-11-30 PROCEDURE — 25010000002 MIDAZOLAM PER 1MG: Performed by: NURSE ANESTHETIST, CERTIFIED REGISTERED

## 2023-11-30 PROCEDURE — 58350 REOPEN FALLOPIAN TUBE: CPT | Performed by: OBSTETRICS & GYNECOLOGY

## 2023-11-30 PROCEDURE — 25010000002 DEXAMETHASONE PER 1 MG: Performed by: NURSE ANESTHETIST, CERTIFIED REGISTERED

## 2023-11-30 DEVICE — SEAL HEMO SURG ARISTA/AH ABS/PWDR 5GM: Type: IMPLANTABLE DEVICE | Site: ABDOMEN | Status: FUNCTIONAL

## 2023-11-30 RX ORDER — ONDANSETRON 2 MG/ML
4 INJECTION INTRAMUSCULAR; INTRAVENOUS ONCE
Status: DISCONTINUED | OUTPATIENT
Start: 2023-11-30 | End: 2023-11-30 | Stop reason: HOSPADM

## 2023-11-30 RX ORDER — ACETAMINOPHEN 500 MG
1000 TABLET ORAL EVERY 8 HOURS PRN
Qty: 60 TABLET | Refills: 0 | Status: SHIPPED | OUTPATIENT
Start: 2023-11-30 | End: 2024-11-29

## 2023-11-30 RX ORDER — LORAZEPAM 2 MG/ML
1 INJECTION INTRAMUSCULAR ONCE
Status: DISCONTINUED | OUTPATIENT
Start: 2023-11-30 | End: 2023-11-30 | Stop reason: HOSPADM

## 2023-11-30 RX ORDER — IBUPROFEN 600 MG/1
600 TABLET ORAL EVERY 6 HOURS PRN
Status: DISCONTINUED | OUTPATIENT
Start: 2023-11-30 | End: 2023-11-30 | Stop reason: HOSPADM

## 2023-11-30 RX ORDER — ONDANSETRON 2 MG/ML
INJECTION INTRAMUSCULAR; INTRAVENOUS AS NEEDED
Status: DISCONTINUED | OUTPATIENT
Start: 2023-11-30 | End: 2023-11-30 | Stop reason: SURG

## 2023-11-30 RX ORDER — OXYCODONE HYDROCHLORIDE 5 MG/1
5 TABLET ORAL EVERY 6 HOURS PRN
Qty: 5 TABLET | Refills: 0 | Status: SHIPPED | OUTPATIENT
Start: 2023-11-30

## 2023-11-30 RX ORDER — IPRATROPIUM BROMIDE AND ALBUTEROL SULFATE 2.5; .5 MG/3ML; MG/3ML
3 SOLUTION RESPIRATORY (INHALATION) ONCE
Status: DISCONTINUED | OUTPATIENT
Start: 2023-11-30 | End: 2023-11-30 | Stop reason: HOSPADM

## 2023-11-30 RX ORDER — PROPOFOL 10 MG/ML
VIAL (ML) INTRAVENOUS AS NEEDED
Status: DISCONTINUED | OUTPATIENT
Start: 2023-11-30 | End: 2023-11-30 | Stop reason: SURG

## 2023-11-30 RX ORDER — SODIUM CHLORIDE 0.9 % (FLUSH) 0.9 %
3 SYRINGE (ML) INJECTION EVERY 12 HOURS SCHEDULED
Status: DISCONTINUED | OUTPATIENT
Start: 2023-11-30 | End: 2023-11-30 | Stop reason: HOSPADM

## 2023-11-30 RX ORDER — IBUPROFEN 800 MG/1
800 TABLET ORAL EVERY 8 HOURS PRN
Qty: 30 TABLET | Refills: 0 | Status: SHIPPED | OUTPATIENT
Start: 2023-11-30

## 2023-11-30 RX ORDER — SODIUM CHLORIDE 9 MG/ML
40 INJECTION, SOLUTION INTRAVENOUS AS NEEDED
Status: DISCONTINUED | OUTPATIENT
Start: 2023-11-30 | End: 2023-11-30 | Stop reason: HOSPADM

## 2023-11-30 RX ORDER — LABETALOL HYDROCHLORIDE 5 MG/ML
INJECTION, SOLUTION INTRAVENOUS AS NEEDED
Status: DISCONTINUED | OUTPATIENT
Start: 2023-11-30 | End: 2023-11-30 | Stop reason: SURG

## 2023-11-30 RX ORDER — SODIUM CHLORIDE, SODIUM LACTATE, POTASSIUM CHLORIDE, CALCIUM CHLORIDE 600; 310; 30; 20 MG/100ML; MG/100ML; MG/100ML; MG/100ML
1000 INJECTION, SOLUTION INTRAVENOUS CONTINUOUS
Status: DISCONTINUED | OUTPATIENT
Start: 2023-11-30 | End: 2023-11-30 | Stop reason: HOSPADM

## 2023-11-30 RX ORDER — FENTANYL CITRATE 50 UG/ML
INJECTION, SOLUTION INTRAMUSCULAR; INTRAVENOUS AS NEEDED
Status: DISCONTINUED | OUTPATIENT
Start: 2023-11-30 | End: 2023-11-30 | Stop reason: SURG

## 2023-11-30 RX ORDER — SODIUM CHLORIDE, SODIUM LACTATE, POTASSIUM CHLORIDE, CALCIUM CHLORIDE 600; 310; 30; 20 MG/100ML; MG/100ML; MG/100ML; MG/100ML
INJECTION, SOLUTION INTRAVENOUS CONTINUOUS PRN
Status: DISCONTINUED | OUTPATIENT
Start: 2023-11-30 | End: 2023-11-30 | Stop reason: SURG

## 2023-11-30 RX ORDER — SODIUM CHLORIDE 0.9 % (FLUSH) 0.9 %
10 SYRINGE (ML) INJECTION AS NEEDED
Status: DISCONTINUED | OUTPATIENT
Start: 2023-11-30 | End: 2023-11-30 | Stop reason: HOSPADM

## 2023-11-30 RX ORDER — MAGNESIUM HYDROXIDE 1200 MG/15ML
LIQUID ORAL AS NEEDED
Status: DISCONTINUED | OUTPATIENT
Start: 2023-11-30 | End: 2023-11-30 | Stop reason: HOSPADM

## 2023-11-30 RX ORDER — MIDAZOLAM HYDROCHLORIDE 2 MG/2ML
2 INJECTION, SOLUTION INTRAMUSCULAR; INTRAVENOUS ONCE
Status: COMPLETED | OUTPATIENT
Start: 2023-11-30 | End: 2023-11-30

## 2023-11-30 RX ORDER — MIDAZOLAM HYDROCHLORIDE 2 MG/2ML
INJECTION, SOLUTION INTRAMUSCULAR; INTRAVENOUS AS NEEDED
Status: DISCONTINUED | OUTPATIENT
Start: 2023-11-30 | End: 2023-11-30 | Stop reason: SURG

## 2023-11-30 RX ORDER — METHYLENE BLUE 10 MG/ML
INJECTION INTRAVENOUS AS NEEDED
Status: DISCONTINUED | OUTPATIENT
Start: 2023-11-30 | End: 2023-11-30 | Stop reason: HOSPADM

## 2023-11-30 RX ORDER — HYDROCODONE BITARTRATE AND ACETAMINOPHEN 5; 325 MG/1; MG/1
1 TABLET ORAL ONCE AS NEEDED
Status: DISCONTINUED | OUTPATIENT
Start: 2023-11-30 | End: 2023-11-30 | Stop reason: HOSPADM

## 2023-11-30 RX ORDER — KETAMINE HCL IN NACL, ISO-OSM 100MG/10ML
SYRINGE (ML) INJECTION AS NEEDED
Status: DISCONTINUED | OUTPATIENT
Start: 2023-11-30 | End: 2023-11-30 | Stop reason: SURG

## 2023-11-30 RX ORDER — DEXAMETHASONE SODIUM PHOSPHATE 4 MG/ML
INJECTION, SOLUTION INTRA-ARTICULAR; INTRALESIONAL; INTRAMUSCULAR; INTRAVENOUS; SOFT TISSUE AS NEEDED
Status: DISCONTINUED | OUTPATIENT
Start: 2023-11-30 | End: 2023-11-30 | Stop reason: SURG

## 2023-11-30 RX ORDER — ROCURONIUM BROMIDE 50 MG/5 ML
SYRINGE (ML) INTRAVENOUS AS NEEDED
Status: DISCONTINUED | OUTPATIENT
Start: 2023-11-30 | End: 2023-11-30 | Stop reason: SURG

## 2023-11-30 RX ORDER — METOCLOPRAMIDE HYDROCHLORIDE 5 MG/ML
INJECTION INTRAMUSCULAR; INTRAVENOUS AS NEEDED
Status: DISCONTINUED | OUTPATIENT
Start: 2023-11-30 | End: 2023-11-30 | Stop reason: SURG

## 2023-11-30 RX ADMIN — PROPOFOL 150 MG: 10 INJECTION, EMULSION INTRAVENOUS at 11:03

## 2023-11-30 RX ADMIN — Medication 25 MG: at 10:58

## 2023-11-30 RX ADMIN — GLYCOPYRROLATE 0.2 MCG: 0.2 INJECTION, SOLUTION INTRAMUSCULAR; INTRAVENOUS at 10:48

## 2023-11-30 RX ADMIN — SODIUM CHLORIDE, POTASSIUM CHLORIDE, SODIUM LACTATE AND CALCIUM CHLORIDE: 600; 310; 30; 20 INJECTION, SOLUTION INTRAVENOUS at 11:37

## 2023-11-30 RX ADMIN — FENTANYL CITRATE 50 MCG: 50 INJECTION, SOLUTION INTRAMUSCULAR; INTRAVENOUS at 10:48

## 2023-11-30 RX ADMIN — SODIUM CHLORIDE, POTASSIUM CHLORIDE, SODIUM LACTATE AND CALCIUM CHLORIDE 1000 ML: 600; 310; 30; 20 INJECTION, SOLUTION INTRAVENOUS at 10:07

## 2023-11-30 RX ADMIN — FENTANYL CITRATE 50 MCG: 50 INJECTION, SOLUTION INTRAMUSCULAR; INTRAVENOUS at 10:58

## 2023-11-30 RX ADMIN — ONDANSETRON 4 MG: 2 INJECTION INTRAMUSCULAR; INTRAVENOUS at 11:15

## 2023-11-30 RX ADMIN — LABETALOL HYDROCHLORIDE 5 MG: 5 INJECTION, SOLUTION INTRAVENOUS at 11:14

## 2023-11-30 RX ADMIN — LABETALOL HYDROCHLORIDE 5 MG: 5 INJECTION, SOLUTION INTRAVENOUS at 11:17

## 2023-11-30 RX ADMIN — SODIUM CHLORIDE, POTASSIUM CHLORIDE, SODIUM LACTATE AND CALCIUM CHLORIDE: 600; 310; 30; 20 INJECTION, SOLUTION INTRAVENOUS at 10:22

## 2023-11-30 RX ADMIN — MIDAZOLAM HYDROCHLORIDE 2 MG: 1 INJECTION, SOLUTION INTRAMUSCULAR; INTRAVENOUS at 10:48

## 2023-11-30 RX ADMIN — SUGAMMADEX 200 MG: 100 INJECTION, SOLUTION INTRAVENOUS at 11:46

## 2023-11-30 RX ADMIN — Medication 30 MG: at 11:03

## 2023-11-30 RX ADMIN — MIDAZOLAM HYDROCHLORIDE 2 MG: 1 INJECTION, SOLUTION INTRAMUSCULAR; INTRAVENOUS at 10:28

## 2023-11-30 RX ADMIN — Medication 25 MG: at 11:04

## 2023-11-30 RX ADMIN — Medication 10 MG: at 11:02

## 2023-11-30 RX ADMIN — SODIUM CHLORIDE, POTASSIUM CHLORIDE, SODIUM LACTATE AND CALCIUM CHLORIDE: 600; 310; 30; 20 INJECTION, SOLUTION INTRAVENOUS at 11:20

## 2023-11-30 RX ADMIN — METOCLOPRAMIDE 5 MG: 5 INJECTION, SOLUTION INTRAMUSCULAR; INTRAVENOUS at 10:49

## 2023-11-30 RX ADMIN — LIDOCAINE HYDROCHLORIDE 40 MG: 20 INJECTION, SOLUTION INTRAVENOUS at 11:02

## 2023-11-30 RX ADMIN — DEXAMETHASONE SODIUM PHOSPHATE 8 MG: 4 INJECTION, SOLUTION INTRA-ARTICULAR; INTRALESIONAL; INTRAMUSCULAR; INTRAVENOUS; SOFT TISSUE at 11:15

## 2023-11-30 NOTE — OP NOTE
Ben Monte  : 1995  MRN: [id number]  CSN: [id number]  Date: 2023    Operative Report    Pre-op Diagnosis:  Chronic pelvic pain  Dysmenorrhea  Dyspareunia  Infertility   Post-op Diagnosis:  Same  Endometriosis  Left ovarian cyst, likely hemorrhagic   Procedure: Diagnostic laparoscopy  Ablation of endometriosis  Chromopertubation  Left ovarian cystectomy   Surgeon:  Surgical assistant: MARY GRACE Shine was responsible for performing the following activities: Retraction, Placing Dressing, and manipulation of laparoscopic instruments  and their skilled assistance was necessary for the success of this case.     OR Staff: Circulator: Valdez Durán RN; Ree Orellana RN  Scrub Person: Aiyana Nath PCT     Anesthesia: General   Estimated Blood Loss: 10 mls   ABx: none   Specimens:  Left ovarian cyst       Complications: None         Findings:   Numerous small endometriosis implants noted throughout the pelvis with the majority of disease burden located posteriorly along the cul-de-sac, sidewalls and uterosacral ligaments.  Tubal patency was confirmed with chromopertubation.  Normal uterus and right ovary.  The left ovary contained a small 2-3 cm hemorrhagic cyst that was resected.    Description of Procedure:   After the appropriate time out and adequate dosing of her anesthesia, the patient was prepped and draped in the usual sterile fashion. The patient was placed in the dorsal lithotomy position using Felipe stirrups. A tracey catheter had been placed in the bladder for drainage during the procedure. A sponge stick was placed in the vagina for uterine manipulation. The manipulator was covered over with a sterile towel.     Attention was then turned to the abdomen. An infraumbilical skin incision was made with the scalpel and a 5 mm trocar was inserted under direct visualization without any complications. The abdomen was insufflated with CO2 being sure to  keep the pressure less than 15 mmHg. The patient was placed in Trendelenburg position. Two 5 mm trocars were then inserted on the patient's left side with the aid of transillumination and after identification of the inferior epigastric vessels. The ports were placed under direct visualization without any complications and all entry sites were inspected and found to be atraumatic. The abdomen and pelvis were then explored with the above findings noted.     The monopolar hook was used to ablate numerous endometriosis implants.  The ureters were identified bilaterally and noted to be safely away from ablation sites.  Chromopertubation was performed with methylene blue solution.  Both tubes were noted to be patent.  The left ovary was elevated with an atraumatic grasper and incised with the monopolar hook.  The hemorrhagic cyst was peeled away from the normal ovarian tissue using [place] graspers.  The specimen was labeled and sent to pathology for review.  Hemostasis was achieved with Surgicel powder.  Adequate hemostasis was noted at all surgical sites.  The pelvis was irrigated and suctioned.  All instruments were removed from the patient and all trocars were removed under direct visualization. The pneumoperitoneum was evacuated and the skin incisions were made hemostatic with the Bovie. All skin incisions were closed with a 3-0 Monocryl suture in a subcuticular fashion. Steri-Strips were applied. All counts were correct at the end of the procedure.  There were no complications. The patient tolerated the procedure well.  She was taken to the postoperative recovery room in stable condition.    Toribio Garcia MD  Obstetrics and Gynecology  [facility]

## 2023-11-30 NOTE — H&P
GYNECOLOGY HISTORY AND PHYSICAL    CHIEF COMPLAINT: Scheduled surgery    DIAGNOSIS:  Chronic pelvic pain  Dysmenorrhea  Dyspareunia  Infertility    ASSESSMENT/PLAN     28 y.o.  female who presents for scheduled diagnostic laparoscopy, possible ablation of endometriosis and all other indicated procedures.    - Proceed to the OR for scheduled surgery  - Risks for the procedure reviewed  - SCDs for DVT ppx  - Antibiotics: none    HISTORY OF PRESENT ILLNESS     28 y.o.  female who presents for the scheduled procedure listed above.    She has no complaints today and there are no interval changes to the history.    REVIEW OF SYSTEMS: A complete review of systems was performed and was specifically negative for headache, changes in vision, RUQ pain, shortness of breath, chest pain, lower extremity edema and dysuria.     HISTORY:  Obstetrical History:  OB History    Para Term  AB Living   0 0 0 0 0 0   SAB IAB Ectopic Molar Multiple Live Births   0 0 0 0 0 0       Past Medical History:  Past Medical History:   Diagnosis Date    Anemia     Anxiety 6 months ago    Female infertility 2022    not officially been diagnosed with infertility just going based off of no pregnancy occurring after trying for a year plus    GERD (gastroesophageal reflux disease)     Headache     Hernia 2018    Hiatal    Hydradenitis supp     Low back pain     MRSA infection     , under breasts due to HS    PMS (premenstrual syndrome) 2008    Seasonal allergies     Varicella     Wears glasses        Past Surgical History:  Past Surgical History:   Procedure Laterality Date    ADENOIDECTOMY  2006    ENDOSCOPY  2019    Dr. Feliciano- 1 cm hiatal hernia, mild esophagitis, bile aspirate taken for crystal analysis and negative. No other path available.    ENDOSCOPY N/A 2023    Procedure: ESOPHAGOGASTRODUODENOSCOPY WITH BIOPSY ;  Surgeon: Garo Limon MD;  Location: [facility] ENDOSCOPY;  Service:  Gastroenterology;  Laterality: N/A;    KNEE SURGERY Left     ACL & meniscus repair- has screws implanted    LUMBAR DISCECTOMY Right 05/25/2023    Procedure: LUMBAR FORAMINOTOMY RIGHT L5-S1;  Surgeon: Daniel Garibay MD;  Location: [facility];  Service: Neurosurgery;  Laterality: Right;    OTOPLASTY Bilateral 2001    SKIN BIOPSY      TONSILLECTOMY  2006    WISDOM TOOTH EXTRACTION  2020       Social History:  Social History     Tobacco Use    Smoking status: Never    Smokeless tobacco: Never   Vaping Use    Vaping Use: Never used   Substance Use Topics    Alcohol use: Not Currently    Drug use: Never       Family History  Family History   Problem Relation Age of Onset    No Known Problems Mother     Hypertension Father     Hyperlipidemia Father     Cancer Maternal Grandmother     Cancer Paternal Grandmother     Breast cancer Paternal Grandmother     Prostate cancer Paternal Grandfather         Allergies:   Allergies   Allergen Reactions    Eggs Or Egg-Derived Products Other (See Comments)     Sneezing       OBJECTIVE   VITALS:  Temp:  [98.1 °F (36.7 °C)] 98.1 °F (36.7 °C)  Heart Rate:  [105] 105  Resp:  [18] 18    PHYSICAL EXAM:  GENERAL: NAD, alert  CHEST: No increased work of breathing, CTAB  CV: RRR, WWP  ABDOMEN: Soft, NTTP  EXTREMITIES:  Warm and well-perfused, nontender, nonedematous  NEURO: AAO x 3, CN II-XII grossly intact    No current facility-administered medications on file prior to encounter.     Current Outpatient Medications on File Prior to Encounter   Medication Sig Dispense Refill    clindamycin (CLINDAGEL) 1 % gel Apply 1 application  topically to the appropriate area as directed Daily. Apply topically to the affected area twice daily (Patient not taking: Reported on 11/29/2023)      famotidine (Pepcid) 40 MG tablet Take 1 tablet by mouth Every Night. 90 tablet 1    gabapentin (NEURONTIN) 300 MG capsule Take 1 capsule by mouth 3 (Three) Times a Day. 90 capsule 5    gentamicin (GARAMYCIN) 0.1 %  "cream Apply 1 application  topically to the appropriate area as directed As Needed. Apply topically to the affected area twice daily (Patient not taking: Reported on 11/29/2023)      Loratadine 10 MG capsule Take 1 capsule by mouth Daily. (Patient not taking: Reported on 11/29/2023)      omeprazole (priLOSEC) 40 MG capsule Take 1 capsule by mouth Daily. (Patient not taking: Reported on 11/29/2023) 90 capsule 1    sertraline (ZOLOFT) 50 MG tablet Take 1 tablet by mouth Daily. 90 tablet 3       DIAGNOSTIC STUDIES:        Invalid input(s): \"LABALB\", \"UA\"    No results found for this or any previous visit (from the past 24 hour(s)).    Toribio Garcia MD  Obstetrics and Gynecology  [facility]     "

## 2023-11-30 NOTE — DISCHARGE INSTRUCTIONS
Pelvic Surgery  Home Care Instructions:  Dr. Toribio Garcia      Thank you for choosing [facility]. Please let us know if you have questions or concerns or do not understand the information we give you. Always ask us to explain words or phrases you do not understand.    You have had pelvic surgery. Here are some basic guidelines to help you recover more quickly at home. Your doctor may give you more guidelines. If you have any questions after you go home, please call the numbers listed on the next page.    General Guidelines    1. You may resume normal daily activities.  2. Do not put anything in the vagina (no tampons or douching).    3.   Do not have sex until cleared by your physician.  4.   You may climb steps.  5. You may bathe or shower.  6. The only exercise you should do is walking. This is important for your recovery.  7. Do not drive while taking narcotics.  8. Take the medicines you were taking before surgery. Other medicines you need to take at home are:    Alternate Motrin and Tylenol around the clock. Take each every 8 hours in alternation so that you are getting one of the medications every 4 hours.   Roxicodone 5mg tab  - One tablet by mouth every 4-6 hours as needed for breakthrough pain   Metamucil + Colace daily to keep bowel movements soft        If you have questions about your medicines, let us know. Or, talk to your local pharmacist.    Surgery, lack of activity, pain medicines and iron pills tend to cause constipation. Take something every day to prevent constipation and straining.  Taking something like Metamucil® every day to increase fiber may prevent constipation. Follow the instructions on the container. If you need a gentle laxative, then something like Milk of Magnesia® or Correctol® work fairly well. You should not need to take laxatives often.    10. Call your doctor if you have:     a. Fever of 101 degrees or higher.  b. Heavy vaginal bleeding.  c. Increased redness  around your incision (cut); incision pulling apart; drainage (pus), bleeding, or a large amount of fluid from your incision.  d. Worsening nausea or pain.  e. Other problems or concern.    If you have any questions or concerns about your usual routines, please talk to the nurse or doctor.       To talk with your doctor at [facility], call:  Obstetrics and Gynecology Outpatient Clinic  Phone: [phone]      We appreciate the opportunity you have given us to participate in your care.     No pushing, pulling, tugging,  heavy lifting, or strenuous activity.  No major decision making, driving, or drinking alcoholic beverages for 24 hours. ( due to the medications you have  received)  Always use good hand hygiene/washing techniques.  NO driving while taking pain medications.    * if you have an incision:  Check your incision area every day for signs of infection.   Check for:  * more redness, swelling, or pain  *more fluid or blood  *warmth  *pus or bad smellTo assist you in voiding:  Drink plenty of fluids  Listen to running water while attempting to void.    If you are unable to urinate and you have an uncomfortable urge to void or it has been   6 hours since you were discharged, return to the Emergency Room

## 2023-11-30 NOTE — ANESTHESIA POSTPROCEDURE EVALUATION
Patient: Bertha Monte    Procedure Summary       Date: 11/30/23 Room / Location: [facility] OR  /  [facility] OR    Anesthesia Start: 1047 Anesthesia Stop: 1156    Procedure: DIAGNOSTIC LAPAROSCOPY, POSSIBLE ABLATION OF ENDOMETRIOSIS, AND LEFT OVARIAN CYSTECTOMY (Abdomen) Diagnosis:       Dysmenorrhea      Female dyspareunia      Chronic pelvic pain in female      (Dysmenorrhea [N94.6])      (Female dyspareunia [N94.10])      (Chronic pelvic pain in female [R10.2, G89.29])    Surgeons: Toribio Garcia MD Provider: Ryne Rollins CRNA    Anesthesia Type: general ASA Status: 3            Anesthesia Type: general    Vitals  Vitals Value Taken Time   /79 11/30/23 1245   Temp 99.3 °F (37.4 °C) 11/30/23 1200   Pulse 76 11/30/23 1258   Resp 13 11/30/23 1230   SpO2 100 % 11/30/23 1258   Vitals shown include unfiled device data.        Post Anesthesia Care and Evaluation    Patient location during evaluation: PACU  Patient participation: complete - patient participated  Level of consciousness: awake and alert  Pain score: 1  Pain management: adequate    Airway patency: patent  Anesthetic complications: No anesthetic complications  PONV Status: none  Cardiovascular status: acceptable  Respiratory status: acceptable  Hydration status: acceptable  No anesthesia care post op

## 2023-11-30 NOTE — LETTER
November 30, 2023     Patient: Bertha Monte   YOB: 1995   Date of Visit: 11/30/2023       To Whom It May Concern:    It is my medical opinion that Bertha Monte may return to work on   December 2 2023 due to having a medical procedure on 11/30/2023 at [facility].

## 2023-11-30 NOTE — ANESTHESIA PROCEDURE NOTES
Airway  Urgency: elective    Date/Time: 11/30/2023 11:04 AM  Airway not difficult    General Information and Staff    Patient location during procedure: OR  CRNA/CAA: Ryne Rollins CRNA    Indications and Patient Condition  Indications for airway management: airway protection    Preoxygenated: yes  Mask difficulty assessment: 1 - vent by mask    Final Airway Details  Final airway type: endotracheal airway      Successful airway: ETT  Cuffed: yes   Successful intubation technique: direct laryngoscopy and video laryngoscopy  Facilitating devices/methods: intubating stylet and anterior pressure/BURP  Endotracheal tube insertion site: oral  Blade: Glidescope (small blade)  Blade size: 3  ETT size (mm): 7.5  Cormack-Lehane Classification: grade IIa - partial view of glottis  Placement verified by: chest auscultation, capnometry and palpation of cuff   Inital cuff pressure (cm H2O): 0  Cuff volume (mL): 5  Measured from: lips  ETT/EBT  to lips (cm): 21  Number of attempts at approach: 1  Assessment: lips, teeth, and gum same as pre-op and atraumatic intubation    Additional Comments  Intubated by dvnt, cuff up with mov, bbs and expansion =, + etco, taped at lips, tolerated induction and intubation without adverse rx.

## 2023-12-02 LAB — REF LAB TEST METHOD: NORMAL

## 2023-12-07 ENCOUNTER — OFFICE VISIT (OUTPATIENT)
Dept: OBSTETRICS AND GYNECOLOGY | Facility: CLINIC | Age: 28
End: 2023-12-07
Payer: COMMERCIAL

## 2023-12-07 VITALS
SYSTOLIC BLOOD PRESSURE: 124 MMHG | WEIGHT: 210 LBS | BODY MASS INDEX: 33.75 KG/M2 | HEIGHT: 66 IN | DIASTOLIC BLOOD PRESSURE: 80 MMHG

## 2023-12-07 DIAGNOSIS — N80.9 ENDOMETRIOSIS DETERMINED BY LAPAROSCOPY: Primary | ICD-10-CM

## 2023-12-07 PROCEDURE — 99024 POSTOP FOLLOW-UP VISIT: CPT | Performed by: OBSTETRICS & GYNECOLOGY

## 2023-12-13 NOTE — PROGRESS NOTES
"Postoperative Assessment Visit    Subjective   Chief Complaint   Patient presents with    Post-op Follow-up     No Complaints/concerns        28 y.o.  female who presents for a post-op visit.    Pre-op Diagnosis:  Chronic pelvic pain  Dysmenorrhea  Dyspareunia  Infertility   Post-op Diagnosis:  Same  Endometriosis  Left ovarian cyst, likely hemorrhagic   Procedure: Diagnostic laparoscopy  Ablation of endometriosis  Chromopertubation  Left ovarian cystectomy     The patient is doing well with no issues.     Objective   Vitals:    23 1327   BP: 124/80   Weight: 95.3 kg (210 lb)   Height: 167.6 cm (66\")     Physical Exam:  Incision(s): Well-healing, no signs of infection or separation  Reassuring postoperative exam       Medical Decision Making:    I have reviewed the operative note.  I have reviewed the postoperative labs where appropriate.    Assessment   Endometriosis s/p laparoscopy  Post-op evaluation  Desire for pregnancy     Plan    No orders of the defined types were placed in this encounter.      Medication Management: None    Patient is meeting all post-op milestones.  Pathology reviewed - benign hemorrhagic left ovarian cyst  Surgical findings discussed.  Postoperative precautions and restrictions reviewed.  We reviewed medical treatment options for endometriosis.  She would like to hold off on these for now.  She will resume timed intercourse and attempts at pregnancy.  Her partner will complete a semen analysis in the near future.    RTC as needed.    Toribio Garcia MD  Obstetrics and Gynecology  [facility]    " Monitor on current medications Monitor on current medications Monitor on current medications Monitor on current medications Metoprolol for now  will add other home meds as needed (amlodipine, hydralazine) Monitor on current medications Monitor on current medications Monitor on current medications Monitor on current medications Monitor on current medications Monitor on current medications Monitor on current medications Monitor on current medications Monitor on current medications Monitor on current medications Monitor on current medications Metoprolol for now  will add other home meds as needed (amlodipine, hydralazine)

## 2023-12-17 DIAGNOSIS — K21.9 GASTROESOPHAGEAL REFLUX DISEASE, UNSPECIFIED WHETHER ESOPHAGITIS PRESENT: ICD-10-CM

## 2023-12-18 RX ORDER — FAMOTIDINE 40 MG/1
40 TABLET, FILM COATED ORAL
Qty: 90 TABLET | Refills: 1 | Status: SHIPPED | OUTPATIENT
Start: 2023-12-18

## 2024-01-30 ENCOUNTER — HOSPITAL ENCOUNTER (OUTPATIENT)
Dept: SLEEP MEDICINE | Facility: HOSPITAL | Age: 29
Discharge: HOME | End: 2024-01-30
Admitting: INTERNAL MEDICINE
Payer: COMMERCIAL

## 2024-01-30 DIAGNOSIS — G47.52 DREAM ENACTMENT BEHAVIOR: ICD-10-CM

## 2024-01-30 DIAGNOSIS — G47.33 OBSTRUCTIVE SLEEP APNEA: ICD-10-CM

## 2024-01-30 DIAGNOSIS — R06.83 SNORING: ICD-10-CM

## 2024-01-30 DIAGNOSIS — G47.19 EXCESSIVE DAYTIME SLEEPINESS: ICD-10-CM

## 2024-01-30 DIAGNOSIS — E66.9 OBESITY (BMI 30-39.9): ICD-10-CM

## 2024-01-30 PROCEDURE — 95806 SLEEP STUDY UNATT&RESP EFFT: CPT

## 2024-02-02 PROCEDURE — 95806 SLEEP STUDY UNATT&RESP EFFT: CPT | Performed by: INTERNAL MEDICINE

## 2024-02-14 ENCOUNTER — OFFICE VISIT (OUTPATIENT)
Dept: PULMONOLOGY | Facility: CLINIC | Age: 29
End: 2024-02-14
Payer: COMMERCIAL

## 2024-02-14 VITALS
RESPIRATION RATE: 18 BRPM | HEART RATE: 74 BPM | BODY MASS INDEX: 32.3 KG/M2 | OXYGEN SATURATION: 99 % | WEIGHT: 201 LBS | HEIGHT: 66 IN | SYSTOLIC BLOOD PRESSURE: 122 MMHG | DIASTOLIC BLOOD PRESSURE: 76 MMHG

## 2024-02-14 DIAGNOSIS — G47.33 OBSTRUCTIVE SLEEP APNEA: Primary | ICD-10-CM

## 2024-02-14 DIAGNOSIS — E66.9 OBESITY (BMI 30-39.9): ICD-10-CM

## 2024-02-14 DIAGNOSIS — R06.83 SNORING: ICD-10-CM

## 2024-03-15 ENCOUNTER — OFFICE VISIT (OUTPATIENT)
Dept: INTERNAL MEDICINE | Facility: CLINIC | Age: 29
End: 2024-03-15
Payer: COMMERCIAL

## 2024-03-15 VITALS
HEIGHT: 66 IN | TEMPERATURE: 97.8 F | BODY MASS INDEX: 32.3 KG/M2 | SYSTOLIC BLOOD PRESSURE: 130 MMHG | DIASTOLIC BLOOD PRESSURE: 90 MMHG | HEART RATE: 81 BPM | OXYGEN SATURATION: 99 % | WEIGHT: 201 LBS

## 2024-03-15 DIAGNOSIS — F41.1 GENERALIZED ANXIETY DISORDER: Primary | ICD-10-CM

## 2024-03-15 PROBLEM — E66.9 OBESITY (BMI 30-39.9): Status: ACTIVE | Noted: 2024-03-15

## 2024-03-15 PROBLEM — N83.209 OVARIAN CYST: Status: ACTIVE | Noted: 2024-03-15

## 2024-03-15 PROCEDURE — 99214 OFFICE O/P EST MOD 30 MIN: CPT | Performed by: NURSE PRACTITIONER

## 2024-03-15 RX ORDER — SECUKINUMAB 300 MG/2ML
INJECTION SUBCUTANEOUS
COMMUNITY
Start: 2024-03-05

## 2024-03-15 NOTE — PROGRESS NOTES
"  Office Visit      Patient Name: Bertha Monte  : 1995   MRN: [id number]   Care Team: Patient Care Team:  Halina Quezada APRN as PCP - General (Family Medicine)  Sofya Tay LPCC as Counselor (Behavioral Health)    Chief Complaint  Anxiety (Discuss anxiety medication )    Subjective     Subjective      Bertha Monte presents to [facility] for increased anxiety.    Has been having increased anxiety for the past 3 month due to her job and trying to conceive. Works at [employer] as an  and has had an increase in workload.   Feels as her sertraline is not working as well as it used to. She has been on it for a year, and is still taking as prescribed. Endorses shorter temper. No adverse effects with sertraline previously.  Denies any panic attacks, nausea, vomiting, abdominal pain, headaches, insomnia, decreased appetite and suicidal or homicidal ideation.  She has tried counseling in the past for her mood but states it was not helpful.  Objective     Objective   Vital Signs:   /90   Pulse 81   Temp 97.8 °F (36.6 °C) (Infrared)   Ht 167.6 cm (66\")   Wt 91.2 kg (201 lb)   SpO2 99%   BMI 32.44 kg/m²         Physical Exam  Vitals and nursing note reviewed.   Constitutional:       General: She is not in acute distress.     Appearance: Normal appearance. She is obese. She is not toxic-appearing.   Eyes:      Pupils: Pupils are equal, round, and reactive to light.   Neck:      Vascular: No carotid bruit.   Cardiovascular:      Rate and Rhythm: Normal rate and regular rhythm.      Heart sounds: Normal heart sounds. No murmur heard.  Pulmonary:      Effort: Pulmonary effort is normal. No respiratory distress.      Breath sounds: Normal breath sounds. No wheezing.   Abdominal:      Palpations: Abdomen is soft.   Musculoskeletal:      Cervical back: Neck supple. No tenderness.   Skin:     General: Skin is warm and dry.      Findings: No " rash.   Neurological:      General: No focal deficit present.      Mental Status: She is alert and oriented to person, place, and time.   Psychiatric:         Mood and Affect: Mood is anxious.         Behavior: Behavior normal.          Assessment / Plan      Assessment & Plan   Problem List Items Addressed This Visit    None  Visit Diagnoses       Generalized anxiety disorder    -  Primary    Uncontrolled, increase sertraline to 75 mg nightly, if tolerating can consider going up to 100 mg.  Recommend stress management, deep breathing exercises, medication compliance, sleep hygiene measures, and will follow-up closely in 4 weeks.  Sooner if needed.          This note accurately reflects work and decisions made by me.    Follow Up   Return in about 4 weeks (around 4/12/2024) for Next scheduled follow up.  Patient was given instructions and counseling regarding her condition or for health maintenance advice. Please see specific information pulled into the AVS if appropriate.     VANCE Kaye  [facility]

## 2024-04-05 DIAGNOSIS — M54.16 LUMBAR RADICULOPATHY: ICD-10-CM

## 2024-04-05 RX ORDER — GABAPENTIN 300 MG/1
300 CAPSULE ORAL 3 TIMES DAILY
Qty: 90 CAPSULE | Refills: 0 | Status: SHIPPED | OUTPATIENT
Start: 2024-04-05

## 2024-04-05 NOTE — TELEPHONE ENCOUNTER
Provider:  [place]  Surgery/Procedure:  Lumbar Foraminotomy Right L5-S1  Surgery/Procedure Date:  5/25/23  Last visit:   11/22/23  Next visit: NA     Reason for call:  Refill request for gabapentin pending. Patient does not have any follow up appointments.    T[place]:    Pat ID Date Filled RX # Drug Name Prescriber Name Dispenser Name Qty Days MED PMT Rpt To  1 02/05/2024 [id number] Gabapentin 300MG [place] [facility]   [facility]  [address] [place]  Date Written New/Refill Dosage Form Prescriber [facility]  08/25/2023 Refill [facility]  Pat ID Date Filled RX # Drug Name Prescriber Name Dispenser Name Qty Days MED PMT Rpt To  1 12/21/2023 [id number] Gabapentin 300MG [place] [facility]   [facility]ACY, [facility]  [address] [place]  Date Written New/Refill Dosage Form Prescriber [facility]  08/25/2023 Refill [facility]  Pat ID Date Filled RX # Drug Name Prescriber Name Dispenser Name Qty Days MED PMT Rpt To  2 11/30/2023 [id number] Oxycodone Hydrochloride 5MG Toribio Garcia [facility]   PROFESSIONAL PH[facility]  [address] [place]  Date Written New/Refill Dosage Form Prescriber [facility]  11/30/2023 [facility]  Pat ID Date Filled RX # Drug Name Prescriber Name Dispenser Name Qty Days MED PMT Rpt To  1 11/12/2023 [id number] Gabapentin 300MG [facility]   PHARMACY, [facility]  [address] [place]  Date Written New/Refill Dosage Form Prescriber [facility]  08/25/2023 Refill [facility]

## 2024-04-09 RX ORDER — GABAPENTIN 300 MG/1
300 CAPSULE ORAL 3 TIMES DAILY
Qty: 90 CAPSULE | Refills: 0 | OUTPATIENT
Start: 2024-04-09

## 2024-04-12 ENCOUNTER — OFFICE VISIT (OUTPATIENT)
Dept: INTERNAL MEDICINE | Facility: CLINIC | Age: 29
End: 2024-04-12
Payer: COMMERCIAL

## 2024-04-12 VITALS
DIASTOLIC BLOOD PRESSURE: 78 MMHG | OXYGEN SATURATION: 99 % | BODY MASS INDEX: 32.31 KG/M2 | HEIGHT: 66 IN | SYSTOLIC BLOOD PRESSURE: 128 MMHG | HEART RATE: 78 BPM | TEMPERATURE: 98.7 F | WEIGHT: 201.04 LBS

## 2024-04-12 DIAGNOSIS — F41.1 GENERALIZED ANXIETY DISORDER: Primary | ICD-10-CM

## 2024-04-12 DIAGNOSIS — M54.16 LUMBAR RADICULOPATHY: ICD-10-CM

## 2024-04-12 DIAGNOSIS — Z98.890 STATUS POST LUMBAR SPINE SURGERY FOR DECOMPRESSION OF SPINAL CORD: ICD-10-CM

## 2024-04-12 PROCEDURE — 99214 OFFICE O/P EST MOD 30 MIN: CPT | Performed by: NURSE PRACTITIONER

## 2024-04-12 RX ORDER — CITALOPRAM 40 MG/1
40 TABLET ORAL DAILY
Qty: 30 TABLET | Refills: 1 | Status: SHIPPED | OUTPATIENT
Start: 2024-04-12

## 2024-04-12 NOTE — PROGRESS NOTES
"  Office Visit      Patient Name: Bertha Monte  : 1995   MRN: [id number]   Care Team: Patient Care Team:  Halina Quezada APRN as PCP - General (Family Medicine)  Sofya Tay LPCC as Counselor (Behavioral Health)    Chief Complaint  Anxiety (One month follow up.  Reports that she is still having increased anxiety. )    Subjective     Subjective      Bertha Monte presents to [facility] for anxiety follow up.    Last visit sertraline dose was increased but she does not feel as if it has made much of a difference. Slight improvement in racing and uncontrollable thoughts and panic attack severity. She still feels as if she has a short temper. No adverse effects with this medication. Sleeping okay.  She has seen a behavioral health counselor but did not feel as if it benefited her.  Denies suicidal or homicidal thoughts, nausea, dizziness.    History of spinal decompression surgery, mentions she has stopped following up with neurosurgery as she was not having any interventions done. She is inquiring about gabapentin refills for her right foot neuropathy.  Denies any weakness, falls, or worsening symptoms.  Historically has tolerated gabapentin well.    Objective     Objective   Vital Signs:   /78   Pulse 78   Temp 98.7 °F (37.1 °C) (Tympanic)   Ht 167.6 cm (66\")   Wt 91.2 kg (201 lb 0.6 oz)   SpO2 99%   BMI 32.45 kg/m²         Physical Exam  Vitals and nursing note reviewed.   Constitutional:       General: She is not in acute distress.     Appearance: Normal appearance. She is not toxic-appearing.   Eyes:      Pupils: Pupils are equal, round, and reactive to light.   Neck:      Vascular: No carotid bruit.   Cardiovascular:      Rate and Rhythm: Normal rate and regular rhythm.      Heart sounds: Normal heart sounds. No murmur heard.  Pulmonary:      Effort: Pulmonary effort is normal. No respiratory distress.      Breath sounds: Normal breath sounds. " No wheezing.   Abdominal:      General: Bowel sounds are normal. There is no distension.      Palpations: Abdomen is soft.      Tenderness: There is no abdominal tenderness.   Musculoskeletal:         General: Normal range of motion.      Cervical back: Neck supple. No tenderness.   Feet:      Comments: Decreased sensation to right foot  Skin:     General: Skin is warm and dry.      Findings: No rash.   Neurological:      General: No focal deficit present.      Mental Status: She is alert.   Psychiatric:         Mood and Affect: Mood normal.         Behavior: Behavior normal.        Assessment / Plan      Assessment & Plan   Problem List Items Addressed This Visit    None  Visit Diagnoses       Generalized anxiety disorder    -  Primary    Stop sertraline and start citalopram. Discussed no need for taper, monitor side effects. Continue coping mechanisms for anxiety, talk with supportive friends and family. Follow up in 6 weeks or sooner if needed.    Lumbar radiculopathy        Relevant Orders    Compliance Drug Analysis, Ur - Urine, Clean Catch    Encourage stretching and heat for pain, continue gabapentin for relief. No refill needed at this time.  Continues to benefit from use of the medication.   Again, educated on addictive nature of controlled substances and risk vs. Benefits discussion regarding medication took place today. A joint decision was made to continue the medication at lowest effective dose today.   JASSI obtained and appropriate. Previous UDS reviewed and appropriate, updated at today's visit.   Follow-up in 6 months for medication check.       Status post lumbar spine surgery for decompression of spinal cord        Relevant Orders    Compliance Drug Analysis, Ur - Urine, Clean Catch      See above.         This note accurately reflects work and decisions made by me.      Follow Up   Return in about 6 weeks (around 5/24/2024) for Next scheduled follow up.  Patient was given instructions and  counseling regarding her condition or for health maintenance advice. Please see specific information pulled into the AVS if appropriate.     VANCE Kaye  [facility]

## 2024-04-19 LAB — DRUGS UR: NORMAL

## 2024-05-14 ENCOUNTER — OFFICE VISIT (OUTPATIENT)
Dept: INTERNAL MEDICINE | Facility: CLINIC | Age: 29
End: 2024-05-14
Payer: COMMERCIAL

## 2024-05-14 VITALS
TEMPERATURE: 97.1 F | SYSTOLIC BLOOD PRESSURE: 122 MMHG | DIASTOLIC BLOOD PRESSURE: 74 MMHG | HEART RATE: 70 BPM | HEIGHT: 66 IN | OXYGEN SATURATION: 98 % | BODY MASS INDEX: 32.47 KG/M2 | WEIGHT: 202 LBS

## 2024-05-14 DIAGNOSIS — N92.6 LATE PERIOD: Primary | ICD-10-CM

## 2024-05-14 LAB — HCG INTACT+B SERPL-ACNC: <1 MIU/ML

## 2024-05-14 PROCEDURE — 99213 OFFICE O/P EST LOW 20 MIN: CPT | Performed by: NURSE PRACTITIONER

## 2024-05-14 NOTE — PROGRESS NOTES
Office Visit      Date: 2024   Patient Name: Bertha Monte  : 1995   MRN: [id number]     Chief Complaint:    Chief Complaint   Patient presents with    Late Period     Over a week    Possible Pregnancy     Home test have been negative, would like HCG blood test       History of Present Illness: Bertha Monte is a 28 y.o. female resents for serum hCG test.  Her cycles are typically 28 to 31 days.  Her last menstrual cycle was April 3.  She is currently on day 41 of her cycle.  No signs of menstrual cycle.  Home pregnancy tests negative.    Subjective      Review of Systems:   Pertinent ROS noted in HPI.     I have reviewed the patients family history, social history, past medical history, past surgical history and have updated it as appropriate.     Medications:     Current Outpatient Medications:     acetaminophen (TYLENOL) 500 MG tablet, Take 2 tablets by mouth Every 8 (Eight) Hours As Needed for Mild Pain., Disp: 60 tablet, Rfl: 0    citalopram (CeleXA) 40 MG tablet, Take 1 tablet by mouth Daily., Disp: 30 tablet, Rfl: 1    Cosentyx UnoReady 300 MG/2ML solution auto-injector, , Disp: , Rfl:     famotidine (PEPCID) 40 MG tablet, TAKE 1 TABLET BY MOUTH EVERY DAY AT NIGHT, Disp: 90 tablet, Rfl: 1    gabapentin (NEURONTIN) 300 MG capsule, TAKE 1 CAPSULE BY MOUTH THREE TIMES A DAY, Disp: 90 capsule, Rfl: 0    ibuprofen (ADVIL,MOTRIN) 800 MG tablet, Take 1 tablet by mouth Every 8 (Eight) Hours As Needed for Mild Pain for up to 30 doses., Disp: 30 tablet, Rfl: 0    lansoprazole (PREVACID) 30 MG capsule, Take 1 capsule by mouth Daily., Disp: , Rfl:     Allergies:   Allergies   Allergen Reactions    Egg-Derived Products Other (See Comments)     Sneezing       Objective     Physical Exam  Physical Exam  Vitals and nursing note reviewed.   Constitutional:       General: She is not in acute distress.     Appearance: Normal appearance.   HENT:      Right Ear: External ear normal.      Left Ear:  "External ear normal.   Eyes:      Extraocular Movements: Extraocular movements intact.   Cardiovascular:      Rate and Rhythm: Normal rate.   Pulmonary:      Effort: Pulmonary effort is normal. No respiratory distress.   Musculoskeletal:         General: Normal range of motion.      Cervical back: Normal range of motion.   Neurological:      Mental Status: She is alert and oriented to person, place, and time.   Psychiatric:         Mood and Affect: Mood normal.         Behavior: Behavior normal.         Thought Content: Thought content normal.         Judgment: Judgment normal.         Vital Signs:   Vitals:    05/14/24 0809   BP: 122/74   Pulse: 70   Temp: 97.1 °F (36.2 °C)   SpO2: 98%   Weight: 91.6 kg (202 lb)   Height: 167.6 cm (66\")     Body mass index is 32.6 kg/m².    Assessment / Plan      Assessment/Plan:   Problem List Items Addressed This Visit    None  Visit Diagnoses       Late period    -  Primary    Relevant Orders    HCG, B-subunit, Quantitative            Serum hCG ordered  Discussed other causes of late menstrual cycles     Follow Up:   Return for Next scheduled follow up.      Beth WOODARD  [facility]  "

## 2024-05-23 ENCOUNTER — OFFICE VISIT (OUTPATIENT)
Dept: OBSTETRICS AND GYNECOLOGY | Facility: CLINIC | Age: 29
End: 2024-05-23
Payer: COMMERCIAL

## 2024-05-23 VITALS
WEIGHT: 200.8 LBS | DIASTOLIC BLOOD PRESSURE: 74 MMHG | SYSTOLIC BLOOD PRESSURE: 118 MMHG | BODY MASS INDEX: 32.27 KG/M2 | HEIGHT: 66 IN

## 2024-05-23 DIAGNOSIS — N92.6 LATE MENSES: Primary | ICD-10-CM

## 2024-05-23 DIAGNOSIS — Z31.9 DESIRE FOR PREGNANCY: ICD-10-CM

## 2024-05-23 PROCEDURE — 99213 OFFICE O/P EST LOW 20 MIN: CPT | Performed by: PHYSICIAN ASSISTANT

## 2024-05-23 PROCEDURE — 81025 URINE PREGNANCY TEST: CPT | Performed by: PHYSICIAN ASSISTANT

## 2024-05-23 NOTE — PATIENT INSTRUCTIONS
Encourage to allow 2 more weeks to see if starts spontaneous cycle  Encourage  to have SE. Has been given order for SE previously

## 2024-05-23 NOTE — PROGRESS NOTES
Subjective   Chief Complaint   Patient presents with    Menstrual Problem     Patient states that she missed an period and has had a negative home pregnancy test.       Bertha Monte is a 28 y.o. year old  presenting to be seen for missed menses.  LMP 4/3/2024 so about 3 weeks late  Patient desiring conception. Has been having monthly cycles q 28-30 days previously and documenting positive ovulations  Trying to conceive for 2 years now. Had seen Dr. Garcia previously. Has laparoscopy proven endometriosis  Her  has still not had semen analysis yet  She is not having any pelvic pain or cramping  Had negative serum HCG last week with pcp. Negative UPT today      Past Medical History:   Diagnosis Date    Anemia     Anxiety 6 months ago    Endometriosis determined by laparoscopy 2023    Female infertility 2022    not officially been diagnosed with infertility just going based off of no pregnancy occurring after trying for a year plus    GERD (gastroesophageal reflux disease)     Headache     Hernia     Hiatal    Hydradenitis supp     Hypertension     Borderline    Low back pain     MRSA infection     , under breasts due to HS    Ovarian cyst 03/15/2024    PMS (premenstrual syndrome)     Seasonal allergies     Varicella     Wears glasses         Current Outpatient Medications:     acetaminophen (TYLENOL) 500 MG tablet, Take 2 tablets by mouth Every 8 (Eight) Hours As Needed for Mild Pain., Disp: 60 tablet, Rfl: 0    citalopram (CeleXA) 40 MG tablet, Take 1 tablet by mouth Daily., Disp: 30 tablet, Rfl: 1    Cosentyx UnoReady 300 MG/2ML solution auto-injector, , Disp: , Rfl:     famotidine (PEPCID) 40 MG tablet, TAKE 1 TABLET BY MOUTH EVERY DAY AT NIGHT, Disp: 90 tablet, Rfl: 1    gabapentin (NEURONTIN) 300 MG capsule, TAKE 1 CAPSULE BY MOUTH THREE TIMES A DAY, Disp: 90 capsule, Rfl: 0    ibuprofen (ADVIL,MOTRIN) 800 MG tablet, Take 1 tablet by mouth Every 8 (Eight) Hours  As Needed for Mild Pain for up to 30 doses., Disp: 30 tablet, Rfl: 0    lansoprazole (PREVACID) 30 MG capsule, Take 1 capsule by mouth Daily., Disp: , Rfl:    Allergies   Allergen Reactions    Egg-Derived Products Other (See Comments)     Sneezing      Past Surgical History:   Procedure Laterality Date    ADENOIDECTOMY  2006    DIAGNOSTIC LAPAROSCOPY N/A 11/30/2023    Procedure: DIAGNOSTIC LAPAROSCOPY, POSSIBLE ABLATION OF ENDOMETRIOSIS, AND LEFT OVARIAN CYSTECTOMY;  Surgeon: Toribio Garcia MD;  Location: [facility] OR;  Service: Obstetrics/Gynecology;  Laterality: N/A;    ENDOSCOPY  02/20/2019    Dr. Feliciano- 1 cm hiatal hernia, mild esophagitis, bile aspirate taken for crystal analysis and negative. No other path available.    ENDOSCOPY N/A 03/24/2023    Procedure: ESOPHAGOGASTRODUODENOSCOPY WITH BIOPSY ;  Surgeon: Garo Limon MD;  Location: [facility] ENDOSCOPY;  Service: Gastroenterology;  Laterality: N/A;    KNEE SURGERY Left     ACL & meniscus repair- has screws implanted    LUMBAR DISCECTOMY Right 05/25/2023    Procedure: LUMBAR FORAMINOTOMY RIGHT L5-S1;  Surgeon: Daniel Garibay MD;  Location: [facility] OR;  Service: Neurosurgery;  Laterality: Right;    OTOPLASTY Bilateral 2001    PELVIC LAPAROSCOPY  2023    SKIN BIOPSY      TONSILLECTOMY  2006    WISDOM TOOTH EXTRACTION  2020      Social History     Socioeconomic History    Marital status:    Tobacco Use    Smoking status: Never     Passive exposure: Never    Smokeless tobacco: Never   Vaping Use    Vaping status: Never Used   Substance and Sexual Activity    Alcohol use: Not Currently    Drug use: Never    Sexual activity: Yes     Partners: Male     Birth control/protection: None      Family History   Problem Relation Age of Onset    No Known Problems Mother     Hypertension Father     Hyperlipidemia Father     Cancer Maternal Grandmother     Cancer Paternal Grandmother     Breast cancer Paternal Grandmother     Prostate cancer Paternal  "Grandfather        Review of Systems   Constitutional:  Negative for chills, diaphoresis and fever.   Gastrointestinal: Negative.    Genitourinary:  Positive for menstrual problem. Negative for difficulty urinating, dysuria, pelvic pain and vaginal bleeding.           Objective   /74   Ht 167.6 cm (66\")   Wt 91.1 kg (200 lb 12.8 oz)   LMP 04/03/2024 (Exact Date)   BMI 32.41 kg/m²     Physical Exam  Constitutional:       Appearance: Normal appearance. She is well-developed and well-groomed.   Eyes:      General: Lids are normal.      Extraocular Movements: Extraocular movements intact.      Conjunctiva/sclera: Conjunctivae normal.   Neurological:      General: No focal deficit present.      Mental Status: She is alert and oriented to person, place, and time.   Psychiatric:         Attention and Perception: Attention normal.         Mood and Affect: Mood normal.         Speech: Speech normal.         Behavior: Behavior is cooperative.            Result Review :           HCG, B-subunit, Quantitative (05/14/2024 08:24)   POC Pregnancy, Urine (05/23/2024 10:56)       Assessment and Plan  Diagnoses and all orders for this visit:    1. Late menses (Primary)  -     POC Pregnancy, Urine    2. Desire for pregnancy      Patient Instructions   Encourage to allow 2 more weeks to see if starts spontaneous cycle  Encourage  to have SE. Has been given order for SE previously           This note was electronically signed.    Veronica Jalloh PA-C   May 23, 2024  "

## 2024-05-24 ENCOUNTER — OFFICE VISIT (OUTPATIENT)
Dept: INTERNAL MEDICINE | Facility: CLINIC | Age: 29
End: 2024-05-24
Payer: COMMERCIAL

## 2024-05-24 VITALS
HEART RATE: 75 BPM | BODY MASS INDEX: 32.32 KG/M2 | HEIGHT: 66 IN | WEIGHT: 201.12 LBS | DIASTOLIC BLOOD PRESSURE: 80 MMHG | TEMPERATURE: 97.3 F | SYSTOLIC BLOOD PRESSURE: 136 MMHG | OXYGEN SATURATION: 99 %

## 2024-05-24 DIAGNOSIS — M54.16 LUMBAR RADICULOPATHY: ICD-10-CM

## 2024-05-24 DIAGNOSIS — F41.1 GENERALIZED ANXIETY DISORDER: Primary | ICD-10-CM

## 2024-05-24 DIAGNOSIS — Z98.890 STATUS POST LUMBAR SPINE SURGERY FOR DECOMPRESSION OF SPINAL CORD: ICD-10-CM

## 2024-05-24 PROCEDURE — 99214 OFFICE O/P EST MOD 30 MIN: CPT | Performed by: NURSE PRACTITIONER

## 2024-05-24 RX ORDER — GABAPENTIN 300 MG/1
300 CAPSULE ORAL 3 TIMES DAILY
Qty: 90 CAPSULE | Refills: 0 | Status: SHIPPED | OUTPATIENT
Start: 2024-05-24

## 2024-05-24 RX ORDER — CITALOPRAM 40 MG/1
40 TABLET ORAL DAILY
Qty: 90 TABLET | Refills: 3 | Status: SHIPPED | OUTPATIENT
Start: 2024-05-24

## 2024-05-24 NOTE — PROGRESS NOTES
"  Office Visit      Patient Name: Bertha Monte  : 1995   MRN: [id number]   Care Team: Patient Care Team:  Halina Quezada APRN as PCP - General (Family Medicine)  Sofya Tay LPCC as Counselor (Behavioral Health)    Chief Complaint  Anxiety (Follow up on medication Citalopram. Patient states it is working well. )    Subjective     Subjective      Bertha Monte presents to [facility] for follow up on generalized anxiety disorder.   Started on citalopram last visit.  Patient reports feeling overall calmer. She says most of her remaining anxious feeling are tied to her occupation, but frequency and severity has improved tremendously. States the citalopram has improved her mood and she wishes to continue the medication regimen. Denies panic attacks, shortness of air, palpitations, suicidal/homicidal ideations, insomnia, and hypomania.  Patient requests refill on her gabapentin for lumbar radiculopathy, she had completed urine drug screen at previous visit.  She does like the gabapentin helps with her symptoms, she is status post lumbar decompression with Dr. Garibay, she no longer follows with their office and would like me to take over this medication.  She reports no new symptoms or new increased back pain.    Objective     Objective   Vital Signs:   /80   Pulse 75   Temp 97.3 °F (36.3 °C) (Tympanic)   Ht 167.6 cm (66\")   Wt 91.2 kg (201 lb 1.9 oz)   SpO2 99%   BMI 32.46 kg/m²     Physical Exam  Vitals and nursing note reviewed.   Constitutional:       Appearance: Normal appearance. She is obese.   Cardiovascular:      Rate and Rhythm: Normal rate and regular rhythm.      Heart sounds: Normal heart sounds.   Pulmonary:      Effort: Pulmonary effort is normal.      Breath sounds: Normal breath sounds.   Musculoskeletal:         General: Tenderness and deformity present.   Neurological:      General: No focal deficit present.      Mental Status: She is " alert and oriented to person, place, and time.   Psychiatric:         Mood and Affect: Mood normal.         Behavior: Behavior normal.         Thought Content: Thought content normal.         Judgment: Judgment normal.          Assessment / Plan      Assessment & Plan   Problem List Items Addressed This Visit    None  Visit Diagnoses       Generalized anxiety disorder    -  Primary    Relevant Medications    citalopram (CeleXA) 40 MG tablet      Improved with citalopram, continue at current dose.  Encouraged to continue healthy diet, exercise as tolerated, daily sun exposure, talk with supportive family and friends, and sleep hygiene measures.  Follow-up as scheduled.    Lumbar radiculopathy        Relevant Medications    gabapentin (NEURONTIN) 300 MG capsule    Status post lumbar spine surgery for decompression of spinal cord        Relevant Medications    gabapentin (NEURONTIN) 300 MG capsule    Continues to benefit from use of the medication.   Again, educated on addictive nature of controlled substances and risk vs. Benefits discussion regarding medication took place today. A joint decision was made to continue the medication at lowest effective dose today.   JASSI obtained and appropriate. Previous UDS reviewed and appropriate, updated at today's visit.   Follow-up as scheduled.            This note accurately reflects work and decisions made by me.    Follow Up   Return for Keep scheduled follow-up.  Patient was given instructions and counseling regarding her condition or for health maintenance advice. Please see specific information pulled into the AVS if appropriate.     VANCE Kaye  [facility] - [place]

## 2024-06-20 ENCOUNTER — TELEPHONE (OUTPATIENT)
Dept: OBSTETRICS AND GYNECOLOGY | Facility: CLINIC | Age: 29
End: 2024-06-20
Payer: COMMERCIAL

## 2024-06-20 NOTE — TELEPHONE ENCOUNTER
Caller: Bertha Monte    Relationship: Self    Best call back number: [phone]      Who are you requesting to speak with (clinical staff, CAMELIA ISBELL PA-C      What was the call regarding: PATIENT ADVISED THAT SINCE YESTERDAY SHE HAS BEEN SPOTTING AND PASSING CLOTS AND MILD CRAMPING.  WOULD LIKE TO KNOW WHAT TO DO

## 2024-06-20 NOTE — TELEPHONE ENCOUNTER
Spoke with patient and she has just had a positive pregnancy test. I explained to her that this can happen in early pregnancy . I advised her to stay off her feet today and drink lots of water and no intercourse until her visit in July. If bleeding gets heavy or she has increase in pain she is to go to the ER.

## 2024-06-21 ENCOUNTER — TELEPHONE (OUTPATIENT)
Dept: OBSTETRICS AND GYNECOLOGY | Facility: CLINIC | Age: 29
End: 2024-06-21
Payer: COMMERCIAL

## 2024-06-21 ENCOUNTER — APPOINTMENT (OUTPATIENT)
Dept: ULTRASOUND IMAGING | Facility: HOSPITAL | Age: 29
End: 2024-06-21
Payer: COMMERCIAL

## 2024-06-21 ENCOUNTER — HOSPITAL ENCOUNTER (EMERGENCY)
Facility: HOSPITAL | Age: 29
Discharge: HOME | End: 2024-06-21
Attending: STUDENT IN AN ORGANIZED HEALTH CARE EDUCATION/TRAINING PROGRAM
Payer: COMMERCIAL

## 2024-06-21 VITALS
DIASTOLIC BLOOD PRESSURE: 87 MMHG | WEIGHT: 201 LBS | RESPIRATION RATE: 18 BRPM | BODY MASS INDEX: 32.3 KG/M2 | SYSTOLIC BLOOD PRESSURE: 146 MMHG | HEIGHT: 66 IN | OXYGEN SATURATION: 99 % | HEART RATE: 100 BPM | TEMPERATURE: 97.9 F

## 2024-06-21 DIAGNOSIS — O03.9 SPONTANEOUS MISCARRIAGE: Primary | ICD-10-CM

## 2024-06-21 LAB
ABO GROUP BLD: NORMAL
ABO GROUP BLD: NORMAL
BACTERIA UR QL AUTO: ABNORMAL /HPF
BASOPHILS # BLD AUTO: 0.01 10*3/MM3 (ref 0–0.2)
BASOPHILS NFR BLD AUTO: 0.1 % (ref 0–1.5)
BILIRUB UR QL STRIP: NEGATIVE
BLD GP AB SCN SERPL QL: NEGATIVE
CLARITY UR: CLEAR
COLOR UR: ABNORMAL
DEPRECATED RDW RBC AUTO: 43.4 FL (ref 37–54)
EOSINOPHIL # BLD AUTO: 0.23 10*3/MM3 (ref 0–0.4)
EOSINOPHIL NFR BLD AUTO: 3.4 % (ref 0.3–6.2)
ERYTHROCYTE [DISTWIDTH] IN BLOOD BY AUTOMATED COUNT: 13.2 % (ref 12.3–15.4)
GLUCOSE UR STRIP-MCNC: NEGATIVE MG/DL
HCG INTACT+B SERPL-ACNC: 1.19 MIU/ML
HCT VFR BLD AUTO: 38.7 % (ref 34–46.6)
HGB BLD-MCNC: 13 G/DL (ref 12–15.9)
HGB UR QL STRIP.AUTO: ABNORMAL
HOLD SPECIMEN: NORMAL
HOLD SPECIMEN: NORMAL
HYALINE CASTS UR QL AUTO: ABNORMAL /LPF
IMM GRANULOCYTES # BLD AUTO: 0.02 10*3/MM3 (ref 0–0.05)
IMM GRANULOCYTES NFR BLD AUTO: 0.3 % (ref 0–0.5)
KETONES UR QL STRIP: NEGATIVE
LEUKOCYTE ESTERASE UR QL STRIP.AUTO: NEGATIVE
LYMPHOCYTES # BLD AUTO: 2.92 10*3/MM3 (ref 0.7–3.1)
LYMPHOCYTES NFR BLD AUTO: 43.3 % (ref 19.6–45.3)
MCH RBC QN AUTO: 30 PG (ref 26.6–33)
MCHC RBC AUTO-ENTMCNC: 33.6 G/DL (ref 31.5–35.7)
MCV RBC AUTO: 89.2 FL (ref 79–97)
MONOCYTES # BLD AUTO: 0.56 10*3/MM3 (ref 0.1–0.9)
MONOCYTES NFR BLD AUTO: 8.3 % (ref 5–12)
NEUTROPHILS NFR BLD AUTO: 3 10*3/MM3 (ref 1.7–7)
NEUTROPHILS NFR BLD AUTO: 44.6 % (ref 42.7–76)
NITRITE UR QL STRIP: NEGATIVE
NRBC BLD AUTO-RTO: 0 /100 WBC (ref 0–0.2)
PH UR STRIP.AUTO: 7 [PH] (ref 5–8)
PLATELET # BLD AUTO: 263 10*3/MM3 (ref 140–450)
PMV BLD AUTO: 10.4 FL (ref 6–12)
PROT UR QL STRIP: ABNORMAL
RBC # BLD AUTO: 4.34 10*6/MM3 (ref 3.77–5.28)
RBC # UR STRIP: ABNORMAL /HPF
REF LAB TEST METHOD: ABNORMAL
RH BLD: NEGATIVE
RH BLD: NEGATIVE
SP GR UR STRIP: <=1.005 (ref 1–1.03)
SQUAMOUS #/AREA URNS HPF: ABNORMAL /HPF
T&S EXPIRATION DATE: NORMAL
UROBILINOGEN UR QL STRIP: ABNORMAL
WBC # UR STRIP: ABNORMAL /HPF
WBC NRBC COR # BLD AUTO: 6.74 10*3/MM3 (ref 3.4–10.8)
WHOLE BLOOD HOLD COAG: NORMAL
WHOLE BLOOD HOLD SPECIMEN: NORMAL

## 2024-06-21 PROCEDURE — 36415 COLL VENOUS BLD VENIPUNCTURE: CPT

## 2024-06-21 PROCEDURE — 85025 COMPLETE CBC W/AUTO DIFF WBC: CPT | Performed by: STUDENT IN AN ORGANIZED HEALTH CARE EDUCATION/TRAINING PROGRAM

## 2024-06-21 PROCEDURE — 86901 BLOOD TYPING SEROLOGIC RH(D): CPT | Performed by: STUDENT IN AN ORGANIZED HEALTH CARE EDUCATION/TRAINING PROGRAM

## 2024-06-21 PROCEDURE — 25810000003 SODIUM CHLORIDE 0.9 % SOLUTION: Performed by: STUDENT IN AN ORGANIZED HEALTH CARE EDUCATION/TRAINING PROGRAM

## 2024-06-21 PROCEDURE — 99284 EMERGENCY DEPT VISIT MOD MDM: CPT

## 2024-06-21 PROCEDURE — 96360 HYDRATION IV INFUSION INIT: CPT

## 2024-06-21 PROCEDURE — 81001 URINALYSIS AUTO W/SCOPE: CPT

## 2024-06-21 PROCEDURE — 86900 BLOOD TYPING SEROLOGIC ABO: CPT

## 2024-06-21 PROCEDURE — 76817 TRANSVAGINAL US OBSTETRIC: CPT

## 2024-06-21 PROCEDURE — 86900 BLOOD TYPING SEROLOGIC ABO: CPT | Performed by: STUDENT IN AN ORGANIZED HEALTH CARE EDUCATION/TRAINING PROGRAM

## 2024-06-21 PROCEDURE — 86901 BLOOD TYPING SEROLOGIC RH(D): CPT

## 2024-06-21 PROCEDURE — 84702 CHORIONIC GONADOTROPIN TEST: CPT | Performed by: STUDENT IN AN ORGANIZED HEALTH CARE EDUCATION/TRAINING PROGRAM

## 2024-06-21 PROCEDURE — 86850 RBC ANTIBODY SCREEN: CPT | Performed by: STUDENT IN AN ORGANIZED HEALTH CARE EDUCATION/TRAINING PROGRAM

## 2024-06-21 RX ORDER — IBUPROFEN 800 MG/1
800 TABLET ORAL EVERY 8 HOURS PRN
Qty: 15 TABLET | Refills: 0 | Status: SHIPPED | OUTPATIENT
Start: 2024-06-21

## 2024-06-21 RX ORDER — SODIUM CHLORIDE 0.9 % (FLUSH) 0.9 %
10 SYRINGE (ML) INJECTION AS NEEDED
Status: DISCONTINUED | OUTPATIENT
Start: 2024-06-21 | End: 2024-06-21 | Stop reason: HOSPADM

## 2024-06-21 RX ADMIN — SODIUM CHLORIDE 1000 ML: 9 INJECTION, SOLUTION INTRAVENOUS at 06:28

## 2024-06-21 NOTE — ED PROVIDER NOTES
EMERGENCY DEPARTMENT ENCOUNTER    Pt Name: Bertha Monte  MRN: [id number]  Pt :   1995  Room Number:    Date of encounter:  2024  PCP: Halina Quezada APRN  ED Physician: Chuck Cho DO      HPI:  Chief Complaint: Vaginal bleeding    Context: Bertha Monte is a 28 y.o. female  currently 7 weeks pregnant based on positive home pregnancy tests who presents to the ED with vaginal bleeding. Started two days ago as spotting, but has progressively worsened over the past 24 hours. Now experiencing heavier bleeding and abdominal cramping. Duration constant. Tried OTC tylenol with some relief. No other associated symptoms including fever, chills, lightheadedness or dizziness, chest pain or shortness of breath, problems with urination or bowel movements.    PAST MEDICAL HISTORY  Past Medical History:   Diagnosis Date    Anemia     Anxiety 6 months ago    Endometriosis determined by laparoscopy 2023    Female infertility 2022    not officially been diagnosed with infertility just going based off of no pregnancy occurring after trying for a year plus    GERD (gastroesophageal reflux disease)     Headache     Hernia     Hiatal    Hydradenitis supp     Hypertension     Borderline    Low back pain     MRSA infection     2015, under breasts due to HS    Ovarian cyst 03/15/2024    PMS (premenstrual syndrome)     Seasonal allergies     Varicella     Wears glasses      Current Outpatient Medications   Medication Instructions    acetaminophen (TYLENOL) 1,000 mg, Oral, Every 8 Hours PRN    citalopram (CELEXA) 40 mg, Oral, Daily    Cosentyx UnoReady 300 MG/2ML solution auto-injector     famotidine (PEPCID) 40 mg, Oral, Every Night at Bedtime    gabapentin (NEURONTIN) 300 mg, Oral, 3 Times Daily    ibuprofen (ADVIL,MOTRIN) 800 mg, Oral, Every 8 Hours PRN    lansoprazole (PREVACID) 30 mg, Oral, Daily        PAST SURGICAL HISTORY  Past Surgical History:   Procedure  Laterality Date    ADENOIDECTOMY  2006    DIAGNOSTIC LAPAROSCOPY N/A 11/30/2023    Procedure: DIAGNOSTIC LAPAROSCOPY, POSSIBLE ABLATION OF ENDOMETRIOSIS, AND LEFT OVARIAN CYSTECTOMY;  Surgeon: Toribio Garcia MD;  Location: [facility] OR;  Service: Obstetrics/Gynecology;  Laterality: N/A;    ENDOSCOPY  02/20/2019    Dr. Feliciano- 1 cm hiatal hernia, mild esophagitis, bile aspirate taken for crystal analysis and negative. No other path available.    ENDOSCOPY N/A 03/24/2023    Procedure: ESOPHAGOGASTRODUODENOSCOPY WITH BIOPSY ;  Surgeon: Garo Limon MD;  Location: [facility] ENDOSCOPY;  Service: Gastroenterology;  Laterality: N/A;    KNEE SURGERY Left     ACL & meniscus repair- has screws implanted    LUMBAR DISCECTOMY Right 05/25/2023    Procedure: LUMBAR FORAMINOTOMY RIGHT L5-S1;  Surgeon: Daniel Garibay MD;  Location: [facility] OR;  Service: Neurosurgery;  Laterality: Right;    OTOPLASTY Bilateral 2001    PELVIC LAPAROSCOPY  2023    SKIN BIOPSY      TONSILLECTOMY  2006    WISDOM TOOTH EXTRACTION  2020       FAMILY HISTORY  Family History   Problem Relation Age of Onset    No Known Problems Mother     Hypertension Father     Hyperlipidemia Father     Cancer Maternal Grandmother     Cancer Paternal Grandmother     Breast cancer Paternal Grandmother     Prostate cancer Paternal Grandfather        SOCIAL HISTORY  Social History     Socioeconomic History    Marital status:    Tobacco Use    Smoking status: Never     Passive exposure: Never    Smokeless tobacco: Never   Vaping Use    Vaping status: Never Used   Substance and Sexual Activity    Alcohol use: Not Currently    Drug use: Never    Sexual activity: Yes     Partners: Male     Birth control/protection: None       ALLERGIES  Egg-derived products    REVIEW OF SYSTEMS  All systems reviewed and negative except for those discussed in HPI.     PHYSICAL EXAM  ED Triage Vitals [06/21/24 0523]   Temp Heart Rate Resp BP SpO2   97.9 °F (36.6 °C) 110 20 (!)  164/102 98 %      Temp src Heart Rate Source Patient Position BP Location FiO2 (%)   Oral Monitor Sitting Left arm --     I have reviewed the triage vital signs and nursing notes.    General: Alert.  Nontoxic appearance.  No acute distress.  Head: Normocephalic.  Atraumatic.  Eyes: No scleral icterus.  ENT: Moist mucous membranes.  Cardiovascular: Regular rate and rhythm.  No murmurs.  No rubs.  2+ distal pulses bilaterally.  Respiratory: Equal breath sounds bilaterally.  No rales.  No rhonchi.  No wheezing.  GI: Abdomen is soft.  Nondistended.  Nontender to palpation.  No rebound.  No guarding.  No CVA tenderness.  MSK: Moves all 4 extremities.  Neurologic: Oriented x 3.  No focal deficits.  Skin: No erythema.  No edema. No pallor. No cyanosis.  Psych: Normal mood. Pleasant affect.    LAB RESULTS  Recent Results (from the past 24 hour(s))   Green Top (Gel)    Collection Time: 06/21/24  5:35 AM   Result Value Ref Range    Extra Tube Hold for add-ons.    Lavender Top    Collection Time: 06/21/24  5:35 AM   Result Value Ref Range    Extra Tube hold for add-on    Gold Top - SST    Collection Time: 06/21/24  5:35 AM   Result Value Ref Range    Extra Tube Hold for add-ons.    Light Blue Top    Collection Time: 06/21/24  5:35 AM   Result Value Ref Range    Extra Tube Hold for add-ons.    hCG, Quantitative, Pregnancy    Collection Time: 06/21/24  5:35 AM    Specimen: Blood   Result Value Ref Range    HCG Quantitative 1.19 mIU/mL   ABO RH Specimen Verification    Collection Time: 06/21/24  5:35 AM    Specimen: Blood   Result Value Ref Range    ABO Type A     RH type Negative    CBC Auto Differential    Collection Time: 06/21/24  5:35 AM    Specimen: Blood   Result Value Ref Range    WBC 6.74 3.40 - 10.80 10*3/mm3    RBC 4.34 3.77 - 5.28 10*6/mm3    Hemoglobin 13.0 12.0 - 15.9 g/dL    Hematocrit 38.7 34.0 - 46.6 %    MCV 89.2 79.0 - 97.0 fL    MCH 30.0 26.6 - 33.0 pg    MCHC 33.6 31.5 - 35.7 g/dL    RDW 13.2 12.3 - 15.4 %     RDW-SD 43.4 37.0 - 54.0 fl    MPV 10.4 6.0 - 12.0 fL    Platelets 263 140 - 450 10*3/mm3    Neutrophil % 44.6 42.7 - 76.0 %    Lymphocyte % 43.3 19.6 - 45.3 %    Monocyte % 8.3 5.0 - 12.0 %    Eosinophil % 3.4 0.3 - 6.2 %    Basophil % 0.1 0.0 - 1.5 %    Immature Grans % 0.3 0.0 - 0.5 %    Neutrophils, Absolute 3.00 1.70 - 7.00 10*3/mm3    Lymphocytes, Absolute 2.92 0.70 - 3.10 10*3/mm3    Monocytes, Absolute 0.56 0.10 - 0.90 10*3/mm3    Eosinophils, Absolute 0.23 0.00 - 0.40 10*3/mm3    Basophils, Absolute 0.01 0.00 - 0.20 10*3/mm3    Immature Grans, Absolute 0.02 0.00 - 0.05 10*3/mm3    nRBC 0.0 0.0 - 0.2 /100 WBC   Urinalysis With Microscopic If Indicated (No Culture) - Urine, Clean Catch    Collection Time: 06/21/24  5:42 AM    Specimen: Urine, Clean Catch   Result Value Ref Range    Color, UA Red (A) Yellow, Straw    Appearance, UA Clear Clear    pH, UA 7.0 5.0 - 8.0    Specific Gravity, UA <=1.005 1.005 - 1.030    Glucose, UA Negative Negative    Ketones, UA Negative Negative    Bilirubin, UA Negative Negative    Blood, UA Large (3+) (A) Negative    Protein, UA Trace (A) Negative    Leuk Esterase, UA Negative Negative    Nitrite, UA Negative Negative    Urobilinogen, UA 0.2 E.U./dL 0.2 - 1.0 E.U./dL   Urinalysis, Microscopic Only - Urine, Clean Catch    Collection Time: 06/21/24  5:42 AM    Specimen: Urine, Clean Catch   Result Value Ref Range    RBC, UA Too Numerous to Count (A) None Seen, 0-2 /HPF    WBC, UA None Seen None Seen, 0-2 /HPF    Bacteria, UA None Seen None Seen /HPF    Squamous Epithelial Cells, UA 0-2 None Seen, 0-2 /HPF    Hyaline Casts, UA None Seen None Seen /LPF    Methodology Manual Light Microscopy    Type & Screen    Collection Time: 06/21/24  6:07 AM    Specimen: Blood   Result Value Ref Range    ABO Type A     RH type Negative     Antibody Screen Negative     T&S Expiration Date 6/24/2024 11:59:59 PM        RADIOLOGY  US Ob Transvaginal    Result Date: 6/21/2024  PROCEDURE: US OB  TRANSVAGINAL-  HISTORY: Vaginal bleeding  Transvaginal evaluation the pelvis shows no definite intrauterine pregnancy. No significant free fluid is present. No adnexal mass is seen. Ovaries show normal size and morphology.      No demonstrable intrauterine pregnancy. Differential considerations include spontaneous , pregnancy too early for sonographic detection and much less likely nonvisualized ectopic pregnancy. Followup serum HCG and pelvic ultrasound as clinically indicated is recommended.   This report was signed and finalized on 2024 7:41 AM by Chin Song MD.       PROCEDURES  Procedures    MEDICATIONS GIVEN IN ER  Medications   sodium chloride 0.9 % flush 10 mL (has no administration in time range)   sodium chloride 0.9 % bolus 1,000 mL (1,000 mL Intravenous New Bag 24 0628)       MEDICAL DECISION MAKING  28 y.o. female with past medical history listed above who presents with vaginal bleeding in early pregnancy.    Vital signs remarkable for hypertension and tachycardia, otherwise within normal limits.    Based on clinical presentation and physical exam, differential diagnosis includes, but is not limited to, threatened versus spontaneous miscarriage, uterine structural pathology, anemia.    At least 3 different tests have been ordered on this patient.    Please see ED course below for my interpretation of the ED workup.  ED Course as of 24 0757      075 I reviewed the labs listed above. Clinically unremarkable.    Notable abnormalities are highlighted below.    Old laboratory data was reviewed from the medical records and compared to today's results.   [JS]   0756 Hemoglobin: 13.0 [JS]   0756 HCG Quantitative: 1.19 [JS]   0756 Urinalysis With Microscopic If Indicated (No Culture) - Urine, Clean Catch(!) [JS]   0756 Leukocytes, UA: Negative [JS]   0756 Nitrite, UA: Negative [JS]   0756 WBC, UA: None Seen [JS]   0756 Blood, UA(!): Large (3+) [JS]   0756 US Ob  Transvaginal  Per radiology report no IUP. [JS]      ED Course User Index  [JS] Chuck Cho DO      Findings of the ED workup consistent with spontaneous miscarriage.    On re-evaluation, patient resting comfortably.  States symptoms have improved following therapy. Vital signs remained stable on room air.  Patient was ambulatory in the ED with steady gait.  Able to tolerate oral intake appropriately.    I discussed the findings of the ED workup with the patient at bedside.  No clinical indication for admission.  I recommended outpatient follow-up with PCP/OBGYN.  Patient was deemed medically stable for discharge with close outpatient follow-up and strict ED return precautions. Patient agreeable with plan and disposition.    Home medications were reviewed.  Prescriptions for discharge: Ibuprofen    Chronic conditions affecting care: None    Social determinants of health impacting treatment or disposition: None    REPEAT VITAL SIGNS  AS OF 07:57 EDT VITALS:  BP - 146/87  HR - 100  TEMP - 97.9 °F (36.6 °C) (Oral)  O2 SATS - 99%    DIAGNOSIS  Final diagnoses:   Spontaneous miscarriage       DISPOSITION  ED Disposition       ED Disposition   Discharge    Condition   Stable    Comment   --                     Please note that portions of this document were completed with voice recognition software.        Chuck Cho DO  06/24/24 4882

## 2024-06-21 NOTE — TELEPHONE ENCOUNTER
Patient called stating she was seen in ED for miscarriage, she is RH negative and is wanting to know if she needs to come in for Rhogam? She had her NOB appointment scheduled for 07/17/24, she is requesting follow up appointment sooner for the miscarriage.    Patient request that we call her mother-in-law: Ghazala Monte [phone]

## 2024-06-21 NOTE — Clinical Note
[facility] EMERGENCY DEPARTMENT  [address] [address]  Phone: [phone]    Bertha Monte was seen and treated in our emergency department on 6/21/2024.  She may return to work on 06/24/2024.         Thank you for choosing [facility].    Chuck Cho,

## 2024-06-21 NOTE — TELEPHONE ENCOUNTER
Spoke with Dr Garcia and she does not need a Rhogam shot at this early stage of pregnancy. Made a follow up appointment for 07/03/2024

## 2024-06-30 DIAGNOSIS — K21.9 GASTROESOPHAGEAL REFLUX DISEASE, UNSPECIFIED WHETHER ESOPHAGITIS PRESENT: ICD-10-CM

## 2024-07-01 RX ORDER — FAMOTIDINE 40 MG/1
40 TABLET, FILM COATED ORAL
Qty: 90 TABLET | Refills: 0 | Status: SHIPPED | OUTPATIENT
Start: 2024-07-01

## 2024-07-03 ENCOUNTER — OFFICE VISIT (OUTPATIENT)
Dept: OBSTETRICS AND GYNECOLOGY | Facility: CLINIC | Age: 29
End: 2024-07-03
Payer: COMMERCIAL

## 2024-07-03 VITALS
DIASTOLIC BLOOD PRESSURE: 82 MMHG | BODY MASS INDEX: 32.62 KG/M2 | SYSTOLIC BLOOD PRESSURE: 134 MMHG | HEIGHT: 66 IN | WEIGHT: 203 LBS

## 2024-07-03 DIAGNOSIS — Z31.9 DESIRE FOR PREGNANCY: Primary | ICD-10-CM

## 2024-07-03 DIAGNOSIS — N80.9 ENDOMETRIOSIS: ICD-10-CM

## 2024-07-03 DIAGNOSIS — O03.9 SAB (SPONTANEOUS ABORTION): ICD-10-CM

## 2024-07-10 NOTE — PROGRESS NOTES
"GYN Office Visit  Subjective   Chief Complaint   Patient presents with    Miscarriage     ER follow up for miscarriage       Bertha Monte is a 28 y.o. year old  presenting to be seen for follow-up miscarriage.    She was seen in  for a very early miscarriage.  hCG testing was negative at that visit.  Imaging was reassuring.  She would like to be pregnant in the near future.       Objective   /82   Ht 167.6 cm (66\")   Wt 92.1 kg (203 lb)   BMI 32.77 kg/m²     Physical Exam:  None     Assessment   Possible recent early SAB  Endometriosis determined by laparoscopy  Desire for pregnancy    We reviewed her situation in detail today.  It is possible that she experienced a very early SAB in .  She would like to be pregnant.  We discussed the menstrual cycle and timed intercourse.  Daily prenatal vitamin.  All questions answered.    Coding/billing based on time: 20 total minutes were required for this encounter.    Toribio Garcia MD  Obstetrics and Gynecology  [facility]   "

## 2024-07-26 ENCOUNTER — OFFICE VISIT (OUTPATIENT)
Dept: INTERNAL MEDICINE | Facility: CLINIC | Age: 29
End: 2024-07-26

## 2024-07-26 VITALS
SYSTOLIC BLOOD PRESSURE: 124 MMHG | DIASTOLIC BLOOD PRESSURE: 80 MMHG | BODY MASS INDEX: 32.47 KG/M2 | HEART RATE: 63 BPM | WEIGHT: 202 LBS | OXYGEN SATURATION: 98 % | TEMPERATURE: 97.6 F | RESPIRATION RATE: 18 BRPM | HEIGHT: 66 IN

## 2024-07-26 DIAGNOSIS — M54.2 NECK PAIN ON LEFT SIDE: ICD-10-CM

## 2024-07-26 DIAGNOSIS — M54.9 UPPER BACK PAIN ON LEFT SIDE: ICD-10-CM
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
## 2024-07-26 DIAGNOSIS — R51.9 FREQUENT HEADACHES: ICD-10-CM

## 2024-07-26 PROCEDURE — 99213 OFFICE O/P EST LOW 20 MIN: CPT | Performed by: PHYSICIAN ASSISTANT
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
## 2024-07-28 DIAGNOSIS — M54.16 LUMBAR RADICULOPATHY: ICD-10-CM

## 2024-07-28 DIAGNOSIS — Z98.890 STATUS POST LUMBAR SPINE SURGERY FOR DECOMPRESSION OF SPINAL CORD: ICD-10-CM

## 2024-07-29 RX ORDER — GABAPENTIN 300 MG/1
300 CAPSULE ORAL 3 TIMES DAILY
Qty: 90 CAPSULE | Refills: 0 | Status: SHIPPED | OUTPATIENT
Start: 2024-07-29

## 2024-07-29 NOTE — TELEPHONE ENCOUNTER
Rx Refill Note  Requested Prescriptions     Pending Prescriptions Disp Refills    gabapentin (NEURONTIN) 300 MG capsule [Pharmacy Med Name: GABAPENTIN 300 MG CAPSULE] 90 capsule      Sig: TAKE 1 CAPSULE BY MOUTH 3 TIMES A DAY      Last office visit with prescribing clinician: 5/24/2024   Last telemedicine visit with prescribing clinician: Visit date not found   Next office visit with prescribing clinician: 9/27/2024                         Would you like a call back once the refill request has been completed: [] Yes [] No    If the office needs to give you a call back, can they leave a voicemail: [] Yes [] No    Fozia Trinh MA  07/29/24, 10:06 EDT

## 2024-09-25 DIAGNOSIS — K21.9 GASTROESOPHAGEAL REFLUX DISEASE, UNSPECIFIED WHETHER ESOPHAGITIS PRESENT: ICD-10-CM

## 2024-09-25 RX ORDER — FAMOTIDINE 40 MG/1
40 TABLET, FILM COATED ORAL
Qty: 90 TABLET | Refills: 0 | OUTPATIENT
Start: 2024-09-25

## 2024-09-27 ENCOUNTER — OFFICE VISIT (OUTPATIENT)
Dept: INTERNAL MEDICINE | Facility: CLINIC | Age: 29
End: 2024-09-27
Payer: COMMERCIAL

## 2024-09-27 VITALS
OXYGEN SATURATION: 99 % | RESPIRATION RATE: 16 BRPM | TEMPERATURE: 97.5 F | SYSTOLIC BLOOD PRESSURE: 123 MMHG | HEIGHT: 66 IN | DIASTOLIC BLOOD PRESSURE: 87 MMHG | HEART RATE: 77 BPM | BODY MASS INDEX: 32.47 KG/M2 | WEIGHT: 202 LBS

## 2024-09-27 DIAGNOSIS — Z00.00 ANNUAL PHYSICAL EXAM: Primary | ICD-10-CM

## 2024-09-27 DIAGNOSIS — Z98.890 STATUS POST LUMBAR SPINE SURGERY FOR DECOMPRESSION OF SPINAL CORD: ICD-10-CM

## 2024-09-27 DIAGNOSIS — E66.09 CLASS 1 OBESITY DUE TO EXCESS CALORIES WITHOUT SERIOUS COMORBIDITY WITH BODY MASS INDEX (BMI) OF 32.0 TO 32.9 IN ADULT: ICD-10-CM

## 2024-09-27 DIAGNOSIS — K21.9 GASTROESOPHAGEAL REFLUX DISEASE WITHOUT ESOPHAGITIS: ICD-10-CM

## 2024-09-27 DIAGNOSIS — F41.1 GENERALIZED ANXIETY DISORDER: ICD-10-CM

## 2024-09-27 PROCEDURE — 99395 PREV VISIT EST AGE 18-39: CPT | Performed by: NURSE PRACTITIONER

## 2024-09-27 RX ORDER — LANSOPRAZOLE 30 MG/1
30 CAPSULE, DELAYED RELEASE ORAL DAILY
Qty: 90 CAPSULE | Refills: 1 | Status: SHIPPED | OUTPATIENT
Start: 2024-09-27

## 2024-10-19 DIAGNOSIS — K21.9 GASTROESOPHAGEAL REFLUX DISEASE, UNSPECIFIED WHETHER ESOPHAGITIS PRESENT: ICD-10-CM

## 2024-10-21 RX ORDER — FAMOTIDINE 40 MG/1
40 TABLET, FILM COATED ORAL
Qty: 90 TABLET | Refills: 0 | OUTPATIENT
Start: 2024-10-21

## 2024-10-21 RX ORDER — FAMOTIDINE 40 MG/1
40 TABLET, FILM COATED ORAL
Qty: 90 TABLET | Refills: 3 | Status: SHIPPED | OUTPATIENT
Start: 2024-10-21

## 2024-12-03 DIAGNOSIS — Z98.890 STATUS POST LUMBAR SPINE SURGERY FOR DECOMPRESSION OF SPINAL CORD: ICD-10-CM

## 2024-12-03 DIAGNOSIS — M54.16 LUMBAR RADICULOPATHY: ICD-10-CM

## 2024-12-03 RX ORDER — GABAPENTIN 300 MG/1
300 CAPSULE ORAL 3 TIMES DAILY
Qty: 90 CAPSULE | Refills: 0 | Status: SHIPPED | OUTPATIENT
Start: 2024-12-03

## 2024-12-03 NOTE — TELEPHONE ENCOUNTER
Rx Refill Note  Requested Prescriptions     Pending Prescriptions Disp Refills    gabapentin (NEURONTIN) 300 MG capsule 90 capsule 0     Sig: Take 1 capsule by mouth 3 (Three) Times a Day.      Last office visit with prescribing clinician: 9/27/2024 Annual Physical done.    Next office visit with prescribing clinician: 3/28/2025     Last UDS 04/12/2024. Last CSA 04/12/2024.      Jovan Erazo  12/03/24, 10:41 EST

## 2025-02-13 DIAGNOSIS — Z98.890 STATUS POST LUMBAR SPINE SURGERY FOR DECOMPRESSION OF SPINAL CORD: ICD-10-CM

## 2025-02-13 DIAGNOSIS — M54.16 LUMBAR RADICULOPATHY: ICD-10-CM

## 2025-02-14 ENCOUNTER — PREP FOR SURGERY (OUTPATIENT)
Dept: OTHER | Facility: HOSPITAL | Age: 30
End: 2025-02-14
Payer: COMMERCIAL

## 2025-02-14 ENCOUNTER — OFFICE VISIT (OUTPATIENT)
Dept: OBSTETRICS AND GYNECOLOGY | Facility: CLINIC | Age: 30
End: 2025-02-14
Payer: COMMERCIAL

## 2025-02-14 VITALS
SYSTOLIC BLOOD PRESSURE: 144 MMHG | DIASTOLIC BLOOD PRESSURE: 90 MMHG | HEIGHT: 66 IN | BODY MASS INDEX: 32.47 KG/M2 | WEIGHT: 202 LBS

## 2025-02-14 DIAGNOSIS — N80.9 ENDOMETRIOSIS DETERMINED BY LAPAROSCOPY: Primary | ICD-10-CM

## 2025-02-14 PROCEDURE — 99214 OFFICE O/P EST MOD 30 MIN: CPT | Performed by: OBSTETRICS & GYNECOLOGY

## 2025-02-14 RX ORDER — SODIUM CHLORIDE 0.9 % (FLUSH) 0.9 %
3 SYRINGE (ML) INJECTION EVERY 12 HOURS SCHEDULED
OUTPATIENT
Start: 2025-02-14

## 2025-02-14 RX ORDER — SODIUM CHLORIDE 9 MG/ML
40 INJECTION, SOLUTION INTRAVENOUS AS NEEDED
OUTPATIENT
Start: 2025-02-14

## 2025-02-14 RX ORDER — GABAPENTIN 300 MG/1
300 CAPSULE ORAL 3 TIMES DAILY
Qty: 90 CAPSULE | Refills: 0 | Status: SHIPPED | OUTPATIENT
Start: 2025-02-14

## 2025-02-14 RX ORDER — SODIUM CHLORIDE 0.9 % (FLUSH) 0.9 %
10 SYRINGE (ML) INJECTION AS NEEDED
OUTPATIENT
Start: 2025-02-14

## 2025-02-20 NOTE — PROGRESS NOTES
GYN Office Visit    Subjective   Chief Complaint   Patient presents with    Consult     Discuss endometriosis      Bertha Monte is a 29 y.o. year old  presenting to be seen for follow-up endometriosis.    She continues to struggle with endometriosis related pain symptoms.  She underwent laparoscopy with ablation of Endo, chromopertubation and left ovarian cystectomy in 2023.  She and her partner pursued pregnancy after that procedure so she has never tried any particular endometriosis medication.     OB Hx: Nulliparous  Contraception: None  Pap smear:     Past Medical History:   Diagnosis Date    Anemia     Anxiety 6 months ago    Endometriosis determined by laparoscopy 2023    Female infertility 2022    not officially been diagnosed with infertility just going based off of no pregnancy occurring after trying for a year plus    GERD (gastroesophageal reflux disease)     Headache     Hernia     Hiatal    Hydradenitis supp     Hypertension     Borderline    Low back pain     MRSA infection     , under breasts due to HS    Ovarian cyst 03/15/2024    PMS (premenstrual syndrome)     Seasonal allergies     Varicella     Wears glasses        Past Surgical History:   Procedure Laterality Date    ADENOIDECTOMY  2006    DIAGNOSTIC LAPAROSCOPY N/A 2023    Procedure: DIAGNOSTIC LAPAROSCOPY, POSSIBLE ABLATION OF ENDOMETRIOSIS, AND LEFT OVARIAN CYSTECTOMY;  Surgeon: Toribio Garcia MD;  Location: [facility] OR;  Service: Obstetrics/Gynecology;  Laterality: N/A;    ENDOSCOPY  2019    Dr. Feliciano- 1 cm hiatal hernia, mild esophagitis, bile aspirate taken for crystal analysis and negative. No other path available.    ENDOSCOPY N/A 2023    Procedure: ESOPHAGOGASTRODUODENOSCOPY WITH BIOPSY ;  Surgeon: Garo Limon MD;  Location: [facility] ENDOSCOPY;  Service: Gastroenterology;  Laterality: N/A;    KNEE SURGERY Left     ACL & meniscus repair- has screws  "implanted    LUMBAR DISCECTOMY Right 05/25/2023    Procedure: LUMBAR FORAMINOTOMY RIGHT L5-S1;  Surgeon: Daniel Garibay MD;  [facility]: [facility];  Service: Neurosurgery;  Laterality: Right;    OTOPLASTY Bilateral 2001    PELVIC LAPAROSCOPY  2023    SKIN BIOPSY      TONSILLECTOMY  2006    WISDOM TOOTH EXTRACTION  2020       Family History   Problem Relation Age of Onset    No Known Problems Mother     Hypertension Father     Hyperlipidemia Father     Cancer Maternal Grandmother     Cancer Paternal Grandmother     Breast cancer Paternal Grandmother     Prostate cancer Paternal Grandfather         Social History     Tobacco Use    Smoking status: Never     Passive exposure: Never    Smokeless tobacco: Never   Vaping Use    Vaping status: Never Used   Substance Use Topics    Alcohol use: Not Currently    Drug use: Never       (Not in a hospital admission)      Egg-derived products    Current Outpatient Medications on File Prior to Visit   Medication Sig Dispense Refill    citalopram (CeleXA) 40 MG tablet Take 1 tablet by mouth Daily. 90 tablet 3    famotidine (PEPCID) 40 MG tablet Take 1 tablet by mouth every night at bedtime. 90 tablet 3    gabapentin (NEURONTIN) 300 MG capsule TAKE 1 CAPSULE BY MOUTH 3 TIMES A DAY 90 capsule 0    ibuprofen (ADVIL,MOTRIN) 800 MG tablet Take 1 tablet by mouth Every 8 (Eight) Hours As Needed for Mild Pain. 15 tablet 0    lansoprazole (PREVACID) 30 MG capsule Take 1 capsule by mouth Daily. 90 capsule 1     No current facility-administered medications on file prior to visit.       Social History    Tobacco Use      Smoking status: Never        Passive exposure: Never      Smokeless tobacco: Never         Objective   /90   Ht 167.6 cm (66\")   Wt 91.6 kg (202 lb)   BMI 32.60 kg/m²     Physical Exam:  General Appearance: alert, pleasant, appears stated age, interactive and cooperative         Medical Decision Making:    I reviewed my most recent note.    Assessment   Endometriosis " determined by laparoscopy  Chronic pelvic pain  Dyspareunia   Dysmenorrhea       Plan    No orders of the defined types were placed in this encounter.      Medication Management: None    Procedures Performed: None    We reviewed her situation in detail today.  We discussed possible medical treatments.  We discussed repeat laparoscopy.  She would like to start with laparoscopic intervention and then resume discussions about medication.  Plan for diagnostic laparoscopy, possible ablation of endometriosis and all other indicated procedures.  Risks for the procedure were reviewed in detail today.  All questions answered.    Toribio Garcia MD  Obstetrics and Gynecology  [facility]

## 2025-02-25 RX ORDER — SECUKINUMAB 300 MG/2ML
INJECTION SUBCUTANEOUS
COMMUNITY
Start: 2024-12-12

## 2025-02-27 ENCOUNTER — PRE-ADMISSION TESTING (OUTPATIENT)
Dept: PREADMISSION TESTING | Facility: HOSPITAL | Age: 30
End: 2025-02-27
Payer: COMMERCIAL

## 2025-02-27 VITALS — WEIGHT: 197.6 LBS | HEIGHT: 66 IN | BODY MASS INDEX: 31.76 KG/M2

## 2025-02-27 DIAGNOSIS — N80.9 ENDOMETRIOSIS DETERMINED BY LAPAROSCOPY: ICD-10-CM

## 2025-02-27 LAB
ANION GAP SERPL CALCULATED.3IONS-SCNC: 8.8 MMOL/L (ref 5–15)
BASOPHILS # BLD AUTO: 0.02 10*3/MM3 (ref 0–0.2)
BASOPHILS NFR BLD AUTO: 0.5 % (ref 0–1.5)
BILIRUB UR QL STRIP: NEGATIVE
BUN SERPL-MCNC: 9 MG/DL (ref 6–20)
BUN/CREAT SERPL: 11.7 (ref 7–25)
CALCIUM SPEC-SCNC: 9 MG/DL (ref 8.6–10.5)
CHLORIDE SERPL-SCNC: 101 MMOL/L (ref 98–107)
CLARITY UR: CLEAR
CO2 SERPL-SCNC: 26.2 MMOL/L (ref 22–29)
COLOR UR: YELLOW
CREAT SERPL-MCNC: 0.77 MG/DL (ref 0.57–1)
DEPRECATED RDW RBC AUTO: 42.9 FL (ref 37–54)
EGFRCR SERPLBLD CKD-EPI 2021: 107.9 ML/MIN/1.73
EOSINOPHIL # BLD AUTO: 0.18 10*3/MM3 (ref 0–0.4)
EOSINOPHIL NFR BLD AUTO: 4.2 % (ref 0.3–6.2)
ERYTHROCYTE [DISTWIDTH] IN BLOOD BY AUTOMATED COUNT: 13.4 % (ref 12.3–15.4)
GLUCOSE SERPL-MCNC: 105 MG/DL (ref 65–99)
GLUCOSE UR STRIP-MCNC: NEGATIVE MG/DL
HCT VFR BLD AUTO: 36.4 % (ref 34–46.6)
HGB BLD-MCNC: 12 G/DL (ref 12–15.9)
HGB UR QL STRIP.AUTO: NEGATIVE
IMM GRANULOCYTES # BLD AUTO: 0.01 10*3/MM3 (ref 0–0.05)
IMM GRANULOCYTES NFR BLD AUTO: 0.2 % (ref 0–0.5)
KETONES UR QL STRIP: NEGATIVE
LEUKOCYTE ESTERASE UR QL STRIP.AUTO: NEGATIVE
LYMPHOCYTES # BLD AUTO: 1.29 10*3/MM3 (ref 0.7–3.1)
LYMPHOCYTES NFR BLD AUTO: 30 % (ref 19.6–45.3)
MCH RBC QN AUTO: 29 PG (ref 26.6–33)
MCHC RBC AUTO-ENTMCNC: 33 G/DL (ref 31.5–35.7)
MCV RBC AUTO: 87.9 FL (ref 79–97)
MONOCYTES # BLD AUTO: 0.41 10*3/MM3 (ref 0.1–0.9)
MONOCYTES NFR BLD AUTO: 9.5 % (ref 5–12)
NEUTROPHILS NFR BLD AUTO: 2.39 10*3/MM3 (ref 1.7–7)
NEUTROPHILS NFR BLD AUTO: 55.6 % (ref 42.7–76)
NITRITE UR QL STRIP: NEGATIVE
NRBC BLD AUTO-RTO: 0 /100 WBC (ref 0–0.2)
PH UR STRIP.AUTO: 7.5 [PH] (ref 5–8)
PLATELET # BLD AUTO: 279 10*3/MM3 (ref 140–450)
PMV BLD AUTO: 10 FL (ref 6–12)
POTASSIUM SERPL-SCNC: 4.1 MMOL/L (ref 3.5–5.2)
PROT UR QL STRIP: NEGATIVE
RBC # BLD AUTO: 4.14 10*6/MM3 (ref 3.77–5.28)
SODIUM SERPL-SCNC: 136 MMOL/L (ref 136–145)
SP GR UR STRIP: 1.01 (ref 1–1.03)
UROBILINOGEN UR QL STRIP: NORMAL
WBC NRBC COR # BLD AUTO: 4.3 10*3/MM3 (ref 3.4–10.8)

## 2025-02-27 PROCEDURE — 81003 URINALYSIS AUTO W/O SCOPE: CPT

## 2025-02-27 PROCEDURE — 85025 COMPLETE CBC W/AUTO DIFF WBC: CPT

## 2025-02-27 PROCEDURE — 80048 BASIC METABOLIC PNL TOTAL CA: CPT

## 2025-02-27 PROCEDURE — 36415 COLL VENOUS BLD VENIPUNCTURE: CPT

## 2025-02-27 NOTE — DISCHARGE INSTRUCTIONS
Pre-Admission testing appointment completed today for patient's upcoming procedure here at [facility].    PAT PASS reviewed with patient and they verbalize understanding of the following:     Do not eat or drink anything after midnight the night before procedure unless otherwise instructed by physician/surgeon's office, this includes no gum, candy, mints, tobacco products or e-cigarettes.  Do not shave the area to be operated on at least 48 hours prior to procedure.  Do not wear makeup, lotion, hair products, or nail polish.  Do not wear any jewelry and remove all piercings.  Do not wear any adhesive if you wear dentures.  Do not wear contacts; bring in glasses if needed.  Only take medications on the morning of procedure as instructed by PAT nurse per anesthesia guidelines or as instructed by physician's office.  If you are on any blood thinners reach out to the physician/surgeon's office for instructions on when/if they will need to be stopped prior to procedure.  Bring in picture ID and insurance card, advanced directive copies if applicable, CPAP/BIPAP/Inhalers if indicated morning of procedure, leave any other valuables at home.  Ensure you have arranged for someone to drive you home the day of your procedure and someone to care for you at home afterwards. It is recommended that you do not drive, drink alcohol, or make any major legal decisions for at least 24 hours after your procedure is complete.  Chlorhexadine sponge along with instruction/information sheet given to patient. Readybath wipes given in lieu of CHG if patient has CHG allergy. Instructed patient to date, time, and initial the verification sheet once skin prep has been completed, and to return to [facility] the day of the procedure.  ERAS instructions given to patient unless otherwise instructed per surgeon's orders.     Anesthesia FAQ tip sheet given to patient.  Instructions and information given to patient about parking, hospital  entrance, and registration location.

## 2025-03-12 ENCOUNTER — ANESTHESIA EVENT (OUTPATIENT)
Dept: PERIOP | Facility: HOSPITAL | Age: 30
End: 2025-03-12
Payer: COMMERCIAL

## 2025-03-12 NOTE — ANESTHESIA PREPROCEDURE EVALUATION
Anesthesia Evaluation     Patient summary reviewed and Nursing notes reviewed   NPO Solid Status: > 8 hours  NPO Liquid Status: > 2 hours           Airway   Mallampati: II  TM distance: >3 FB  Neck ROM: full  No difficulty expected and Possible difficult intubation  Dental - normal exam     Pulmonary - normal exam   Cardiovascular - normal exam    ECG reviewed  Rhythm: regular  Rate: normal    (+) hypertension    ROS comment: Test Reason : PREOP  Blood Pressure :   */*   mmHG  Vent. Rate :  70 BPM     Atrial Rate :  70 BPM     P-R Int : 154 ms          QRS Dur :  72 ms      QT Int : 370 ms       P-R-T Axes :  54  92  37 degrees     QTc Int : 399 ms     Normal sinus rhythm  Rightward axis  Borderline ECG  No previous ECGs available  Confirmed by MP BRISCOE MD ([id number]) on 5/20/2023 10:05:59 AM           Neuro/Psych  (+) headaches, psychiatric history Anxiety    ROS Comment: HX DURAL TEAR  GI/Hepatic/Renal/Endo    (+) obesity (BMI 31.89), hiatal hernia, GERD    Musculoskeletal     (+) back pain (HNP)  Abdominal   (+) obese   Substance History      OB/GYN          Other   blood dyscrasia anemia,                   Anesthesia Plan    ASA 3     general     (Risks and benefits discussed including risk of aspiration, recall and dental damage. All patient questions answered.    Will continue with plan of care.)  intravenous induction     Anesthetic plan, risks, benefits, and alternatives have been provided, discussed and informed consent has been obtained with: patient.  Pre-procedure education provided    CODE STATUS:

## 2025-03-13 ENCOUNTER — HOSPITAL ENCOUNTER (OUTPATIENT)
Facility: HOSPITAL | Age: 30
Setting detail: HOSPITAL OUTPATIENT SURGERY
Discharge: HOME | End: 2025-03-13
Attending: OBSTETRICS & GYNECOLOGY | Admitting: OBSTETRICS & GYNECOLOGY
Payer: COMMERCIAL

## 2025-03-13 ENCOUNTER — ANESTHESIA (OUTPATIENT)
Dept: PERIOP | Facility: HOSPITAL | Age: 30
End: 2025-03-13
Payer: COMMERCIAL

## 2025-03-13 VITALS
DIASTOLIC BLOOD PRESSURE: 78 MMHG | HEART RATE: 100 BPM | OXYGEN SATURATION: 98 % | TEMPERATURE: 99 F | RESPIRATION RATE: 16 BRPM | SYSTOLIC BLOOD PRESSURE: 122 MMHG

## 2025-03-13 DIAGNOSIS — N80.9 ENDOMETRIOSIS DETERMINED BY LAPAROSCOPY: ICD-10-CM

## 2025-03-13 PROCEDURE — 25010000002 PROPOFOL 10 MG/ML EMULSION: Performed by: NURSE ANESTHETIST, CERTIFIED REGISTERED

## 2025-03-13 PROCEDURE — 25010000002 LIDOCAINE (CARDIAC): Performed by: NURSE ANESTHETIST, CERTIFIED REGISTERED

## 2025-03-13 PROCEDURE — 25010000002 ONDANSETRON PER 1 MG: Performed by: NURSE ANESTHETIST, CERTIFIED REGISTERED

## 2025-03-13 PROCEDURE — 25010000002 DEXAMETHASONE PER 1 MG: Performed by: NURSE ANESTHETIST, CERTIFIED REGISTERED

## 2025-03-13 PROCEDURE — 25010000002 SUGAMMADEX 200 MG/2ML SOLUTION: Performed by: NURSE ANESTHETIST, CERTIFIED REGISTERED

## 2025-03-13 PROCEDURE — 25010000002 FENTANYL CITRATE (PF) 50 MCG/ML SOLUTION PREFILLED SYRINGE: Performed by: NURSE ANESTHETIST, CERTIFIED REGISTERED

## 2025-03-13 PROCEDURE — 25010000002 SUCCINYLCHOLINE PER 20 MG: Performed by: NURSE ANESTHETIST, CERTIFIED REGISTERED

## 2025-03-13 PROCEDURE — 25010000002 GLYCOPYRROLATE PF 0.4 MG/2ML SOLUTION: Performed by: NURSE ANESTHETIST, CERTIFIED REGISTERED

## 2025-03-13 PROCEDURE — 25010000002 MIDAZOLAM PER 1MG: Performed by: NURSE ANESTHETIST, CERTIFIED REGISTERED

## 2025-03-13 PROCEDURE — 25810000003 LACTATED RINGERS PER 1000 ML: Performed by: NURSE ANESTHETIST, CERTIFIED REGISTERED

## 2025-03-13 DEVICE — SEAL HEMO SURG ARISTA/AH ABS/PWDR 5GM: Type: IMPLANTABLE DEVICE | Site: ABDOMEN | Status: FUNCTIONAL

## 2025-03-13 RX ORDER — LABETALOL HYDROCHLORIDE 5 MG/ML
5 INJECTION, SOLUTION INTRAVENOUS
Status: DISCONTINUED | OUTPATIENT
Start: 2025-03-13 | End: 2025-03-13 | Stop reason: HOSPADM

## 2025-03-13 RX ORDER — SODIUM CHLORIDE 0.9 % (FLUSH) 0.9 %
10 SYRINGE (ML) INJECTION AS NEEDED
Status: DISCONTINUED | OUTPATIENT
Start: 2025-03-13 | End: 2025-03-13 | Stop reason: HOSPADM

## 2025-03-13 RX ORDER — ROCURONIUM BROMIDE 50 MG/5 ML
SYRINGE (ML) INTRAVENOUS AS NEEDED
Status: DISCONTINUED | OUTPATIENT
Start: 2025-03-13 | End: 2025-03-13 | Stop reason: SURG

## 2025-03-13 RX ORDER — IBUPROFEN 600 MG/1
600 TABLET, FILM COATED ORAL EVERY 6 HOURS PRN
Status: DISCONTINUED | OUTPATIENT
Start: 2025-03-13 | End: 2025-03-13 | Stop reason: HOSPADM

## 2025-03-13 RX ORDER — MIDAZOLAM HYDROCHLORIDE 2 MG/2ML
1 INJECTION, SOLUTION INTRAMUSCULAR; INTRAVENOUS
Status: DISCONTINUED | OUTPATIENT
Start: 2025-03-13 | End: 2025-03-13 | Stop reason: HOSPADM

## 2025-03-13 RX ORDER — DEXAMETHASONE SODIUM PHOSPHATE 4 MG/ML
INJECTION, SOLUTION INTRA-ARTICULAR; INTRALESIONAL; INTRAMUSCULAR; INTRAVENOUS; SOFT TISSUE AS NEEDED
Status: DISCONTINUED | OUTPATIENT
Start: 2025-03-13 | End: 2025-03-13 | Stop reason: SURG

## 2025-03-13 RX ORDER — ACETAMINOPHEN 500 MG
1000 TABLET ORAL ONCE
Status: COMPLETED | OUTPATIENT
Start: 2025-03-13 | End: 2025-03-13

## 2025-03-13 RX ORDER — HYDRALAZINE HYDROCHLORIDE 20 MG/ML
5 INJECTION INTRAMUSCULAR; INTRAVENOUS
Status: DISCONTINUED | OUTPATIENT
Start: 2025-03-13 | End: 2025-03-13 | Stop reason: HOSPADM

## 2025-03-13 RX ORDER — ONDANSETRON 2 MG/ML
INJECTION INTRAMUSCULAR; INTRAVENOUS AS NEEDED
Status: DISCONTINUED | OUTPATIENT
Start: 2025-03-13 | End: 2025-03-13 | Stop reason: SURG

## 2025-03-13 RX ORDER — MAGNESIUM HYDROXIDE 1200 MG/15ML
LIQUID ORAL AS NEEDED
Status: DISCONTINUED | OUTPATIENT
Start: 2025-03-13 | End: 2025-03-13 | Stop reason: HOSPADM

## 2025-03-13 RX ORDER — IBUPROFEN 800 MG/1
800 TABLET, FILM COATED ORAL EVERY 8 HOURS PRN
Qty: 30 TABLET | Refills: 0 | Status: SHIPPED | OUTPATIENT
Start: 2025-03-13

## 2025-03-13 RX ORDER — MIDAZOLAM HYDROCHLORIDE 2 MG/2ML
INJECTION, SOLUTION INTRAMUSCULAR; INTRAVENOUS AS NEEDED
Status: DISCONTINUED | OUTPATIENT
Start: 2025-03-13 | End: 2025-03-13 | Stop reason: SURG

## 2025-03-13 RX ORDER — OXYCODONE HYDROCHLORIDE 5 MG/1
5 TABLET ORAL EVERY 6 HOURS PRN
Qty: 5 TABLET | Refills: 0 | Status: SHIPPED | OUTPATIENT
Start: 2025-03-13

## 2025-03-13 RX ORDER — ONDANSETRON 2 MG/ML
4 INJECTION INTRAMUSCULAR; INTRAVENOUS ONCE AS NEEDED
Status: DISCONTINUED | OUTPATIENT
Start: 2025-03-13 | End: 2025-03-13 | Stop reason: HOSPADM

## 2025-03-13 RX ORDER — SUCCINYLCHOLINE CHLORIDE 20 MG/ML
INJECTION INTRAMUSCULAR; INTRAVENOUS AS NEEDED
Status: DISCONTINUED | OUTPATIENT
Start: 2025-03-13 | End: 2025-03-13 | Stop reason: SURG

## 2025-03-13 RX ORDER — IPRATROPIUM BROMIDE AND ALBUTEROL SULFATE 2.5; .5 MG/3ML; MG/3ML
3 SOLUTION RESPIRATORY (INHALATION) ONCE AS NEEDED
Status: DISCONTINUED | OUTPATIENT
Start: 2025-03-13 | End: 2025-03-13 | Stop reason: HOSPADM

## 2025-03-13 RX ORDER — SODIUM CHLORIDE, SODIUM LACTATE, POTASSIUM CHLORIDE, CALCIUM CHLORIDE 600; 310; 30; 20 MG/100ML; MG/100ML; MG/100ML; MG/100ML
INJECTION, SOLUTION INTRAVENOUS CONTINUOUS PRN
Status: DISCONTINUED | OUTPATIENT
Start: 2025-03-13 | End: 2025-03-13 | Stop reason: SURG

## 2025-03-13 RX ORDER — ACETAMINOPHEN 500 MG
1000 TABLET ORAL EVERY 8 HOURS PRN
Qty: 60 TABLET | Refills: 0 | Status: SHIPPED | OUTPATIENT
Start: 2025-03-13 | End: 2026-03-13

## 2025-03-13 RX ORDER — HYDROCODONE BITARTRATE AND ACETAMINOPHEN 5; 325 MG/1; MG/1
1 TABLET ORAL ONCE AS NEEDED
Status: DISCONTINUED | OUTPATIENT
Start: 2025-03-13 | End: 2025-03-13 | Stop reason: HOSPADM

## 2025-03-13 RX ORDER — PROPOFOL 10 MG/ML
VIAL (ML) INTRAVENOUS AS NEEDED
Status: DISCONTINUED | OUTPATIENT
Start: 2025-03-13 | End: 2025-03-13 | Stop reason: SURG

## 2025-03-13 RX ORDER — FENTANYL CITRATE 50 UG/ML
INJECTION, SOLUTION INTRAMUSCULAR; INTRAVENOUS AS NEEDED
Status: DISCONTINUED | OUTPATIENT
Start: 2025-03-13 | End: 2025-03-13 | Stop reason: SURG

## 2025-03-13 RX ORDER — SODIUM CHLORIDE 9 MG/ML
40 INJECTION, SOLUTION INTRAVENOUS AS NEEDED
Status: DISCONTINUED | OUTPATIENT
Start: 2025-03-13 | End: 2025-03-13 | Stop reason: HOSPADM

## 2025-03-13 RX ORDER — SODIUM CHLORIDE 0.9 % (FLUSH) 0.9 %
3 SYRINGE (ML) INJECTION EVERY 12 HOURS SCHEDULED
Status: DISCONTINUED | OUTPATIENT
Start: 2025-03-13 | End: 2025-03-13 | Stop reason: HOSPADM

## 2025-03-13 RX ORDER — MEPERIDINE HYDROCHLORIDE 25 MG/ML
12.5 INJECTION INTRAMUSCULAR; INTRAVENOUS; SUBCUTANEOUS
Status: DISCONTINUED | OUTPATIENT
Start: 2025-03-13 | End: 2025-03-13 | Stop reason: HOSPADM

## 2025-03-13 RX ORDER — PROCHLORPERAZINE EDISYLATE 5 MG/ML
10 INJECTION INTRAMUSCULAR; INTRAVENOUS ONCE AS NEEDED
Status: DISCONTINUED | OUTPATIENT
Start: 2025-03-13 | End: 2025-03-13 | Stop reason: HOSPADM

## 2025-03-13 RX ADMIN — FENTANYL CITRATE 50 MCG: 50 INJECTION, SOLUTION INTRAMUSCULAR; INTRAVENOUS at 09:27

## 2025-03-13 RX ADMIN — Medication 10 MG: at 09:22

## 2025-03-13 RX ADMIN — SODIUM CHLORIDE, POTASSIUM CHLORIDE, SODIUM LACTATE AND CALCIUM CHLORIDE: 600; 310; 30; 20 INJECTION, SOLUTION INTRAVENOUS at 10:06

## 2025-03-13 RX ADMIN — GLYCOPYRROLATE 0.1 MCG: 0.2 INJECTION, SOLUTION INTRAMUSCULAR; INTRAVENOUS at 09:54

## 2025-03-13 RX ADMIN — SUCCINYLCHOLINE CHLORIDE 140 MG: 20 INJECTION, SOLUTION INTRAMUSCULAR; INTRAVENOUS at 09:22

## 2025-03-13 RX ADMIN — DEXAMETHASONE SODIUM PHOSPHATE 8 MG: 4 INJECTION, SOLUTION INTRA-ARTICULAR; INTRALESIONAL; INTRAMUSCULAR; INTRAVENOUS; SOFT TISSUE at 10:05

## 2025-03-13 RX ADMIN — PROPOFOL 200 MG: 10 INJECTION, EMULSION INTRAVENOUS at 09:22

## 2025-03-13 RX ADMIN — ACETAMINOPHEN 1000 MG: 500 TABLET ORAL at 09:13

## 2025-03-13 RX ADMIN — MIDAZOLAM HYDROCHLORIDE 1 MG: 1 INJECTION, SOLUTION INTRAMUSCULAR; INTRAVENOUS at 09:37

## 2025-03-13 RX ADMIN — LIDOCAINE HYDROCHLORIDE 60 MG: 20 INJECTION, SOLUTION INTRAVENOUS at 09:22

## 2025-03-13 RX ADMIN — FENTANYL CITRATE 50 MCG: 50 INJECTION, SOLUTION INTRAMUSCULAR; INTRAVENOUS at 10:20

## 2025-03-13 RX ADMIN — SUGAMMADEX 200 MG: 100 INJECTION, SOLUTION INTRAVENOUS at 10:05

## 2025-03-13 RX ADMIN — ONDANSETRON 4 MG: 2 INJECTION INTRAMUSCULAR; INTRAVENOUS at 10:05

## 2025-03-13 RX ADMIN — Medication 40 MG: at 09:31

## 2025-03-13 RX ADMIN — SODIUM CHLORIDE, POTASSIUM CHLORIDE, SODIUM LACTATE AND CALCIUM CHLORIDE: 600; 310; 30; 20 INJECTION, SOLUTION INTRAVENOUS at 09:19

## 2025-03-13 NOTE — H&P
GYNECOLOGY HISTORY AND PHYSICAL    CHIEF COMPLAINT: Scheduled surgery    DIAGNOSIS:  Endometriosis determined by laparoscopy  Chronic pelvic pain  Dyspareunia   Dysmenorrhea    ASSESSMENT/PLAN     29 y.o.  female who presents for scheduled diagnostic laparoscopy, possible ablation of endometriosis and all other indicated procedures.    - Proceed to the OR for scheduled surgery  - Risks for the procedure reviewed  - SCDs for DVT ppx  - Antibiotics: none    HISTORY OF PRESENT ILLNESS     29 y.o.  female who presents for the scheduled procedure listed above.    She has no complaints today and there are no interval changes to the history.    REVIEW OF SYSTEMS: A complete review of systems was performed and was specifically negative for headache, changes in vision, RUQ pain, shortness of breath, chest pain, lower extremity edema and dysuria.     HISTORY:  Obstetrical History:  OB History    Para Term  AB Living   0 0 0 0 0 0   SAB IAB Ectopic Molar Multiple Live Births   0 0 0 0 0 0       Past Medical History:  Past Medical History:   Diagnosis Date    Anemia     Anxiety 6 months ago    Endometriosis determined by laparoscopy 2023    Female infertility 2022    not officially been diagnosed with infertility just going based off of no pregnancy occurring after trying for a year plus    GERD (gastroesophageal reflux disease)     Headache     Hernia 2018    Hiatal    Hydradenitis supp     Hypertension     Borderline    Low back pain     MRSA infection     2015, under breasts due to HS    Ovarian cyst 03/15/2024    PMS (premenstrual syndrome) 2008    Seasonal allergies     Varicella     Wears glasses        Past Surgical History:  Past Surgical History:   Procedure Laterality Date    ADENOIDECTOMY  2006    DIAGNOSTIC LAPAROSCOPY N/A 2023    Procedure: DIAGNOSTIC LAPAROSCOPY, POSSIBLE ABLATION OF ENDOMETRIOSIS, AND LEFT OVARIAN CYSTECTOMY;  Surgeon: Toribio Garcia MD;   Location: [facility] OR;  Service: Obstetrics/Gynecology;  Laterality: N/A;    ENDOSCOPY  02/20/2019    Dr. Feliciano- 1 cm hiatal hernia, mild esophagitis, bile aspirate taken for crystal analysis and negative. No other path available.    ENDOSCOPY N/A 03/24/2023    Procedure: ESOPHAGOGASTRODUODENOSCOPY WITH BIOPSY ;  Surgeon: Garo Limon MD;  Location: [facility] ENDOSCOPY;  Service: Gastroenterology;  Laterality: N/A;    KNEE SURGERY Left     ACL & meniscus repair- has screws implanted    LUMBAR DISCECTOMY Right 05/25/2023    Procedure: LUMBAR FORAMINOTOMY RIGHT L5-S1;  Surgeon: Daniel Garibay MD;  Location: [facility] OR;  Service: Neurosurgery;  Laterality: Right;    OTOPLASTY Bilateral 2001    SKIN BIOPSY      TONSILLECTOMY  2006    WISDOM TOOTH EXTRACTION  2020       Social History:  Social History     Tobacco Use    Smoking status: Never     Passive exposure: Never    Smokeless tobacco: Never   Vaping Use    Vaping status: Never Used   Substance Use Topics    Alcohol use: Not Currently    Drug use: Never       Family History  Family History   Problem Relation Age of Onset    No Known Problems Mother     Hypertension Father     Hyperlipidemia Father     Cancer Maternal Grandmother     Cancer Paternal Grandmother     Breast cancer Paternal Grandmother     Prostate cancer Paternal Grandfather         Allergies:   Allergies   Allergen Reactions    Egg-Derived Products Other (See Comments)     Sneezing       OBJECTIVE   VITALS:  Temp:  [98 °F (36.7 °C)] 98 °F (36.7 °C)  Heart Rate:  [94] 94  Resp:  [16] 16  BP: (143)/(95) 143/95    PHYSICAL EXAM:  GENERAL: NAD, alert  CHEST: No increased work of breathing, CTAB  CV: RRR, WWP  ABDOMEN: Soft, NTTP  EXTREMITIES:  Warm and well-perfused, nontender, nonedematous  NEURO: AAO x 3, CN II-XII grossly intact    No current facility-administered medications on file prior to encounter.     Current Outpatient Medications on File Prior to Encounter   Medication Sig Dispense  "Refill    citalopram (CeleXA) 40 MG tablet Take 1 tablet by mouth Daily. 90 tablet 3    famotidine (PEPCID) 40 MG tablet Take 1 tablet by mouth every night at bedtime. 90 tablet 3    gabapentin (NEURONTIN) 300 MG capsule TAKE 1 CAPSULE BY MOUTH 3 TIMES A DAY 90 capsule 0    ibuprofen (ADVIL,MOTRIN) 800 MG tablet Take 1 tablet by mouth Every 8 (Eight) Hours As Needed for Mild Pain. 15 tablet 0    lansoprazole (PREVACID) 30 MG capsule Take 1 capsule by mouth Daily. 90 capsule 1       DIAGNOSTIC STUDIES:        Invalid input(s): \"LABALB\", \"UA\"    No results found for this or any previous visit (from the past 24 hours).    Toribio Garcia MD  Obstetrics and Gynecology  [facility]     "

## 2025-03-13 NOTE — ANESTHESIA PROCEDURE NOTES
Airway  Reason: elective    Date/Time: 3/13/2025 9:23 AM  Airway not difficult    General Information and Staff    Patient location during procedure: OR  CRNA/CAA: Kat Meyer CRNA    Indications and Patient Condition  Indications for airway management: airway protection    Preoxygenated: yes  MILS maintained throughout    Mask difficulty assessment: 1 - vent by mask    Final Airway Details    Final airway type: endotracheal airway      Successful airway: ETT  Cuffed: yes   Successful intubation technique: direct laryngoscopy  Endotracheal tube insertion site: oral  Blade: Fung  Blade size: 3  ETT size (mm): 7.0  Cormack-Lehane Classification: grade I - full view of glottis  Placement verified by: chest auscultation and capnometry   Cuff volume (mL): 6  Measured from: teeth  ETT/EBT  to teeth (cm): 21  Number of attempts at approach: 1  Assessment: lips, teeth, and gum same as pre-op and atraumatic intubation    Additional Comments  AOI x 1 PASS, +ETCO2, +R/F/C. MOUTH TEETH GUMS SAME AS PREOP

## 2025-03-13 NOTE — OP NOTE
Ben Monte  : 1995  MRN: [id number]  CSN: [id number]  Date: 3/13/2025    Operative Report    Pre-op Diagnosis:  Endometriosis determined by laparoscopy [N80.9]  Chronic pelvic pain  Dyspareunia   Dysmenorrhea   Post-op Diagnosis:  Same   Procedure: DIAGNOSTIC LAPAROSCOPY  EXCISION OF ENDOMETRIOSIS  LYSIS OF ADHESIONS   Surgeon:  Surgical assistant: MARY GRACE Shine was responsible for performing the following activities: Retraction, Closing, Placing Dressing, and manipulation of laparoscopic instruments  and their skilled assistance was necessary for the success of this case.     OR Staff: Circulator: Natacha Mcnulty RN; Jackie Lopez RN  Scrub Person: Aiyana Nath, EFFIE; Daphney Ruffin     Anesthesia: General   Estimated Blood Loss: 5 mls   ABx: none   Specimens:  Left pelvic sidewall endometriosis       Complications: None         Findings:   Normal pelvic anatomy.  Adhesions between the left ovary and left pelvic sidewall.  Large collection of endometriosis along the left pelvic sidewall.    Description of Procedure:   After the appropriate time out and adequate dosing of her anesthesia, the patient was prepped and draped in the usual sterile fashion. The patient was placed in the dorsal lithotomy position using Felipe stirrups. A tracey catheter had been placed in the bladder for drainage during the procedure. A sponge stick was placed in the vagina for uterine manipulation. The manipulator was covered over with a sterile towel.     Attention was then turned to the abdomen. An infraumbilical skin incision was made with the scalpel and a 5 mm trocar was inserted under direct visualization without any complications. The abdomen was insufflated with CO2 being sure to keep the pressure less than 15 mmHg. The patient was placed in Trendelenburg position. Two 5 mm trocars were then inserted on the patient's left side with the aid of transillumination and after  identification of the inferior epigastric vessels. The ports were placed under direct visualization without any complications and all entry sites were inspected and found to be atraumatic. The abdomen and pelvis were then explored with the above findings noted.     The harmonic device was used to take down the adhesions between the left ovary and left pelvic sidewall.  The collection of endometriosis along the left pelvic sidewall was then carefully excised, removed from the patient and sent to pathology for review.  Hemostasis was achieved with Geovanny. Adequate hemostasis was noted at all surgical sites.  The pelvis was irrigated and suctioned. All instruments were removed from the patient and all trocars were removed under direct visualization. The pneumoperitoneum was evacuated and the skin incisions were made hemostatic with the Bovie. All skin incisions were closed with a 3-0 Monocryl suture in a subcuticular fashion. Steri-Strips were applied. All counts were correct at the end of the procedure.  There were no complications. The patient tolerated the procedure well.  She was taken to the postoperative recovery room in stable condition.    Toribio Garcia MD  Obstetrics and Gynecology  [facility]

## 2025-03-13 NOTE — ANESTHESIA POSTPROCEDURE EVALUATION
Patient: Bertha Monte    Procedure Summary       Date: 03/13/25 Room / Location: [facility] OR 1 /  [facility] OR    Anesthesia Start: 0919 Anesthesia Stop: 1019    Procedure: DIAGNOSTIC LAPAROSCOPY, LYSIS OF ADHESIONS, EXCISION OF ENDOMETRIOSIS (Abdomen) Diagnosis:       Endometriosis determined by laparoscopy      (Endometriosis determined by laparoscopy [N80.9])    Surgeons: Toribio Garcia MD Provider: Kat Meyer CRNA    Anesthesia Type: general ASA Status: 3            Anesthesia Type: general    Vitals  Vitals Value Taken Time   /83 03/13/25 11:20   Temp 97.8 °F (36.6 °C) 03/13/25 11:20   Pulse 96 03/13/25 11:23   Resp 16 03/13/25 11:20   SpO2 97 % 03/13/25 11:23   Vitals shown include unfiled device data.        Post Anesthesia Care and Evaluation    Patient location during evaluation: PACU  Patient participation: complete - patient participated  Level of consciousness: sleepy but conscious  Pain score: 2  Pain management: satisfactory to patient    Airway patency: patent  Anesthetic complications: No anesthetic complications  PONV Status: none  Cardiovascular status: acceptable and stable  Respiratory status: acceptable  Hydration status: acceptable    Comments: Vitals signs as noted in nursing documentation as per protocol.

## 2025-03-13 NOTE — DISCHARGE INSTRUCTIONS
Pelvic Surgery  Home Care Instructions:  Dr. Toribio Garcia      Thank you for choosing [facility]. Please let us know if you have questions or concerns or do not understand the information we give you. Always ask us to explain words or phrases you do not understand.    You have had pelvic surgery. Here are some basic guidelines to help you recover more quickly at home. Your doctor may give you more guidelines. If you have any questions after you go home, please call the numbers listed on the next page.    General Guidelines    1. You may resume normal daily activities.  2. Do not put anything in the vagina (no tampons or douching).    3.   Do not have sex until cleared by your physician.  4.   You may climb steps.  5. You may bathe or shower.  6. The only exercise you should do is walking. This is important for your recovery.  7. Do not drive while taking narcotics.  8. Take the medicines you were taking before surgery. Other medicines you need to take at home are:    Alternate Motrin and Tylenol around the clock. Take each every 8 hours in alternation so that you are getting one of the medications every 4 hours.   Roxicodone 5mg tab  - One tablet by mouth every 4-6 hours as needed for breakthrough pain   Metamucil + Colace daily to keep bowel movements soft        If you have questions about your medicines, let us know. Or, talk to your local pharmacist.    Surgery, lack of activity, pain medicines and iron pills tend to cause constipation. Take something every day to prevent constipation and straining.  Taking something like Metamucil® every day to increase fiber may prevent constipation. Follow the instructions on the container. If you need a gentle laxative, then something like Milk of Magnesia® or Correctol® work fairly well. You should not need to take laxatives often.    10. Call your doctor if you have:     a. Fever of 101 degrees or higher.  b. Heavy vaginal bleeding.  c. Increased redness  around your incision (cut); incision pulling apart; drainage (pus), bleeding, or a large amount of fluid from your incision.  d. Worsening nausea or pain.  e. Other problems or concern.    If you have any questions or concerns about your usual routines, please talk to the nurse or doctor.       To talk with your doctor at [facility], call:  Obstetrics and Gynecology Outpatient Clinic  Phone: [phone]      We appreciate the opportunity you have given us to participate in your care.     --------------------------------------------------------------------------------------------------------------------------------------------------------------------------    No pushing, pulling, tugging,  heavy lifting, or strenuous activity.  No major decision making, driving, or drinking alcoholic beverages for 24 hours. ( due to the medications you have  received)  Always use good hand hygiene/washing techniques.  NO driving while taking pain medications.    * if you have an incision:  Check your incision area every day for signs of infection.   Check for:  * more redness, swelling, or pain  *more fluid or blood  *warmth  *pus or bad smell.    To assist you in voiding:  Drink plenty of fluids  Listen to running water while attempting to void.    If you are unable to urinate and you have an uncomfortable urge to void or it has been   6 hours since you were discharged, return to the Emergency Room.    May splint incision sites when moving, coughing, sneezing, laughing, and increasing activity as comfort measure.    Apply ice to incision sites as directed per physician. Ice on usually for 20-30 minutes, remove, and reapply.  Do not use ice continuous.    Pelvic rest is best described as not putting anything in your vagina. This includes tampons, douching, tub bathing or sexual activity.

## 2025-03-17 LAB — REF LAB TEST METHOD: NORMAL

## 2025-03-19 ENCOUNTER — OFFICE VISIT (OUTPATIENT)
Dept: OBSTETRICS AND GYNECOLOGY | Facility: CLINIC | Age: 30
End: 2025-03-19
Payer: COMMERCIAL

## 2025-03-19 VITALS
DIASTOLIC BLOOD PRESSURE: 94 MMHG | BODY MASS INDEX: 32.02 KG/M2 | HEIGHT: 66 IN | SYSTOLIC BLOOD PRESSURE: 132 MMHG | WEIGHT: 199.2 LBS

## 2025-03-19 DIAGNOSIS — N80.9 ENDOMETRIOSIS DETERMINED BY LAPAROSCOPY: Primary | ICD-10-CM

## 2025-03-19 PROCEDURE — 99024 POSTOP FOLLOW-UP VISIT: CPT | Performed by: OBSTETRICS & GYNECOLOGY

## 2025-03-19 RX ORDER — ELAGOLIX 150 MG/1
150 TABLET, FILM COATED ORAL DAILY
Qty: 30 TABLET | Refills: 5 | Status: SHIPPED | OUTPATIENT
Start: 2025-03-19

## 2025-03-20 NOTE — PROGRESS NOTES
"Postoperative Assessment Visit    Subjective   Chief Complaint   Patient presents with    Post-op Follow-up     No issues/concerns since procedure        29 y.o.  female who presents for a post-op visit.    Pre-op Diagnosis:  Endometriosis determined by laparoscopy [N80.9]  Chronic pelvic pain  Dyspareunia   Dysmenorrhea   Post-op Diagnosis:  Same   Procedure: DIAGNOSTIC LAPAROSCOPY  EXCISION OF ENDOMETRIOSIS  LYSIS OF ADHESIONS     The patient is doing well with no issues.     Objective   Vitals:    25 1309   BP: 132/94   Weight: 90.4 kg (199 lb 3.2 oz)   Height: 167.6 cm (65.98\")     Physical Exam:  Incision(s): Well-healing, no signs of infection or separation  Reassuring postoperative exam       Medical Decision Making:    I have reviewed the operative note.  I have reviewed the postoperative labs where appropriate.    Assessment   Laparoscopy  Post-op evaluation  Endometriosis     Plan    No orders of the defined types were placed in this encounter.      Medication Management: Orilissa 150 mg daily    Patient is meeting all post-op milestones.  Pathology reviewed - Endo  Surgical findings discussed.  Postoperative precautions and restrictions reviewed.  She would like to pursue medical treatment for her endometriosis at this point.  Start Orilissa as above.  Instructions reviewed.  Call/return precautions reviewed.  All questions answered.    RTC as needed.    Toribio Garcia MD  Obstetrics and Gynecology  [facility]    "

## 2025-03-24 ENCOUNTER — TELEPHONE (OUTPATIENT)
Dept: OBSTETRICS AND GYNECOLOGY | Facility: CLINIC | Age: 30
End: 2025-03-24
Payer: COMMERCIAL

## 2025-03-24 NOTE — TELEPHONE ENCOUNTER
OFELIA HARRISON    [phone]    PT IS NEEDING PRIOR AUTH FOR [facility]

## 2025-03-24 NOTE — TELEPHONE ENCOUNTER
Insurance denied coverage, suggesting Myfembree or a progesterone only oral contraceptive first before it will be covered. Thanks.

## 2025-03-25 DIAGNOSIS — N80.9 ENDOMETRIOSIS DETERMINED BY LAPAROSCOPY: Primary | ICD-10-CM

## 2025-03-25 RX ORDER — RELUGOLIX, ESTRADIOL HEMIHYDRATE, AND NORETHINDRONE ACETATE 40; 1; .5 MG/1; MG/1; MG/1
1 TABLET, FILM COATED ORAL DAILY
Qty: 30 TABLET | Refills: 11 | Status: SHIPPED | OUTPATIENT
Start: 2025-03-25

## 2025-04-02 ENCOUNTER — OFFICE VISIT (OUTPATIENT)
Dept: INTERNAL MEDICINE | Facility: CLINIC | Age: 30
End: 2025-04-02
Payer: COMMERCIAL

## 2025-04-02 VITALS
SYSTOLIC BLOOD PRESSURE: 120 MMHG | DIASTOLIC BLOOD PRESSURE: 90 MMHG | HEIGHT: 66 IN | BODY MASS INDEX: 31.82 KG/M2 | WEIGHT: 198 LBS | HEART RATE: 86 BPM | TEMPERATURE: 97.8 F | OXYGEN SATURATION: 98 %

## 2025-04-02 DIAGNOSIS — Z98.890 STATUS POST LUMBAR SPINE SURGERY FOR DECOMPRESSION OF SPINAL CORD: Primary | ICD-10-CM

## 2025-04-02 DIAGNOSIS — M54.16 LUMBAR RADICULOPATHY: ICD-10-CM

## 2025-04-02 DIAGNOSIS — I10 PRIMARY HYPERTENSION: ICD-10-CM

## 2025-04-02 DIAGNOSIS — K21.9 GASTROESOPHAGEAL REFLUX DISEASE, UNSPECIFIED WHETHER ESOPHAGITIS PRESENT: ICD-10-CM

## 2025-04-02 PROBLEM — L83 ACANTHOSIS NIGRICANS: Status: ACTIVE | Noted: 2025-04-02

## 2025-04-02 RX ORDER — CITALOPRAM HYDROBROMIDE 40 MG/1
40 TABLET ORAL DAILY
Qty: 90 TABLET | Refills: 3 | Status: SHIPPED | OUTPATIENT
Start: 2025-04-02

## 2025-04-02 RX ORDER — MECOBALAMIN 5000 MCG
15 TABLET,DISINTEGRATING ORAL DAILY
Qty: 90 CAPSULE | Refills: 3 | Status: SHIPPED | OUTPATIENT
Start: 2025-04-02

## 2025-04-02 RX ORDER — GABAPENTIN 300 MG/1
300 CAPSULE ORAL 3 TIMES DAILY
Qty: 90 CAPSULE | Refills: 2 | Status: SHIPPED | OUTPATIENT
Start: 2025-04-02

## 2025-04-02 RX ORDER — FAMOTIDINE 40 MG/1
40 TABLET, FILM COATED ORAL
Qty: 90 TABLET | Refills: 3 | Status: SHIPPED | OUTPATIENT
Start: 2025-04-02

## 2025-04-02 NOTE — PROGRESS NOTES
"  Office Visit      Patient Name: Bertha Monte  : 1995   MRN: [id number]   Care Team: Patient Care Team:  Halina Quezada APRN as PCP - General (Family Medicine)  Sofya Tay LPCC as Counselor (Behavioral Health)    Chief Complaint  Back Pain (Follow-up /)    Subjective     Subjective      Bertha Monte presents to [facility] for chronic disease management.   She suffers from chronic back pain s/p decompression by neurosurgery. She is on gabapentin and continues to benefit from the medication. No recent fall , trauma, worsening back pain, weakness in the lower extremities. She wishes to continue medication at this time.     Blood pressure elevated initially in office today. She denies chest pain, shortness of breath, lower extremity swelling, dizziness, and orthopnea. She is not on any medication for this problem. Does not monitor her blood pressure at home. Had recent laparoscopic surgery GYN and was slightly elevated at that time as well. Admits she is under a lot more stress at work.     GERD- she has had several EGD's , last being in the last 5 years. Symptoms are currently managed with lansoprazole in the AM and famotidine in the PM . She is interested in trying to come off lansoprazole if possible. Last EGD showed gastritis but no hiatal hernia. Denies hematochezia, unexpected weight loss, change in bowel habits, or epigastric pain.     Mood is controlled with citalopram.     No acute complaints today.    Objective     Objective   Vital Signs:   /90   Pulse 86   Temp 97.8 °F (36.6 °C)   Ht 167.6 cm (65.98\")   Wt 89.8 kg (198 lb)   SpO2 98%   BMI 31.97 kg/m²     Physical Exam  Vitals and nursing note reviewed.   Constitutional:       General: She is not in acute distress.     Appearance: Normal appearance. She is obese. She is not toxic-appearing.   Eyes:      Pupils: Pupils are equal, round, and reactive to light.   Neck:      Vascular: No " carotid bruit.   Cardiovascular:      Rate and Rhythm: Normal rate and regular rhythm.      Heart sounds: Normal heart sounds. No murmur heard.  Pulmonary:      Effort: Pulmonary effort is normal. No respiratory distress.      Breath sounds: Normal breath sounds. No wheezing.   Abdominal:      General: Bowel sounds are normal. There is no distension.      Palpations: Abdomen is soft.      Tenderness: There is no abdominal tenderness.   Musculoskeletal:         General: Deformity present. No tenderness.      Cervical back: Neck supple. No tenderness.   Skin:     General: Skin is warm and dry.      Findings: No rash.   Neurological:      General: No focal deficit present.      Mental Status: She is alert and oriented to person, place, and time.   Psychiatric:         Mood and Affect: Mood normal.         Behavior: Behavior normal.          Assessment / Plan      Assessment & Plan   Problem List Items Addressed This Visit       Primary hypertension    Slightly elevated in office today, trending down on recheck. Recommend home monitoring for now, DASH diet, exercise as tolerated, and stress management. If consistently >140/90 will follow-up sooner.      Other Visit Diagnoses         Status post lumbar spine surgery for decompression of spinal cord    -  Primary    Relevant Medications    gabapentin (NEURONTIN) 300 MG capsule    Other Relevant Orders    Compliance Drug Analysis, Ur - Urine, Clean Catch      Lumbar radiculopathy        Relevant Medications    gabapentin (NEURONTIN) 300 MG capsule    Other Relevant Orders    Compliance Drug Analysis, Ur - Urine, Clean Catch    Stable, continue gabapentin as prescribed, stretching, and heat PRN.   Continues to benefit from use of the medication.   Again, educated on addictive nature of controlled substances and risk vs. Benefits discussion regarding medication took place today. A joint decision was made to continue the medication at lowest effective dose today.   JASSI  obtained and appropriate. Previous UDS reviewed and appropriate, updated at today's visit.   Follow-up in 6 months for medication check.         Gastroesophageal reflux disease, unspecified whether esophagitis present        Relevant Medications    famotidine (PEPCID) 40 MG tablet    lansoprazole (PREVACID) 15 MG capsule    Stable, we discussed risk vs benefit of PPI given her gastritis, we will trial a lower dose and if breakthrough symptoms she will let me know. We discussed dietary modification at length. She will keep her follow-up in 6 months.             Follow Up   Return in about 6 months (around 10/2/2025) for Annual physical.  Patient was given instructions and counseling regarding her condition or for health maintenance advice. Please see specific information pulled into the AVS if appropriate.     VANCE Kaye  [facility] [place]

## 2025-04-08 LAB — DRUGS UR: NORMAL

## 2025-04-14 RX ORDER — LANSOPRAZOLE 30 MG/1
30 CAPSULE, DELAYED RELEASE ORAL DAILY
Qty: 90 CAPSULE | Refills: 3 | Status: SHIPPED | OUTPATIENT
Start: 2025-04-14

## 2025-04-14 NOTE — TELEPHONE ENCOUNTER
Rx Refill Note  Requested Prescriptions     Pending Prescriptions Disp Refills    lansoprazole (PREVACID) 30 MG capsule [Pharmacy Med Name: LANSOPRAZOLE DR 30 MG CAPSULE] 90 capsule 1     Sig: TAKE 1 CAPSULE BY MOUTH DAILY      Last office visit with prescribing clinician: 4/2/2025   Last telemedicine visit with prescribing clinician: Visit date not found   Next office visit with prescribing clinician: 10/3/2025       Bailey Hastings MA  04/14/25, 16:59 EDT

## 2025-04-24 ENCOUNTER — TELEPHONE (OUTPATIENT)
Dept: OBSTETRICS AND GYNECOLOGY | Facility: CLINIC | Age: 30
End: 2025-04-24
Payer: COMMERCIAL

## 2025-04-24 NOTE — TELEPHONE ENCOUNTER
Recently received another PA request on Orilissa for her after it was denied in March and it came back approved. Called and informed patient of this and she is complaining of a lot of bleeding on the [place] and wanting to know if this may approve if she switches to the Orilissa. Please Regard, Thanks.

## 2025-04-25 DIAGNOSIS — N80.9 ENDOMETRIOSIS DETERMINED BY LAPAROSCOPY: Primary | ICD-10-CM

## 2025-04-25 RX ORDER — ELAGOLIX 150 MG/1
1 TABLET, FILM COATED ORAL DAILY
Qty: 30 TABLET | Refills: 11 | Status: SHIPPED | OUTPATIENT
Start: 2025-04-25

## 2025-08-21 ENCOUNTER — OFFICE VISIT (OUTPATIENT)
Dept: INTERNAL MEDICINE | Facility: CLINIC | Age: 30
End: 2025-08-21
Payer: COMMERCIAL

## 2025-08-21 VITALS
OXYGEN SATURATION: 98 % | SYSTOLIC BLOOD PRESSURE: 122 MMHG | BODY MASS INDEX: 31.66 KG/M2 | DIASTOLIC BLOOD PRESSURE: 86 MMHG | HEART RATE: 80 BPM | RESPIRATION RATE: 16 BRPM | TEMPERATURE: 98.2 F | HEIGHT: 66 IN | WEIGHT: 197 LBS

## 2025-08-21 DIAGNOSIS — F41.1 GENERALIZED ANXIETY DISORDER: Primary | ICD-10-CM

## 2025-08-21 DIAGNOSIS — F51.5 NIGHTMARES: ICD-10-CM

## 2025-08-21 DIAGNOSIS — F51.04 PSYCHOPHYSIOLOGICAL INSOMNIA: ICD-10-CM

## 2025-08-21 PROCEDURE — 99214 OFFICE O/P EST MOD 30 MIN: CPT | Performed by: NURSE PRACTITIONER

## 2025-08-21 RX ORDER — PRAZOSIN HYDROCHLORIDE 1 MG/1
1 CAPSULE ORAL NIGHTLY
Qty: 30 CAPSULE | Refills: 0 | Status: SHIPPED | OUTPATIENT
Start: 2025-08-21

## (undated) DEVICE — ENDOPATH XCEL BLADELESS TROCARS WITH STABILITY SLEEVES: Brand: ENDOPATH XCEL

## (undated) DEVICE — ENDOSCOPY PORT CONNECTOR FOR OLYMPUS® SCOPES: Brand: ERBE

## (undated) DEVICE — HYBRID TUBING/CAP SET FOR OLYMPUS® SCOPES: Brand: ERBE

## (undated) DEVICE — GLV SURG PREMIERPRO MIC LTX PF SZ6.5 BRN

## (undated) DEVICE — SOL IRR NACL 0.9PCT BT 1000ML

## (undated) DEVICE — GLV SURG SENSICARE PI LF PF 7.5 GRN STRL

## (undated) DEVICE — SUT VIC PLS CTD ANTIB BR 3/0 8/18IN 45CM

## (undated) DEVICE — ELECTRD BLD EZ CLN MOD 4IN

## (undated) DEVICE — FRCP BX RADJAW4 NDL 2.8 240 STD OG

## (undated) DEVICE — PK NEURO DISC 10

## (undated) DEVICE — C-ARM DRAPE: Brand: DEROYAL

## (undated) DEVICE — DRSNG WND BORDR/ADHS NONADHR/GZ LF 4X4IN STRL

## (undated) DEVICE — DISPOSABLE MONOPOLAR ENDOSCOPIC CORD 10 FT. (3M): Brand: KIRWAN

## (undated) DEVICE — PAD TRENDELENBURG W/RAIL STRAP

## (undated) DEVICE — SOL IRR NACL 0.9PCT BO 1000ML

## (undated) DEVICE — ANTIBACTERIAL UNDYED BRAIDED (POLYGLACTIN 910), SYNTHETIC ABSORBABLE SUTURE: Brand: COATED VICRYL

## (undated) DEVICE — ELECTRD BLD EZ CLN STD 2.5IN

## (undated) DEVICE — SLV SCD CALF HEMOFORCE DVT THERP REPROC MD

## (undated) DEVICE — 2, DISPOSABLE SUCTION/IRRIGATOR WITHOUT DISPOSABLE TIP: Brand: STRYKEFLOW

## (undated) DEVICE — STRIP,CLOSURE,WOUND,MEDI-STRIP,1/2X4: Brand: MEDLINE

## (undated) DEVICE — TOOL MR8-14MH30D MR8 14CM MATCH DMD 3MM: Brand: MIDAS REX MR8

## (undated) DEVICE — APPL HEMO SURG ARISTA/AH/FLEXITIP XLR 38CM

## (undated) DEVICE — SUT MNCRYL PLS ANTIB UD 3/0 PS2 27IN

## (undated) DEVICE — ENDOPATH XCEL UNIVERSAL TROCAR STABLILITY SLEEVES: Brand: ENDOPATH XCEL

## (undated) DEVICE — BLANKT WARM UPPR/BDY ARM/OUT 57X196CM

## (undated) DEVICE — GLV SURG BIOGEL PI ULTRATOUCH G SZ7.5 LF

## (undated) DEVICE — ADHS LIQ MASTISOL 2/3ML

## (undated) DEVICE — CABL BIPOL MEGADYNE 12FT DISP

## (undated) DEVICE — Device

## (undated) DEVICE — ADHS SKIN PREMIERPRO EXOFIN TOPICAL HI/VISC .5ML

## (undated) DEVICE — 40583 XL ADVANCED TRENDELENBURG POSITIONING KIT: Brand: 40583 XL ADVANCED TRENDELENBURG POSITIONING KIT

## (undated) DEVICE — SOL NACL 0.9PCT 1000ML

## (undated) DEVICE — GLV SURG PREMIERPRO MIC LTX PF SZ7.5 BRN

## (undated) DEVICE — STRAP POSTN KN/BDY FM 5X72IN DISP

## (undated) DEVICE — SUT ETHLN 3/0 FS1 30IN 669H

## (undated) DEVICE — HDRST INTUB GENTLETOUCH SLOT 7IN RT

## (undated) DEVICE — RICH LAVH: Brand: MEDLINE INDUSTRIES, INC.

## (undated) DEVICE — PENCL ROCKRSWCH MEGADYNE W/HOLSTR 10FT SS

## (undated) DEVICE — ST TBG PNEUMOCLEAR EVAC SMOKE HIFLO

## (undated) DEVICE — PATIENT RETURN ELECTRODE, SINGLE-USE, CONTACT QUALITY MONITORING, ADULT, WITH 9FT CORD, FOR PATIENTS WEIGING OVER 33LBS. (15KG): Brand: MEGADYNE

## (undated) DEVICE — CONMED SCOPE SAVER BITE BLOCK, 20X27 MM: Brand: SCOPE SAVER

## (undated) DEVICE — VLV SXN AIR/H2O ORCAPOD3 1P/U STRL